# Patient Record
Sex: MALE | Race: WHITE | NOT HISPANIC OR LATINO | Employment: OTHER | URBAN - NONMETROPOLITAN AREA
[De-identification: names, ages, dates, MRNs, and addresses within clinical notes are randomized per-mention and may not be internally consistent; named-entity substitution may affect disease eponyms.]

---

## 2018-05-10 PROCEDURE — 96365 THER/PROPH/DIAG IV INF INIT: CPT

## 2018-05-10 PROCEDURE — 99283 EMERGENCY DEPT VISIT LOW MDM: CPT

## 2018-05-10 PROCEDURE — 96368 THER/DIAG CONCURRENT INF: CPT

## 2018-05-11 ENCOUNTER — HOSPITAL ENCOUNTER (EMERGENCY)
Facility: HOSPITAL | Age: 58
Discharge: ANOTHER HOSPITAL | End: 2018-05-11
Attending: EMERGENCY MEDICINE | Admitting: EMERGENCY MEDICINE

## 2018-05-11 ENCOUNTER — APPOINTMENT (OUTPATIENT)
Dept: ULTRASOUND IMAGING | Facility: HOSPITAL | Age: 58
End: 2018-05-11

## 2018-05-11 ENCOUNTER — HOSPITAL ENCOUNTER (INPATIENT)
Facility: HOSPITAL | Age: 58
LOS: 7 days | Discharge: NURSING, REHAB OR LONG-TERM CARE | End: 2018-05-18
Attending: INTERNAL MEDICINE | Admitting: THORACIC SURGERY (CARDIOTHORACIC VASCULAR SURGERY)

## 2018-05-11 ENCOUNTER — APPOINTMENT (OUTPATIENT)
Dept: GENERAL RADIOLOGY | Facility: HOSPITAL | Age: 58
End: 2018-05-11

## 2018-05-11 VITALS
RESPIRATION RATE: 18 BRPM | HEART RATE: 82 BPM | OXYGEN SATURATION: 99 % | BODY MASS INDEX: 31.41 KG/M2 | HEIGHT: 64 IN | WEIGHT: 184 LBS | DIASTOLIC BLOOD PRESSURE: 106 MMHG | SYSTOLIC BLOOD PRESSURE: 163 MMHG | TEMPERATURE: 97.8 F

## 2018-05-11 DIAGNOSIS — Z74.09 IMPAIRED FUNCTIONAL MOBILITY, BALANCE, GAIT, AND ENDURANCE: ICD-10-CM

## 2018-05-11 DIAGNOSIS — E08.621 DIABETIC ULCER OF TOE OF LEFT FOOT ASSOCIATED WITH DIABETES MELLITUS DUE TO UNDERLYING CONDITION, WITH BONE INVOLVEMENT WITHOUT EVIDENCE OF NECROSIS (HCC): ICD-10-CM

## 2018-05-11 DIAGNOSIS — L97.526 DIABETIC ULCER OF TOE OF LEFT FOOT ASSOCIATED WITH DIABETES MELLITUS DUE TO UNDERLYING CONDITION, WITH BONE INVOLVEMENT WITHOUT EVIDENCE OF NECROSIS (HCC): ICD-10-CM

## 2018-05-11 DIAGNOSIS — L02.612 CELLULITIS AND ABSCESS OF TOE OF LEFT FOOT: Primary | ICD-10-CM

## 2018-05-11 DIAGNOSIS — I73.9 PAD (PERIPHERAL ARTERY DISEASE) (HCC): ICD-10-CM

## 2018-05-11 DIAGNOSIS — L03.032 CELLULITIS AND ABSCESS OF TOE OF LEFT FOOT: Primary | ICD-10-CM

## 2018-05-11 DIAGNOSIS — I96 GANGRENE OF LEFT FOOT (HCC): Primary | ICD-10-CM

## 2018-05-11 PROBLEM — E11.9 DIABETES MELLITUS, TYPE 2 (HCC): Status: ACTIVE | Noted: 2018-05-11

## 2018-05-11 PROBLEM — N28.9 RENAL INSUFFICIENCY: Status: ACTIVE | Noted: 2018-05-11

## 2018-05-11 PROBLEM — L03.116 CELLULITIS OF LEFT LOWER EXTREMITY: Status: ACTIVE | Noted: 2018-05-11

## 2018-05-11 PROBLEM — L03.90 CELLULITIS: Status: ACTIVE | Noted: 2018-05-11

## 2018-05-11 PROBLEM — Z72.0 TOBACCO ABUSE: Status: ACTIVE | Noted: 2018-05-11

## 2018-05-11 LAB
ALBUMIN SERPL-MCNC: 4.6 G/DL (ref 3.5–5)
ALBUMIN/GLOB SERPL: 1.3 G/DL (ref 1.5–2.5)
ALP SERPL-CCNC: 144 U/L (ref 40–129)
ALT SERPL W P-5'-P-CCNC: 83 U/L (ref 10–44)
ANION GAP SERPL CALCULATED.3IONS-SCNC: 9.7 MMOL/L (ref 3.6–11.2)
AST SERPL-CCNC: 68 U/L (ref 10–34)
BASOPHILS # BLD AUTO: 0.03 10*3/MM3 (ref 0–0.3)
BASOPHILS NFR BLD AUTO: 0.3 % (ref 0–2)
BILIRUB SERPL-MCNC: 0.3 MG/DL (ref 0.2–1.8)
BNP SERPL-MCNC: 5 PG/ML (ref 0–100)
BUN BLD-MCNC: 34 MG/DL (ref 7–21)
BUN/CREAT SERPL: 27.6 (ref 7–25)
CALCIUM SPEC-SCNC: 10.8 MG/DL (ref 7.7–10)
CHLORIDE SERPL-SCNC: 108 MMOL/L (ref 99–112)
CO2 SERPL-SCNC: 20.3 MMOL/L (ref 24.3–31.9)
CREAT BLD-MCNC: 1.23 MG/DL (ref 0.43–1.29)
CRP SERPL-MCNC: 17.26 MG/DL (ref 0–0.99)
D-LACTATE SERPL-SCNC: 1.1 MMOL/L (ref 0.5–2)
D-LACTATE SERPL-SCNC: 2.1 MMOL/L (ref 0.5–2)
DEPRECATED RDW RBC AUTO: 52.5 FL (ref 37–54)
EOSINOPHIL # BLD AUTO: 0.32 10*3/MM3 (ref 0–0.7)
EOSINOPHIL NFR BLD AUTO: 3.2 % (ref 0–5)
ERYTHROCYTE [DISTWIDTH] IN BLOOD BY AUTOMATED COUNT: 16.3 % (ref 11.5–14.5)
ERYTHROCYTE [SEDIMENTATION RATE] IN BLOOD: 36 MM/HR (ref 0–20)
GFR SERPL CREATININE-BSD FRML MDRD: 60 ML/MIN/1.73
GLOBULIN UR ELPH-MCNC: 3.6 GM/DL
GLUCOSE BLD-MCNC: 199 MG/DL (ref 70–110)
GLUCOSE BLDC GLUCOMTR-MCNC: 112 MG/DL (ref 70–130)
GLUCOSE BLDC GLUCOMTR-MCNC: 135 MG/DL (ref 70–130)
GLUCOSE BLDC GLUCOMTR-MCNC: 137 MG/DL (ref 70–130)
HCT VFR BLD AUTO: 34.6 % (ref 42–52)
HGB BLD-MCNC: 11.1 G/DL (ref 14–18)
HOLD SPECIMEN: NORMAL
IMM GRANULOCYTES # BLD: 0.08 10*3/MM3 (ref 0–0.03)
IMM GRANULOCYTES NFR BLD: 0.8 % (ref 0–0.5)
LYMPHOCYTES # BLD AUTO: 1.92 10*3/MM3 (ref 1–3)
LYMPHOCYTES NFR BLD AUTO: 19.5 % (ref 21–51)
MCH RBC QN AUTO: 28.6 PG (ref 27–33)
MCHC RBC AUTO-ENTMCNC: 32.1 G/DL (ref 33–37)
MCV RBC AUTO: 89.2 FL (ref 80–94)
MONOCYTES # BLD AUTO: 0.59 10*3/MM3 (ref 0.1–0.9)
MONOCYTES NFR BLD AUTO: 6 % (ref 0–10)
NEUTROPHILS # BLD AUTO: 6.91 10*3/MM3 (ref 1.4–6.5)
NEUTROPHILS NFR BLD AUTO: 70.2 % (ref 30–70)
OSMOLALITY SERPL CALC.SUM OF ELEC: 288.9 MOSM/KG (ref 273–305)
PLATELET # BLD AUTO: 275 10*3/MM3 (ref 130–400)
PMV BLD AUTO: 11.6 FL (ref 6–10)
POTASSIUM BLD-SCNC: 3.6 MMOL/L (ref 3.5–5.3)
PROT SERPL-MCNC: 8.2 G/DL (ref 6–8)
RBC # BLD AUTO: 3.88 10*6/MM3 (ref 4.7–6.1)
SODIUM BLD-SCNC: 138 MMOL/L (ref 135–153)
WBC NRBC COR # BLD: 9.85 10*3/MM3 (ref 4.5–12.5)
WHOLE BLOOD HOLD SPECIMEN: NORMAL
WHOLE BLOOD HOLD SPECIMEN: NORMAL

## 2018-05-11 PROCEDURE — 87205 SMEAR GRAM STAIN: CPT | Performed by: INTERNAL MEDICINE

## 2018-05-11 PROCEDURE — 93926 LOWER EXTREMITY STUDY: CPT | Performed by: RADIOLOGY

## 2018-05-11 PROCEDURE — 93926 LOWER EXTREMITY STUDY: CPT

## 2018-05-11 PROCEDURE — 96368 THER/DIAG CONCURRENT INF: CPT

## 2018-05-11 PROCEDURE — 73660 X-RAY EXAM OF TOE(S): CPT

## 2018-05-11 PROCEDURE — 99222 1ST HOSP IP/OBS MODERATE 55: CPT | Performed by: THORACIC SURGERY (CARDIOTHORACIC VASCULAR SURGERY)

## 2018-05-11 PROCEDURE — 25010000002 PIPERACILLIN SOD-TAZOBACTAM PER 1 G: Performed by: INTERNAL MEDICINE

## 2018-05-11 PROCEDURE — 96365 THER/PROPH/DIAG IV INF INIT: CPT

## 2018-05-11 PROCEDURE — 25010000002 HEPARIN (PORCINE) PER 1000 UNITS: Performed by: INTERNAL MEDICINE

## 2018-05-11 PROCEDURE — 82962 GLUCOSE BLOOD TEST: CPT

## 2018-05-11 PROCEDURE — 80053 COMPREHEN METABOLIC PANEL: CPT | Performed by: PHYSICIAN ASSISTANT

## 2018-05-11 PROCEDURE — 83605 ASSAY OF LACTIC ACID: CPT | Performed by: EMERGENCY MEDICINE

## 2018-05-11 PROCEDURE — 25010000002 VANCOMYCIN PER 500 MG: Performed by: PHYSICIAN ASSISTANT

## 2018-05-11 PROCEDURE — 99223 1ST HOSP IP/OBS HIGH 75: CPT | Performed by: INTERNAL MEDICINE

## 2018-05-11 PROCEDURE — 93971 EXTREMITY STUDY: CPT

## 2018-05-11 PROCEDURE — 85652 RBC SED RATE AUTOMATED: CPT | Performed by: EMERGENCY MEDICINE

## 2018-05-11 PROCEDURE — 87040 BLOOD CULTURE FOR BACTERIA: CPT | Performed by: EMERGENCY MEDICINE

## 2018-05-11 PROCEDURE — 63710000001 INSULIN LISPRO (HUMAN) PER 5 UNITS: Performed by: INTERNAL MEDICINE

## 2018-05-11 PROCEDURE — 93971 EXTREMITY STUDY: CPT | Performed by: RADIOLOGY

## 2018-05-11 PROCEDURE — 86140 C-REACTIVE PROTEIN: CPT | Performed by: EMERGENCY MEDICINE

## 2018-05-11 PROCEDURE — 25010000002 PIPERACILLIN-TAZOBACTAM: Performed by: PHYSICIAN ASSISTANT

## 2018-05-11 PROCEDURE — 83880 ASSAY OF NATRIURETIC PEPTIDE: CPT | Performed by: EMERGENCY MEDICINE

## 2018-05-11 PROCEDURE — 85025 COMPLETE CBC W/AUTO DIFF WBC: CPT | Performed by: PHYSICIAN ASSISTANT

## 2018-05-11 PROCEDURE — 87070 CULTURE OTHR SPECIMN AEROBIC: CPT | Performed by: INTERNAL MEDICINE

## 2018-05-11 RX ORDER — LISINOPRIL 40 MG/1
40 TABLET ORAL DAILY
COMMUNITY

## 2018-05-11 RX ORDER — AMLODIPINE BESYLATE 10 MG/1
10 TABLET ORAL DAILY
COMMUNITY

## 2018-05-11 RX ORDER — CLONIDINE HYDROCHLORIDE 0.1 MG/1
0.1 TABLET ORAL 2 TIMES DAILY
COMMUNITY

## 2018-05-11 RX ORDER — LISINOPRIL 20 MG/1
40 TABLET ORAL DAILY
Status: DISCONTINUED | OUTPATIENT
Start: 2018-05-11 | End: 2018-05-18 | Stop reason: HOSPADM

## 2018-05-11 RX ORDER — OXYCODONE HYDROCHLORIDE AND ACETAMINOPHEN 5; 325 MG/1; MG/1
1 TABLET ORAL EVERY 4 HOURS PRN
Status: DISCONTINUED | OUTPATIENT
Start: 2018-05-11 | End: 2018-05-18 | Stop reason: HOSPADM

## 2018-05-11 RX ORDER — FENOFIBRATE 145 MG/1
145 TABLET, COATED ORAL DAILY
COMMUNITY

## 2018-05-11 RX ORDER — AMLODIPINE BESYLATE 10 MG/1
10 TABLET ORAL DAILY
Status: DISCONTINUED | OUTPATIENT
Start: 2018-05-11 | End: 2018-05-18 | Stop reason: HOSPADM

## 2018-05-11 RX ORDER — SODIUM CHLORIDE 0.9 % (FLUSH) 0.9 %
10 SYRINGE (ML) INJECTION AS NEEDED
Status: DISCONTINUED | OUTPATIENT
Start: 2018-05-11 | End: 2018-05-11 | Stop reason: HOSPADM

## 2018-05-11 RX ORDER — ATORVASTATIN CALCIUM 40 MG/1
80 TABLET, FILM COATED ORAL NIGHTLY
Status: DISCONTINUED | OUTPATIENT
Start: 2018-05-11 | End: 2018-05-18 | Stop reason: HOSPADM

## 2018-05-11 RX ORDER — ROPINIROLE 0.5 MG/1
1 TABLET, FILM COATED ORAL NIGHTLY
Status: DISCONTINUED | OUTPATIENT
Start: 2018-05-11 | End: 2018-05-18 | Stop reason: HOSPADM

## 2018-05-11 RX ORDER — DOXYCYCLINE 100 MG/1
100 CAPSULE ORAL EVERY 12 HOURS SCHEDULED
Status: COMPLETED | OUTPATIENT
Start: 2018-05-11 | End: 2018-05-18

## 2018-05-11 RX ORDER — CARVEDILOL 12.5 MG/1
25 TABLET ORAL 2 TIMES DAILY WITH MEALS
Status: DISCONTINUED | OUTPATIENT
Start: 2018-05-11 | End: 2018-05-18 | Stop reason: HOSPADM

## 2018-05-11 RX ORDER — DOXYCYCLINE HYCLATE 100 MG
100 TABLET ORAL EVERY 12 HOURS SCHEDULED
Status: DISCONTINUED | OUTPATIENT
Start: 2018-05-11 | End: 2018-05-11 | Stop reason: ALTCHOICE

## 2018-05-11 RX ORDER — ASPIRIN 81 MG/1
324 TABLET, CHEWABLE ORAL DAILY
Status: DISCONTINUED | OUTPATIENT
Start: 2018-05-11 | End: 2018-05-18 | Stop reason: HOSPADM

## 2018-05-11 RX ORDER — CLONIDINE HYDROCHLORIDE 0.1 MG/1
0.1 TABLET ORAL EVERY 12 HOURS SCHEDULED
Status: DISCONTINUED | OUTPATIENT
Start: 2018-05-11 | End: 2018-05-18 | Stop reason: HOSPADM

## 2018-05-11 RX ORDER — IBUPROFEN 800 MG/1
800 TABLET ORAL EVERY 6 HOURS PRN
COMMUNITY
End: 2018-05-18 | Stop reason: HOSPADM

## 2018-05-11 RX ORDER — NICOTINE 21 MG/24HR
1 PATCH, TRANSDERMAL 24 HOURS TRANSDERMAL DAILY
Status: DISCONTINUED | OUTPATIENT
Start: 2018-05-11 | End: 2018-05-18 | Stop reason: HOSPADM

## 2018-05-11 RX ORDER — ATORVASTATIN CALCIUM 20 MG/1
20 TABLET, FILM COATED ORAL DAILY
COMMUNITY
End: 2018-05-18 | Stop reason: HOSPADM

## 2018-05-11 RX ORDER — DEXTROSE MONOHYDRATE 25 G/50ML
25 INJECTION, SOLUTION INTRAVENOUS
Status: DISCONTINUED | OUTPATIENT
Start: 2018-05-11 | End: 2018-05-18 | Stop reason: HOSPADM

## 2018-05-11 RX ORDER — NICOTINE POLACRILEX 4 MG
15 LOZENGE BUCCAL
Status: DISCONTINUED | OUTPATIENT
Start: 2018-05-11 | End: 2018-05-18 | Stop reason: HOSPADM

## 2018-05-11 RX ORDER — HEPARIN SODIUM 5000 [USP'U]/ML
5000 INJECTION, SOLUTION INTRAVENOUS; SUBCUTANEOUS EVERY 8 HOURS SCHEDULED
Status: DISCONTINUED | OUTPATIENT
Start: 2018-05-11 | End: 2018-05-14

## 2018-05-11 RX ORDER — NITROGLYCERIN 0.4 MG/1
0.4 TABLET SUBLINGUAL
COMMUNITY

## 2018-05-11 RX ORDER — ALLOPURINOL 300 MG/1
300 TABLET ORAL DAILY
Status: DISCONTINUED | OUTPATIENT
Start: 2018-05-11 | End: 2018-05-18 | Stop reason: HOSPADM

## 2018-05-11 RX ORDER — SODIUM CHLORIDE 0.9 % (FLUSH) 0.9 %
1-10 SYRINGE (ML) INJECTION AS NEEDED
Status: DISCONTINUED | OUTPATIENT
Start: 2018-05-11 | End: 2018-05-18 | Stop reason: HOSPADM

## 2018-05-11 RX ORDER — ROPINIROLE 1 MG/1
1 TABLET, FILM COATED ORAL NIGHTLY
COMMUNITY

## 2018-05-11 RX ORDER — CARVEDILOL 25 MG/1
25 TABLET ORAL 2 TIMES DAILY WITH MEALS
COMMUNITY

## 2018-05-11 RX ORDER — ALLOPURINOL 300 MG/1
300 TABLET ORAL DAILY
COMMUNITY

## 2018-05-11 RX ADMIN — HEPARIN SODIUM 5000 UNITS: 5000 INJECTION, SOLUTION INTRAVENOUS; SUBCUTANEOUS at 14:33

## 2018-05-11 RX ADMIN — LISINOPRIL 40 MG: 20 TABLET ORAL at 11:11

## 2018-05-11 RX ADMIN — ATORVASTATIN CALCIUM 80 MG: 40 TABLET, FILM COATED ORAL at 21:41

## 2018-05-11 RX ADMIN — OXYCODONE HYDROCHLORIDE AND ACETAMINOPHEN 1 TABLET: 5; 325 TABLET ORAL at 14:12

## 2018-05-11 RX ADMIN — TAZOBACTAM SODIUM AND PIPERACILLIN SODIUM 4.5 G: .5; 4 INJECTION, POWDER, LYOPHILIZED, FOR SOLUTION INTRAVENOUS at 02:47

## 2018-05-11 RX ADMIN — TAZOBACTAM SODIUM AND PIPERACILLIN SODIUM 4.5 G: 500; 4 INJECTION, SOLUTION INTRAVENOUS at 17:49

## 2018-05-11 RX ADMIN — DOXYCYCLINE 100 MG: 100 CAPSULE ORAL at 17:49

## 2018-05-11 RX ADMIN — HEPARIN SODIUM 5000 UNITS: 5000 INJECTION, SOLUTION INTRAVENOUS; SUBCUTANEOUS at 21:41

## 2018-05-11 RX ADMIN — OXYCODONE HYDROCHLORIDE AND ACETAMINOPHEN 1 TABLET: 5; 325 TABLET ORAL at 21:45

## 2018-05-11 RX ADMIN — CARVEDILOL 25 MG: 12.5 TABLET, FILM COATED ORAL at 11:12

## 2018-05-11 RX ADMIN — ROPINIROLE 1 MG: 0.5 TABLET, FILM COATED ORAL at 21:41

## 2018-05-11 RX ADMIN — SODIUM CHLORIDE 1000 ML: 9 INJECTION, SOLUTION INTRAVENOUS at 02:47

## 2018-05-11 RX ADMIN — AMLODIPINE BESYLATE 10 MG: 10 TABLET ORAL at 11:12

## 2018-05-11 RX ADMIN — TAZOBACTAM SODIUM AND PIPERACILLIN SODIUM 4.5 G: 500; 4 INJECTION, SOLUTION INTRAVENOUS at 12:24

## 2018-05-11 RX ADMIN — CLONIDINE HYDROCHLORIDE 0.1 MG: 0.1 TABLET ORAL at 21:41

## 2018-05-11 RX ADMIN — ALLOPURINOL 300 MG: 300 TABLET ORAL at 11:12

## 2018-05-11 RX ADMIN — VANCOMYCIN HYDROCHLORIDE 1750 MG: 5 INJECTION, POWDER, LYOPHILIZED, FOR SOLUTION INTRAVENOUS at 03:31

## 2018-05-11 RX ADMIN — CLONIDINE HYDROCHLORIDE 0.1 MG: 0.1 TABLET ORAL at 11:11

## 2018-05-11 RX ADMIN — ASPIRIN 81 MG 324 MG: 81 TABLET ORAL at 11:11

## 2018-05-11 RX ADMIN — CARVEDILOL 25 MG: 12.5 TABLET, FILM COATED ORAL at 17:49

## 2018-05-11 NOTE — CONSULTS
Sandoval Hernandez  1960  [id number]  5/11/2018      Referring Provider: Marcel Fagan M.D. [facility].  Reason for Consultation: Evolving wet gangrene of left foot.      Subjective   History of present illness:  This is an unfortunate 58-year-old gentleman from [facility].  He is a poor historian but believes that he was diagnosed with diabetes mellitus more than a decade ago.  He has been on metformin over the last number of years without any supplemental insulin.  He is known to have peripheral arterial disease with previous amputations of the left hallux and fourth digit.  2 weeks ago, he developed rest pain and apparently underwent angiography and stent deployment to the left lower extremity at [facility].  This was followed by discoloration of the second, third, and fifth digits of the left foot with a large necrotic eschar extending over the fifth ray, proximally and laterally.  The patient has experienced subjective fever and chilling.  He has had bulla formation of the dorsal foot which has spontaneously ruptured with release of foul-smelling serous material.  He was subsequently transferred to [place] for a higher level of care.  Vancomycin and piperacillin tazobactam have been initiated.  A C-reactive protein is markedly elevated at 17.3.  Serum lactate level is 1.1.  Peripheral leukocyte count is 9800.  Of note, arterial duplex studies of the left lower extremity reveal largely patent vessels.  A duplex venogram is negative for deep vein thrombosis.  Blood cultures ×2 are negative at less than 24 hours.  The patient has no recollection of his last tetanus immunization.  Asked to assist with antimicrobial therapy and diagnostic evaluation in this context.    Past Medical History:   Diagnosis Date   • Diabetes mellitus    • Gout    • Hypertension        Past Surgical History:   Procedure Laterality Date   • AMPUTATION FOOT / TOE      amputation of toes to left foot        Pediatric History   Patient Guardian Status   • Not on file.     Other Topics Concern   • Not on file     Social History Narrative   • No narrative on file       family history is not on file.    No Known Allergies    Medication:MAR reviewed.  Antibiotics: See below.  Anti-Infectives     Ordered     Dose/Rate Route Frequency Start Stop    05/11/18 1048  vancomycin 1250 mg/250 mL 0.9% NS IVPB ([facility])     Ordering Provider:  Arlene Laboy RPH    1,250 mg  over 1.25 Hours Intravenous Every 24 Hours 05/11/18 2300 05/18/18 2259    05/11/18 1026  piperacillin-tazobactam (ZOSYN) 4.5 g in iso-osmotic dextrose 100 mL IVPB (premix)     Ordering Provider:  Marcel Fagan MD    4.5 g  over 4 Hours Intravenous Every 8 Hours 05/11/18 1700 05/18/18 1659    05/11/18 1026  piperacillin-tazobactam (ZOSYN) 4.5 g in iso-osmotic dextrose 100 mL IVPB (premix)     Ordering Provider:  Marcel Fagan MD    4.5 g  over 30 Minutes Intravenous Once 05/11/18 1100 05/11/18 1254    05/11/18 1026  Pharmacy to dose vancomycin     Ordering Provider:  Marcel Fagan MD     Does not apply Continuous PRN 05/11/18 1025 05/18/18 1024          Please refer to the medical record for a full medication list    Review of Systems  Pertinent items are noted in HPI, all other systems reviewed and negative    Objective     Physical Exam: See below.  Vital Signs   Temp:  [97.8 °F (36.6 °C)-98.9 °F (37.2 °C)] 98.4 °F (36.9 °C)  Heart Rate:  [82-92] 82  Resp:  [18] 18  BP: (130-164)/() 164/83    Blood pressure 164/83, pulse 82, temperature 98.4 °F (36.9 °C), temperature source Oral, resp. rate 18, SpO2 98 %.  GENERAL: Awake and alert, in no acute distress.  Disheveled gentleman.  No acute distress.  Poor historian.  HEENT: Oropharynx without thrush. Sinuses nontender.  Severe periodontal disease with 6 remaining carious teeth.. No cervical adenopathy. No carotid bruits/ jugular venous distention.   EYES: PERRL. No conjunctival injection. No icterus.  EOMI.  LYMPHATICS: No lymphadenopathy of the neck or axillary or inguinal regions.   HEART: No murmur, gallop, or pericardial friction rub.   LUNGS: Clear to auscultation anteriorly. No percussion dullness.   ABDOMEN: Soft, nontender, nondistended. No appreciable HSM.    SKIN: Warm and dry without cutaneous eruptions. No embolic stigmata.  Wet gangrene in evolution noted on the left foot.  Violaceous changes with bulla formation overlying the second third and fifth digits.  There is a large necrotic eschar over the dorsal/lateral aspect of the left foot.  I am unable to palpate crepitant gas in the soft tissues.  The dorsalis pedis and posterior tibialis pulses are nonpalpable.  PSYCHIATRIC: Mental status lucid. Cranial nerve function intact.       Results Review:   I reviewed the patient's new clinical results.  I reviewed the patient's new imaging results and agree with the interpretation.    Lab Results   Component Value Date    WBC 9.85 05/11/2018    HGB 11.1 (L) 05/11/2018    HCT 34.6 (L) 05/11/2018    MCV 89.2 05/11/2018     05/11/2018       Lab Results   Component Value Date    GLUCOSE 199 (H) 05/11/2018    BUN 34 (H) 05/11/2018    CREATININE 1.23 05/11/2018    EGFRIFNONA 60 (L) 05/11/2018    BCR 27.6 (H) 05/11/2018    CO2 20.3 (L) 05/11/2018    CALCIUM 10.8 (H) 05/11/2018    ALBUMIN 4.60 05/11/2018    LABIL2 1.3 (L) 05/11/2018    AST 68 (H) 05/11/2018    ALT 83 (H) 05/11/2018       Estimated Creatinine Clearance: 63.8 mL/min (by C-G formula based on SCr of 1.23 mg/dL).      Microbiology:Blood cultures ×2 incubating.  Wound culture pending.      Radiology:  Imaging Results (last 72 hours)     ** No results found for the last 72 hours. **          ASSESSMENT AND PLAN: Evolving wet gangrene of the left lower extremity.  Type 2 diabetes mellitus.  Severe peripheral arterial disease.  Recent stent deployment in left lower extremity for rest pain at [facility]/presumed atheroembolic event.   Markedly elevated C-reactive protein/probable underlying osteomyelitis.  Chronic kidney disease stage II.  Hypercalcemia/serum calcium 10.8/albumin 4.6.  Elevated liver function studies/ likely related to sepsis or statin therapy.  Ongoing tobacco abuse.  Inadequate tetanus immunization.  Maximum temperature over 24 hours 98.6°.  Currently 98.4.  Hemodynamically stable.  Arterial duplex and venous studies reviewed.  No plain films to look for crepitant gas in the soft tissues or gross digital osteolytic changes.  Agree with piperacillin-tazobactam for broad staph aureus, enteric gram-negative rods including Pseudomonas, and anaerobic activity.  We'll discontinue vancomycin given diabetes mellitus and renal dysfunction.  Add oral doxycycline for empiric MRSA coverage.  Examined at the bedside with Dr. Carreno.  Anticipate below-knee amputation on Monday unless pushed by hemodynamic instability/systemic sepsis.       I discussed the patients findings and my recommendations with patient and consulting provider    Thank you for this consult.  Our group would be pleased to follow this patient over the course of their hospitalization and assist with outpatient antimicrobial therapy, as indicated.     Dc Coates MD  5/11/2018

## 2018-05-11 NOTE — PLAN OF CARE
Problem: Fall Risk (Adult)  Goal: Identify Related Risk Factors and Signs and Symptoms  Outcome: Ongoing (interventions implemented as appropriate)    Goal: Absence of Fall  Outcome: Ongoing (interventions implemented as appropriate)      Problem: Patient Care Overview  Goal: Plan of Care Review  Outcome: Ongoing (interventions implemented as appropriate)      Problem: Infection, Risk/Actual (Adult)  Goal: Identify Related Risk Factors and Signs and Symptoms  Outcome: Ongoing (interventions implemented as appropriate)    Goal: Infection Prevention/Resolution  Outcome: Ongoing (interventions implemented as appropriate)

## 2018-05-11 NOTE — NURSING NOTE
ACC REVIEW REPORT: [facility]        PATIENT NAME: Sandoval Hernandez    PATIENT ID: [id number]    BED: S337    BED TYPE: TELEMETRY    BED GIVEN TO:BHARGAVI ROWAN RN    TIME BED GIVEN: 0623    YOB: 1960    AGE: 58    GENDER: MALE    PREVIOUS ADMIT TO [facility]: NO    PREVIOUS ADMISSION DATE: N/A    PATIENT CLASS: INPATIENT    TODAY'S DATE: 5/11/2018    TRANSFER DATE: 5/11/18    ETA: 0800    TRANSFERRING FACILITY: [facility]    TRANSFERRING FACILITY PHONE # : [phone]    TRANSFERRING MD: PEYTON    DATE/TIME REQUEST RECEIVED: 5/11/18 0515    [facility] RN: JACKIE VUONG RN    REPORT FROM: BHARGAVI ROWAN RN    TIME REPORT TAKEN: 0615    DIAGNOSIS: LEFT FOOT CELLULITIS    REASON FOR TRANSFER TO [facility]: HIGHER LEVEL OF CARE    TRANSPORTATION: AMBULANCE    CLINICAL REASON FOR TRANSFER TO [facility]: PT CAME TO ER WITH C/O LEFT FOOT SWELLING PAIN AND FEVER.  REMAINING TOES ARE NECROTIC      CLINICAL INFORMATION    HEIGHT: 64 INCHES    WEIGHT: 184 LBS    ALLERGIES: NKDA    ELIAS: NO    INFECTIOUS DISEASE: NO    ISOLATION: NO    LAST VITAL SIGNS:  TIME:0620  TEMP: AFEB  PULSE: 92  B/P: 130/64  RESP: 20    LAB INFORMATION:  WBC 4.50 - 12.50 10*3/mm3 9.85    RBC 4.70 - 6.10 10*6/mm3 3.88     Hemoglobin 14.0 - 18.0 g/dL 11.1     Hematocrit 42.0 - 52.0 % 34.6     MCV 80.0 - 94.0 fL 89.2    MCH 27.0 - 33.0 pg 28.6    MCHC 33.0 - 37.0 g/dL 32.1     RDW 11.5 - 14.5 % 16.3     RDW-SD 37.0 - 54.0 fl 52.5    MPV 6.0 - 10.0 fL 11.6     Platelets 130 - 400 10*3/mm3 275    Neutrophil % 30.0 - 70.0 % 70.2     Lymphocyte % 21.0 - 51.0 % 19.5     Monocyte % 0.0 - 10.0 % 6.0    Eosinophil % 0.0 - 5.0 % 3.2    Basophil % 0.0 - 2.0 % 0.3    Immature Grans % 0.0 - 0.5 % 0.8     Neutrophils, Absolute 1.40 - 6.50 10*3/mm3 6.91     Lymphocytes, Absolute 1.00 - 3.00 10*3/mm3 1.92    Monocytes, Absolute 0.10 - 0.90 10*3/mm3 0.59    Eosinophils, Absolute 0.00 - 0.70 10*3/mm3 0.32    Basophils, Absolute 0.00 - 0.30 10*3/mm3 0.03    Immature  Grans, Absolute 0.00 - 0.03 10*3/mm3 0.08       C-Reactive Protein 0.00 - 0.99 mg/dL 17.26       Sed Rate 0 - 20 mm/hr 36       Glucose 70 - 110 mg/dL 199     BUN 7 - 21 mg/dL 34     Creatinine 0.43 - 1.29 mg/dL 1.23    Sodium 135 - 153 mmol/L 138    Potassium 3.5 - 5.3 mmol/L 3.6    Chloride 99 - 112 mmol/L 108    CO2 24.3 - 31.9 mmol/L 20.3     Calcium 7.7 - 10.0 mg/dL 10.8     Total Protein 6.0 - 8.0 g/dL 8.2     Albumin 3.50 - 5.00 g/dL 4.60    ALT (SGPT) 10 - 44 U/L 83     AST (SGOT) 10 - 34 U/L 68     Alkaline Phosphatase 40 - 129 U/L 144     Comments: Note New Reference Ranges   Total Bilirubin 0.2 - 1.8 mg/dL 0.3    eGFR Non African Amer >60 mL/min/1.73 60     Globulin gm/dL 3.6    A/G Ratio 1.5 - 2.5 g/dL 1.3     BUN/Creatinine Ratio 7.0 - 25.0 27.6     Anion Gap 3.6 - 11.2 mmol/L 9.7        CULTURE INFORMATION: PENDING    MEDS/IV FLUIDS: 20 LAC 4.5 GM ZOSYN, 1000 ML NS BOLUS, 1750 MG VANCOMYCIN      CARDIAC SYSTEM:    CHEST PAIN: NO    RATE: 0    SCALE: 1/10    RHYTHM: NSR    Is patient taking or has patient been given any drugs that could increase bleeding? NO  (Plavix, Brilinta, Effient, Eliquis, Xarelto, Warfarin, Integrilin, Angiomax)    DRUG: N/A     DOSE/FREQUENCY: N/A    RESPIRATORY SYSTEM:    LUNG SOUNDS:    CLEAR: Y  CRACKLES: N  WHEEZES: N  RHONCHI: N  DIMINISHED: N    OXYGEN: NO    O2 SAT: 97     ADMINISTRATION ROUTE: R/A    CNS/MUSCULOSKELETAL    ALERT AND ORIENTED:    PERSON: Y  PLACE: Y  TIME: Y    INJURY:  WHERE: Left foot edema and erythema worsened at 1st toe stub as it has been amputated; 4th toe also amputated; 2nd, 3rd and 5th toes discolored.      DAVID COMA SCALE:    E:4  M: 6  V: 5    GI//GY      ABDOMINAL PAIN: N    VOMITING: N    DIARRHEA: N    NAUSEA: N    BOWEL SOUNDS: ACTIVE    OCCULT STOOL: NO    VAGINAL BLEEDING: NO    TESTICULAR PAIN: N/A    HEMATURIA: NO    PAST MEDICAL HISTORY:    Diabetes mellitus     • Gout     • Hypertension       AMPUTATION FOOT / TOE          amputation of toes to left foot     Alondra Anna RN  5/11/2018  6:05 AM

## 2018-05-11 NOTE — H&P
"    [facility] Services  HISTORY AND PHYSICAL    Patient Name: Sandoval Hernandez  : 1960  MRN: [id number]  Primary Care Physician: ALETA Plummer    Subjective   Subjective     Chief Complaint:  Foot pain    HPI:  Sandoval Hernandez is a 58 y.o. male with a history of coronary artery disease, peripheral arterial disease, type 2 diabetes on oral hypoglycemics, and ongoing tobacco abuse who presented to [facility] with complaints of left foot pain.  He has had increasing pain and duskiness to his left midfoot into his toes.  Previous first and fourth toe amputations.  He has had some serous drainage but no purulence.  Associated with redness and warmth.  Apparently 2 weeks ago at [facility] he had stenting done in his left leg but cannot give me full details.  He said the first stent \"collapsed\" and then a second stent was placed.  After that he said he had 6 stents placed across vessels in his abdomen, it is unclear what he could be referring to.  It is also unclear if his taking any antiplatelets.  In the emergency room he was found to have evidence of necrotic toes and areas on his foot.  He has transferred to [facility] for more definitive management.  The patient is understanding he is likely to need amputation and is willing to proceed if needed.    Review of Systems   Constitutional: Negative for fever.   Respiratory: Positive for shortness of breath.    Cardiovascular: Negative for chest pain.   Gastrointestinal: Negative for abdominal pain.   Musculoskeletal: Positive for arthralgias.   All other systems reviewed and are negative.         Otherwise 10-system ROS reviewed and is negative except as mentioned in the HPI.    Personal History     Past Medical History:   Diagnosis Date   • Diabetes mellitus    • Gout    • Hypertension        Past Surgical History:   Procedure Laterality Date   • AMPUTATION FOOT / TOE      amputation of toes to left foot "       Family History: Positive for diabetes.    Social History:  reports that he has been smoking.  He does not have any smokeless tobacco history on file.  Social History     Social History Narrative   • No narrative on file   Smokes half pack to a pack a day.  Denies any alcohol use.  Currently lives at home.    Medications:  Prescriptions Prior to Admission   Medication Sig Dispense Refill Last Dose   • allopurinol (ZYLOPRIM) 300 MG tablet Take 300 mg by mouth Daily.      • amLODIPine (NORVASC) 10 MG tablet Take 10 mg by mouth Daily.      • atorvastatin (LIPITOR) 20 MG tablet Take 20 mg by mouth Daily.      • carvedilol (COREG) 25 MG tablet Take 25 mg by mouth 2 (Two) Times a Day With Meals.      • CloNIDine (CATAPRES) 0.1 MG tablet Take 0.1 mg by mouth 2 (Two) Times a Day.      • fenofibrate (TRICOR) 145 MG tablet Take 145 mg by mouth Daily.      • ibuprofen (ADVIL,MOTRIN) 800 MG tablet Take 800 mg by mouth Every 6 (Six) Hours As Needed for Mild Pain .      • lisinopril (PRINIVIL,ZESTRIL) 40 MG tablet Take 40 mg by mouth Daily.      • metFORMIN (GLUCOPHAGE) 1000 MG tablet Take 1,000 mg by mouth 2 (Two) Times a Day With Meals.      • nitroglycerin (NITROSTAT) 0.4 MG SL tablet Place 0.4 mg under the tongue Every 5 (Five) Minutes As Needed for Chest Pain. Take no more than 3 doses in 15 minutes.      • rOPINIRole (REQUIP) 1 MG tablet Take 1 mg by mouth Every Night. Take 1 hour before bedtime.          No Known Allergies    Objective   Objective     Vital Signs:   Temp:  [97.8 °F (36.6 °C)-98.9 °F (37.2 °C)] 98.6 °F (37 °C)  Heart Rate:  [82-92] 91  Resp:  [18] 18  BP: (130-163)/() 143/76        Physical Exam   Constitutional: No acute distress, awake, alert, appears older than age  HENT: NCAT, mucous membranes moist, poor dentition  Respiratory: diminished but clear, respiratory effort normal   Cardiovascular: RRR, no murmurs, rubs, or gallops  Gastrointestinal: Positive bowel sounds, soft, nontender,  nondistended  Musculoskeletal: left leg with previous 1st, 4th amputations.  2/3rd toes with purple discoloration.  Redness to mid-foot.  Also area or necrosis to lateral side  Psychiatric: Appropriate affect, cooperative  Neurologic: Oriented x 3, strength symmetric in all extremities, Cranial Nerves grossly intact to confrontation, speech clear  Skin: No rashes    Results Reviewed:  I have personally reviewed current lab, radiology, and data and agree.      Results from last 7 days  Lab Units 05/11/18  0116   WBC 10*3/mm3 9.85   HEMOGLOBIN g/dL 11.1*   HEMATOCRIT % 34.6*   PLATELETS 10*3/mm3 275       Results from last 7 days  Lab Units 05/11/18  0116   SODIUM mmol/L 138   POTASSIUM mmol/L 3.6   CHLORIDE mmol/L 108   CO2 mmol/L 20.3*   BUN mg/dL 34*   CREATININE mg/dL 1.23   GLUCOSE mg/dL 199*   CALCIUM mg/dL 10.8*   ALT (SGPT) U/L 83*   AST (SGOT) U/L 68*     Estimated Creatinine Clearance: 63.8 mL/min (by C-G formula based on SCr of 1.23 mg/dL).  Brief Urine Lab Results     None        BNP   Date Value Ref Range Status   05/11/2018 5.0 0.0 - 100.0 pg/mL Final     No results found for: PHART  Imaging Results (last 24 hours)     ** No results found for the last 24 hours. **             Assessment/Plan   Assessment / Plan     Hospital Problem List     * (Principal)Gangrene of left foot    Cellulitis of left lower extremity    PAD (peripheral artery disease)    Diabetes mellitus, type 2    Tobacco abuse    Renal insufficiency            Assessment & Plan:  58-year-old male with history of peripheral arterial disease with previous left toe amputations presents with increasing pain and evidence of gangrene of his left foot and toes.  Apparently recently had some stenting done to the left lower extremity at [facility], we will try to obtain those records to determine degree of disease.  We'll start empiric antibiotics with vancomycin and Zosyn.  We'll have infectious disease help guide antibiotic therapy.   We'll consult Dr. Carreno to evaluate for possible amputation.  The patient understands and is agreeable to proceed if needed.  Place him on aspirin, increase his statin to 80 mg of Lipitor.  Replacement signed scale insulin and checking a when A1c.  We'll need good glucose control for wound healing.  Counseled on tobacco abuse (nicotine patch.  Creatinine is elevated to 1.2 with a GFR of 60 suspect this is chronic but will recheck in the morning.    DVT prophylaxis: heparin    CODE STATUS:  Full Code    Admission Status:  I believe this patient meets INPATIENT status due to the need for care which can only be reasonably provided in an hospital setting such as aggressive/expedited ancillary services and/or consultation services, the necessity for IV medications, close physician monitoring and/or the possible need for procedures.  In such, I feel patient’s risk for adverse outcomes and need for care warrant INPATIENT evaluation and predict the patient’s care encounter to likely last beyond 2 midnights.      Electronically signed by Marcel Fagan MD, 05/11/18, 10:28 AM.

## 2018-05-11 NOTE — ED NOTES
Called [facility] and spoke to Renu to donny hospitalist at [facility] for Stella.     Debi Genao  05/11/18 0422

## 2018-05-11 NOTE — PROGRESS NOTES
Pharmacy Consult-Vancomycin Dosing  Sandoval Hernandez is a  58 y.o. male receiving vancomycin therapy.     Indication: SSTI  Consulting Provider: Dr. Fagan  ID Consult: Yes    Goal Trough: 10-20mg/L    Current Antimicrobial Therapy  Anti-Infectives     Ordered     Dose/Rate Route Frequency Start Stop    05/11/18 1048  vancomycin 1250 mg/250 mL 0.9% NS IVPB ([facility])     Ordering Provider:  Arlene Laboy RPH    1,250 mg  over 1.25 Hours Intravenous Every 24 Hours 05/11/18 2300 05/18/18 2259    05/11/18 1026  piperacillin-tazobactam (ZOSYN) 4.5 g in iso-osmotic dextrose 100 mL IVPB (premix)     Ordering Provider:  Marcel Fagan MD    4.5 g  over 4 Hours Intravenous Every 8 Hours 05/11/18 1700 05/18/18 1659    05/11/18 1026  piperacillin-tazobactam (ZOSYN) 4.5 g in iso-osmotic dextrose 100 mL IVPB (premix)     Ordering Provider:  Marcel Fagan MD    4.5 g  over 30 Minutes Intravenous Once 05/11/18 1100      05/11/18 1026  Pharmacy to dose vancomycin     Ordering Provider:  Marcel Fagan MD     Does not apply Continuous PRN 05/11/18 1025 05/18/18 1024          Allergies  Allergies as of 05/11/2018   • (No Known Allergies)       Labs      Results from last 7 days  Lab Units 05/11/18  0116   BUN mg/dL 34*   CREATININE mg/dL 1.23         Results from last 7 days  Lab Units 05/11/18  0116   WBC 10*3/mm3 9.85       Evaluation of Dosing     Last Dose Received in the ED/Outside Facility: vancomycin 1750mg IV 5/11 @ 0331    Ht -  162.6 cm  Wt -  83.5 kg    Estimated Creatinine Clearance: 63.8 mL/min (by C-G formula based on SCr of 1.23 mg/dL).    Intake & Output (last 3 days)       05/08 0701 - 05/09 0700 05/09 0701 - 05/10 0700 05/10 0701 - 05/11 0700 05/11 0701 - 05/12 0700    IV Piggyback    100    Total Intake       100    Net       +100                  Microbiology and Radiology  Microbiology Results (last 10 days)     ** No results found for the last 240 hours. **        Wound and blood cultures pending    Evaluation of  Level  Trough 5/12 @ 2230   Prior to third dose    Assessment/Plan:  1. Pharmacy consulted to dose vancomycin for SSTI.  Patient already received vancomycin 1750mg IV x 1 at 0330 today.  Will initiate vancomycin 1250mg IV q24h and plan on checking a trough 5/12 prior to 3rd dose.  2. Continue to check Scr/BUN/UOP while on vancomycin to monitor renal function.  3. Pharmacy will continue to follow.      Arlene Laboy, PharmD  5/11/2018  12:48 PM

## 2018-05-11 NOTE — ED NOTES
Called [facility] dispatch for BLS transport to [facility]. He said he will have [facility] to call me.     Debi Genao  05/11/18 0638

## 2018-05-11 NOTE — ED PROVIDER NOTES
Subjective     Lower Extremity Issue   Location:  Foot  Time since incident:  2 months (Worsening over the past 4 days)  Injury: no    Foot location:  L foot  Pain details:     Quality:  Aching, pressure and sharp    Radiates to:  Does not radiate    Severity:  Moderate    Onset quality:  Gradual    Duration:  2 months    Timing:  Constant    Progression:  Worsening  Chronicity:  Chronic  Dislocation: no    Foreign body present:  No foreign bodies  Tetanus status:  Up to date  Prior injury to area:  No  Relieved by:  Nothing  Worsened by:  Bearing weight and activity  Ineffective treatments: Stents in bilateral legs 10 days ago; 1st toe amputation 4 years ago; 4th toe amputation 1 year ago.  Associated symptoms: decreased ROM, fever and swelling    Associated symptoms: no back pain, no fatigue, no itching, no muscle weakness, no neck pain, no numbness, no stiffness and no tingling    Risk factors: no concern for non-accidental trauma, no frequent fractures, no known bone disorder, no obesity and no recent illness    Risk factors comment:  Type 2 DM (no insulin); smokes 1 ppd       Review of Systems   Constitutional: Positive for fever. Negative for activity change, appetite change, chills, diaphoresis, fatigue and unexpected weight change.   HENT: Negative.    Respiratory: Negative.    Cardiovascular: Negative.  Negative for chest pain.   Gastrointestinal: Negative.  Negative for abdominal pain.   Endocrine: Negative.    Genitourinary: Negative.  Negative for dysuria.   Musculoskeletal: Negative for arthralgias, back pain, gait problem, joint swelling, myalgias, neck pain, neck stiffness and stiffness.        Left foot pain and swelling   Skin: Negative.  Negative for itching.   Neurological: Negative.    Psychiatric/Behavioral: Negative.    All other systems reviewed and are negative.      Past Medical History:   Diagnosis Date   • Diabetes mellitus    • Gout    • Hypertension        No Known Allergies    Past  Surgical History:   Procedure Laterality Date   • AMPUTATION FOOT / TOE      amputation of toes to left foot       History reviewed. No pertinent family history.    Social History     Social History   • Marital status:      Social History Main Topics   • Smoking status: Current Every Day Smoker   • Drug use: Unknown     Other Topics Concern   • Not on file           Objective   Physical Exam   Constitutional: He is oriented to person, place, and time. He appears well-developed and well-nourished. No distress.   HENT:   Head: Normocephalic and atraumatic.   Right Ear: External ear normal.   Left Ear: External ear normal.   Nose: Nose normal.   Eyes: Conjunctivae and EOM are normal. Pupils are equal, round, and reactive to light.   Neck: Normal range of motion. Neck supple. No JVD present. No tracheal deviation present.   Cardiovascular: Normal rate, regular rhythm and normal heart sounds.  Exam reveals no gallop and no friction rub.    No murmur heard.  Pulmonary/Chest: Effort normal and breath sounds normal. No respiratory distress. He has no wheezes. He has no rales. He exhibits no tenderness.   Abdominal: Soft. Bowel sounds are normal. He exhibits no distension and no mass. There is no tenderness. There is no rebound and no guarding. No hernia.   Musculoskeletal: He exhibits edema and tenderness. He exhibits no deformity.   Left foot edema and erythema worsened at 1st toe stub as it has been amputated; 4th toe also amputated; 2nd, 3rd and 5th toes discolored.    Neurological: He is alert and oriented to person, place, and time. No cranial nerve deficit.   Skin: Skin is warm and dry. No rash noted. He is not diaphoretic. No erythema. No pallor.   Psychiatric: He has a normal mood and affect. His behavior is normal. Thought content normal.   Nursing note and vitals reviewed.      Procedures           ED Course  ED Course   Value Comment By Time   US Venous Doppler Lower Extremity Left (duplex) No DVT in LLE  per [facility] report.  SHAMIR Carroll 05/11 0359   US Arterial Doppler Lower Extremity Left Vascular disease but no occlusion of arteries per [facility] report.  SHAMIR Carroll 05/11 0400   US Arterial Doppler Lower Extremity Left Addendum: probable plaque within PFA and proximal SFA.  SHAMRI Carroll 05/11 0407    Spoke with Dr. Landin at [facility] and she is not willing to accept the patient in transfer because she believes he can be discharged home on oral antibiotics.  SHAMIR Carroll 05/11 0447    [facility] is being contacted at this time.  SHAMIR Carroll 05/11 0447    Patient has been accepted for transfer to [facility] under the care of Dr. Damon.  SHAMIR Carroll 05/11 2014                  MDM  Number of Diagnoses or Management Options  Cellulitis and abscess of toe of left foot:   Diabetic ulcer of toe of left foot associated with diabetes mellitus due to underlying condition, with bone involvement without evidence of necrosis:   PAD (peripheral artery disease):      Amount and/or Complexity of Data Reviewed  Clinical lab tests: ordered and reviewed  Tests in the radiology section of CPT®: ordered and reviewed  Discuss the patient with other providers: yes          Final diagnoses:   Cellulitis and abscess of toe of left foot   Diabetic ulcer of toe of left foot associated with diabetes mellitus due to underlying condition, with bone involvement without evidence of necrosis   PAD (peripheral artery disease)            SHAMIR Carroll  05/11/18 0594

## 2018-05-11 NOTE — ED NOTES
[facility]     Aleksandar Luis, HANNAH  05/11/18 0749

## 2018-05-11 NOTE — CONSULTS
CTS Consult    Patient Name:  Sandoval Hernandez and [id number]    Patient Care Team:  ALETA Plummer as PCP - General (Family Medicine)      Reason for Consult:  Gangrene of the left foot    HPI:  Patient is a 58 y.o. male who was admitted to [facility] earlier this morning with gangrene of his left foot which has been evolving over the past 2 weeks.  The patient has been a diabetic for many years and known peripheral vascular disease with recent endovascular procedures performed at [facility].  At the moment we do not have details of those endoscopic procedures.  Patient has known peripheral vascular disease with prior great toe and fourth toe amputations of this left foot.  Patient has been experiencing recent fever and chills.  On admission to the hospital today he had an elevated C-reactive protein but normal white blood count and normal serum lactic acid level.  We have been asked to see the patient in regards to possible need for amputation of the left lower extremity.    History:  Past Medical History:   Diagnosis Date   • Diabetes mellitus    • Gout    • Hypertension      Past Surgical History:   Procedure Laterality Date   • AMPUTATION FOOT / TOE      amputation of toes to left foot     No family history on file.  Social History   Substance Use Topics   • Smoking status: Current Every Day Smoker   • Smokeless tobacco: Not on file   • Alcohol use Not on file     Prescriptions Prior to Admission   Medication Sig Dispense Refill Last Dose   • allopurinol (ZYLOPRIM) 300 MG tablet Take 300 mg by mouth Daily.      • amLODIPine (NORVASC) 10 MG tablet Take 10 mg by mouth Daily.      • atorvastatin (LIPITOR) 20 MG tablet Take 20 mg by mouth Daily.      • carvedilol (COREG) 25 MG tablet Take 25 mg by mouth 2 (Two) Times a Day With Meals.      • CloNIDine (CATAPRES) 0.1 MG tablet Take 0.1 mg by mouth 2 (Two) Times a Day.      • fenofibrate (TRICOR) 145 MG tablet Take 145  mg by mouth Daily.      • ibuprofen (ADVIL,MOTRIN) 800 MG tablet Take 800 mg by mouth Every 6 (Six) Hours As Needed for Mild Pain .      • lisinopril (PRINIVIL,ZESTRIL) 40 MG tablet Take 40 mg by mouth Daily.      • metFORMIN (GLUCOPHAGE) 1000 MG tablet Take 1,000 mg by mouth 2 (Two) Times a Day With Meals.      • nitroglycerin (NITROSTAT) 0.4 MG SL tablet Place 0.4 mg under the tongue Every 5 (Five) Minutes As Needed for Chest Pain. Take no more than 3 doses in 15 minutes.      • rOPINIRole (REQUIP) 1 MG tablet Take 1 mg by mouth Every Night. Take 1 hour before bedtime.        Allergies:  Review of patient's allergies indicates no known allergies.      Review of Systems:       Constitutional:Recent fever and chills.  Left foot with ulcerations developing over the past 2 weeks        Respiratory: No history of hemoptysis chronic cough or sputum production.  No history of shortness of breath        Cardiovascular: No history of prior myocardial infarctions, angina, or heart failure        Gastrointestinal: No history of melena or hematochezia, constipation        Hematologic/lymphatic: No history of easy bruisability or bleeding       Musculoskeletal: No history of arthritis       Neurological: No history of stroke or seizures       Vascular: Prior vascular interventions endoscopically at [facility].  Prior left great toe and left fourth toe amputations for gangrene   Endocrine: History of diabetes for at least 10 years  Vital Signs  Temp:  [97.8 °F (36.6 °C)-98.9 °F (37.2 °C)] 98.6 °F (37 °C)  Heart Rate:  [74-92] 74  Resp:  [18] 18  BP: (111-164)/() 111/50    Physical Exam:       General Appearance: Patient's awake and alert appears somewhat disheveled       Head: Normocephalic, sclera clear       Neck: No history of bruits or palpable masses       Lungs: Clear bilaterally       Heart: Regular rhythm no murmurs heard       Abdomen: Soft good bowel sounds no masses no tenderness       Extremities: Left  lower extremity has absent left great toe and left fourth toe the second third and fifth toes are cyanotic and there is a large eschar ulceration over the lateral dorsal surface of his left foot.  Erythema is also present over the dorsum of the left foot       Pulses: I could not palpate any pedal pulses in the left foot or the right foot and could not palpate pulses in the popliteal of either leg       Skin: Cyanotic appearing especially in the left foot area       Neurologic: Cranial nerves II through XII grossly intact      Data Review:    Results from last 7 days  Lab Units 05/11/18  0116   WBC 10*3/mm3 9.85   HEMOGLOBIN g/dL 11.1*   HEMATOCRIT % 34.6*   PLATELETS 10*3/mm3 275       Results from last 7 days  Lab Units 05/11/18  0116   SODIUM mmol/L 138   POTASSIUM mmol/L 3.6   CHLORIDE mmol/L 108   CO2 mmol/L 20.3*   BUN mg/dL 34*   CREATININE mg/dL 1.23   GLUCOSE mg/dL 199*   CALCIUM mg/dL 10.8*     Coagulation: No results found for: INR, APTT    Imaging Review: 2 views of the left foot reviewed.  No gas seen in the soft tissues.  No obvious osseous changes to suggest overt osteomyelitis of the toes of the left foot      Assessment: #1.  Gangrene of the left foot involving the second third and fifth toes along with soft tissue involvement of the dorsum of the left foot along the lateral surface.  This gangrenous process has apparently occurred over the past 2 weeks  #2.  Diabetes mellitus  #3.  Severe peripheral vascular disease with recent endovascular procedures on the left leg for severe rest pain.    Plan: I will plan to proceed ahead with a left below-knee amputation.  Clinically it appears to me that this left foot is severely ischemic with the above noted gangrenous process and I do not think any further vascular procedures would rectify his ongoing gangrenous process.  The patient understands this and is agreeable for me to proceed ahead.  I will plan to do this procedure on 05/14/2018      Danie BILLY  MD Wai - 05/11/18, 4:12 PM

## 2018-05-11 NOTE — PROGRESS NOTES
Discharge Planning Assessment  [facility]     Patient Name: Sandoval Hernandez  MRN: [id number]  Today's Date: 5/11/2018    Admit Date: 5/11/2018          Discharge Needs Assessment     Row Name 05/11/18 1542       Living Environment    Lives With spouse    Current Living Arrangements home/apartment/condo   3 steps into home    Primary Care Provided by self    Family Caregiver if Needed spouse;parent(s);sibling(s)    Quality of Family Relationships helpful;involved;supportive    Able to Return to Prior Arrangements yes       Resource/Environmental Concerns    Resource/Environmental Concerns none    Transportation Concerns car, none       Transition Planning    Patient/Family Anticipates Transition to home with family;home with help/services;inpatient rehabilitation facility    Patient/Family Anticipated Services at Transition home health care;rehabilitation services    Transportation Anticipated family or friend will provide   May need help with transportation. Patient is checking with family members. He does not have a car.       Discharge Needs Assessment    Readmission Within the Last 30 Days no previous admission in last 30 days    Concerns to be Addressed discharge planning    Equipment Currently Used at Home crutches;cane, straight    Equipment Needed After Discharge --   TBD    Current Discharge Risk chronically ill;physical impairment            Discharge Plan     Row Name 05/11/18 2514       Plan    Plan TBD    Patient/Family in Agreement with Plan yes    Plan Comments Met with patient in the room to initiate discharge planning. Patient lives with his wife in a home in [facility] [place]. He is independent with ADLs and uses crutches or a self-made cane with mobility. He may need assistance with transportation if a family member cannot help him with a ride. Patient does not own a car. Patient is scheduled for surgery on Monday, 5/14/18. CM will f/u with patient after surgery to address discharge needs. He is amenable  to home health or skilled rehab if either one is recommended. CM will continue to follow.         Destination     No service coordination in this encounter.      Durable Medical Equipment     No service coordination in this encounter.      Dialysis/Infusion     No service coordination in this encounter.      Home Medical Care     No service coordination in this encounter.      Social Care     No service coordination in this encounter.                Demographic Summary     Row Name 05/11/18 1541       General Information    Admission Type inpatient    Referral Source admission list    Reason for Consult discharge planning    General Information Comments PCP is Radha Tan       Contact Information    Permission Granted to Share Info With ;family/designee   wife, Deirdre Hernandez    Contact Information Comments Deirdre Hernandez, wife, [phone]            Functional Status     Row Name 05/11/18 1542       Functional Status    Usual Activity Tolerance good    Current Activity Tolerance good       Functional Status, IADL    Medications independent    Meal Preparation independent    Housekeeping independent    Laundry independent    Shopping independent       Employment/    Employment/ Comments Confirmed patient has medical and rx coverage through [employer]; states he also has [place] [employer]; denies issues affording meds            Psychosocial    No documentation.           Abuse/Neglect    No documentation.           Legal    No documentation.           Substance Abuse    No documentation.           Patient Forms    No documentation.         Izabel Abraham

## 2018-05-12 PROBLEM — I10 HYPERTENSION: Status: ACTIVE | Noted: 2018-05-12

## 2018-05-12 LAB
ANION GAP SERPL CALCULATED.3IONS-SCNC: 9 MMOL/L (ref 3–11)
BUN BLD-MCNC: 18 MG/DL (ref 9–23)
BUN/CREAT SERPL: 22.5 (ref 7–25)
CALCIUM SPEC-SCNC: 9.3 MG/DL (ref 8.7–10.4)
CHLORIDE SERPL-SCNC: 107 MMOL/L (ref 99–109)
CO2 SERPL-SCNC: 25 MMOL/L (ref 20–31)
CREAT BLD-MCNC: 0.8 MG/DL (ref 0.6–1.3)
GFR SERPL CREATININE-BSD FRML MDRD: 99 ML/MIN/1.73
GLUCOSE BLD-MCNC: 118 MG/DL (ref 70–100)
GLUCOSE BLDC GLUCOMTR-MCNC: 111 MG/DL (ref 70–130)
GLUCOSE BLDC GLUCOMTR-MCNC: 112 MG/DL (ref 70–130)
GLUCOSE BLDC GLUCOMTR-MCNC: 129 MG/DL (ref 70–130)
GLUCOSE BLDC GLUCOMTR-MCNC: 174 MG/DL (ref 70–130)
HBA1C MFR BLD: 7.3 % (ref 4.8–5.6)
POTASSIUM BLD-SCNC: 4.3 MMOL/L (ref 3.5–5.5)
SODIUM BLD-SCNC: 141 MMOL/L (ref 132–146)

## 2018-05-12 PROCEDURE — 25010000002 PIPERACILLIN SOD-TAZOBACTAM PER 1 G: Performed by: INTERNAL MEDICINE

## 2018-05-12 PROCEDURE — 99231 SBSQ HOSP IP/OBS SF/LOW 25: CPT | Performed by: THORACIC SURGERY (CARDIOTHORACIC VASCULAR SURGERY)

## 2018-05-12 PROCEDURE — 99233 SBSQ HOSP IP/OBS HIGH 50: CPT | Performed by: INTERNAL MEDICINE

## 2018-05-12 PROCEDURE — 86901 BLOOD TYPING SEROLOGIC RH(D): CPT

## 2018-05-12 PROCEDURE — 83036 HEMOGLOBIN GLYCOSYLATED A1C: CPT | Performed by: INTERNAL MEDICINE

## 2018-05-12 PROCEDURE — 80048 BASIC METABOLIC PNL TOTAL CA: CPT | Performed by: INTERNAL MEDICINE

## 2018-05-12 PROCEDURE — 86900 BLOOD TYPING SEROLOGIC ABO: CPT

## 2018-05-12 PROCEDURE — 82962 GLUCOSE BLOOD TEST: CPT

## 2018-05-12 PROCEDURE — 25010000002 HEPARIN (PORCINE) PER 1000 UNITS: Performed by: INTERNAL MEDICINE

## 2018-05-12 RX ADMIN — ROPINIROLE 1 MG: 0.5 TABLET, FILM COATED ORAL at 21:19

## 2018-05-12 RX ADMIN — HEPARIN SODIUM 5000 UNITS: 5000 INJECTION, SOLUTION INTRAVENOUS; SUBCUTANEOUS at 15:29

## 2018-05-12 RX ADMIN — AMLODIPINE BESYLATE 10 MG: 10 TABLET ORAL at 08:30

## 2018-05-12 RX ADMIN — NICOTINE 1 PATCH: 21 PATCH, EXTENDED RELEASE TRANSDERMAL at 08:30

## 2018-05-12 RX ADMIN — CARVEDILOL 25 MG: 12.5 TABLET, FILM COATED ORAL at 17:25

## 2018-05-12 RX ADMIN — ASPIRIN 81 MG 324 MG: 81 TABLET ORAL at 08:30

## 2018-05-12 RX ADMIN — CLONIDINE HYDROCHLORIDE 0.1 MG: 0.1 TABLET ORAL at 08:30

## 2018-05-12 RX ADMIN — HEPARIN SODIUM 5000 UNITS: 5000 INJECTION, SOLUTION INTRAVENOUS; SUBCUTANEOUS at 05:55

## 2018-05-12 RX ADMIN — DOXYCYCLINE 100 MG: 100 CAPSULE ORAL at 21:19

## 2018-05-12 RX ADMIN — CLONIDINE HYDROCHLORIDE 0.1 MG: 0.1 TABLET ORAL at 21:19

## 2018-05-12 RX ADMIN — TAZOBACTAM SODIUM AND PIPERACILLIN SODIUM 4.5 G: 500; 4 INJECTION, SOLUTION INTRAVENOUS at 08:33

## 2018-05-12 RX ADMIN — DOXYCYCLINE 100 MG: 100 CAPSULE ORAL at 08:30

## 2018-05-12 RX ADMIN — TAZOBACTAM SODIUM AND PIPERACILLIN SODIUM 4.5 G: 500; 4 INJECTION, SOLUTION INTRAVENOUS at 17:25

## 2018-05-12 RX ADMIN — LISINOPRIL 40 MG: 20 TABLET ORAL at 08:30

## 2018-05-12 RX ADMIN — CARVEDILOL 25 MG: 12.5 TABLET, FILM COATED ORAL at 08:30

## 2018-05-12 RX ADMIN — OXYCODONE HYDROCHLORIDE AND ACETAMINOPHEN 1 TABLET: 5; 325 TABLET ORAL at 15:29

## 2018-05-12 RX ADMIN — ATORVASTATIN CALCIUM 80 MG: 40 TABLET, FILM COATED ORAL at 21:19

## 2018-05-12 RX ADMIN — INSULIN LISPRO 2 UNITS: 100 INJECTION, SOLUTION INTRAVENOUS; SUBCUTANEOUS at 11:46

## 2018-05-12 RX ADMIN — OXYCODONE HYDROCHLORIDE AND ACETAMINOPHEN 1 TABLET: 5; 325 TABLET ORAL at 21:20

## 2018-05-12 RX ADMIN — OXYCODONE HYDROCHLORIDE AND ACETAMINOPHEN 1 TABLET: 5; 325 TABLET ORAL at 05:55

## 2018-05-12 RX ADMIN — TAZOBACTAM SODIUM AND PIPERACILLIN SODIUM 4.5 G: 500; 4 INJECTION, SOLUTION INTRAVENOUS at 00:16

## 2018-05-12 RX ADMIN — ALLOPURINOL 300 MG: 300 TABLET ORAL at 08:30

## 2018-05-12 RX ADMIN — HEPARIN SODIUM 5000 UNITS: 5000 INJECTION, SOLUTION INTRAVENOUS; SUBCUTANEOUS at 21:19

## 2018-05-12 NOTE — PROGRESS NOTES
[facility] Medicine Services  PROGRESS NOTE    Patient Name: Sandoval Hernandez  : 1960  MRN: [id number]    Date of Admission: 2018  Length of Stay: 1  Primary Care Physician: ALETA Plummer    Subjective   Subjective     CC:  Foot gangrene    HPI:  No problems overnight.  Stable foot pain.  Ready for BKA on Monday.    Review of Systems   Constitutional: Negative for fever.   Respiratory: Negative for shortness of breath.    Cardiovascular: Negative for chest pain.   Gastrointestinal: Negative for abdominal pain.   Musculoskeletal:        + foot pain   All other systems reviewed and are negative.        Otherwise ROS is negative except as mentioned in the HPI.    Objective   Objective     Vital Signs:   Temp:  [98 °F (36.7 °C)-98.6 °F (37 °C)] 98.6 °F (37 °C)  Heart Rate:  [69-91] 78  Resp:  [16-18] 18  BP: (111-164)/(50-83) 149/82        Physical Exam:  Constitutional: No acute distress, awake, alert  HENT: NCAT, mucous membranes moist, poor dentition  Respiratory: Clear to auscultation bilaterally, respiratory effort normal   Cardiovascular: RRR, no murmurs, rubs, or gallops  Gastrointestinal: Positive bowel sounds, soft, nontender, nondistended  Musculoskeletal: left foot heavily wrapped, did not examine  Psychiatric: Appropriate affect, cooperative  Neurologic: Oriented x 3, strength symmetric in all extremities, Cranial Nerves grossly intact to confrontation, speech clear  Skin: No rashes      Results Reviewed:  I have personally reviewed current lab, radiology, and data and agree.      Results from last 7 days  Lab Units 18  0116   WBC 10*3/mm3 9.85   HEMOGLOBIN g/dL 11.1*   HEMATOCRIT % 34.6*   PLATELETS 10*3/mm3 275       Results from last 7 days  Lab Units 18  0536 18  0116   SODIUM mmol/L 141 138   POTASSIUM mmol/L 4.3 3.6   CHLORIDE mmol/L 107 108   CO2 mmol/L 25.0 20.3*   BUN mg/dL 18 34*   CREATININE mg/dL 0.80 1.23   GLUCOSE mg/dL 118* 199*    CALCIUM mg/dL 9.3 10.8*   ALT (SGPT) U/L  --  83*   AST (SGOT) U/L  --  68*     Estimated Creatinine Clearance: 98.7 mL/min (by C-G formula based on SCr of 0.8 mg/dL).  BNP   Date Value Ref Range Status   05/11/2018 5.0 0.0 - 100.0 pg/mL Final     No results found for: PHART    Microbiology Results Abnormal     None          Imaging Results (last 24 hours)     Procedure Component Value Units Date/Time    XR Toe 2+ View Left [[id number]] Collected:  05/11/18 2033     Updated:  05/11/18 2033    Narrative:          EXAMINATION: XR TOE 2+ VW LEFT - 05/11/2018     INDICATION:  [id number], not elsewhere classified.     COMPARISON: None.     FINDINGS: Status post amputations at the level of the base of the  proximal phalanx first and fourth digits with cortical margins fairly  well-preserved. No evidence for definitive osseous erosion or soft  tissue gas. Remaining non amputated digits grossly unremarkable with  preserved joint spaces. No acute fracture.           Impression:       Post amputation changes first and fourth digits without  evidence for osseous erosion or lucency to suggest osteolytic change. No  soft tissue emphysema. Further imaging may be useful if symptoms persist  and/or progress to fully evaluate for underlying osteomyelitis.     DICTATED:     05/11/2018  EDITED/ls :     05/11/2018                 I have reviewed the medications.    Assessment/Plan   Assessment / Plan     Hospital Problem List     * (Principal)Gangrene of left foot    Cellulitis of left lower extremity    PAD (peripheral artery disease)    Diabetes mellitus, type 2    Tobacco abuse    Renal insufficiency    Hypertension             Brief Hospital Course to date:  Sandoval Hernandez is a 58 y.o. male with a history of type 2 diabetes, ongoing tobacco abuse, renal specialist and see, peripheral arterial disease who presented to [facility] with a gangrenous left foot.  Records from [facility] indicate he was just discharged later on  May 8 and during that stay had a stenting of his left common iliac artery and left superior femoral artery.  He also required repeat intervention to the left superficial femoral artery stent due to fracture.  After discharge with continued pain and gangrene and is transferred to [place] for further evaluation.    Assessment & Plan:  - outside records reviewed with results as above  - appreciate Dr. Jaxon adan, continue zosyn and doxy  - d/w Dr. Carreno, anticipate left BKA on Monday.  Patient is agreeable and wishing to proceed.  - continue ASA and will resume plavix post-op  - continue increased statin dose.  - a1c pending, currently acceptable glc on current insulin.  - continue nicotine patch, counseled regarding need for cessation  - Cr down to 0.8 from 1.2 in [place]  - will likely need rehab after his BKA, will d/w CM on Monday    DVT Prophylaxis:  heparin    CODE STATUS: Full Code    Disposition: I expect the patient to be discharged TBD    Electronically signed by Marcel Fagan MD, 05/12/18, 8:20 AM.

## 2018-05-12 NOTE — PLAN OF CARE
Problem: Fall Risk (Adult)  Goal: Identify Related Risk Factors and Signs and Symptoms  Outcome: Outcome(s) achieved Date Met: 05/12/18    Goal: Absence of Fall  Outcome: Ongoing (interventions implemented as appropriate)      Problem: Patient Care Overview  Goal: Plan of Care Review  Outcome: Ongoing (interventions implemented as appropriate)   05/12/18 0444   Coping/Psychosocial   Plan of Care Reviewed With patient   Plan of Care Review   Progress no change   OTHER   Outcome Summary VSS, NSR on monitor, on room air. Has recieved PRN meds for pain overnight. Plan to have Left BKA on monday.        Problem: Infection, Risk/Actual (Adult)  Goal: Identify Related Risk Factors and Signs and Symptoms  Outcome: Outcome(s) achieved Date Met: 05/12/18    Goal: Infection Prevention/Resolution  Outcome: Ongoing (interventions implemented as appropriate)

## 2018-05-12 NOTE — PROGRESS NOTES
Cardiothoracic Surgery Progress Note      POD #:     LOS: 1 day      Subjective: Patient's awake alert    Chief Complaint: Major complaint is left foot ischemic pain    Objective:  Vital Signs vital signs below are noted  Temp:  [98 °F (36.7 °C)-98.6 °F (37 °C)] 98.6 °F (37 °C)  Heart Rate:  [69-91] 78  Resp:  [16-18] 18  BP: (111-164)/(50-83) 149/82    Physical Exam:   General Appearance: Alert and oriented ×3   Lungs:   Heart:   Skin:   Incision:   There appears to be more skin bolus changes over the left foot in the area of the ischemia/ eschar formation and gangrene.  Erythema is on the dorsum of the foot  Results: Laboratory results below are noted    Results from last 7 days  Lab Units 05/11/18  0116   WBC 10*3/mm3 9.85   HEMOGLOBIN g/dL 11.1*   HEMATOCRIT % 34.6*   PLATELETS 10*3/mm3 275       Results from last 7 days  Lab Units 05/12/18  0536   SODIUM mmol/L 141   POTASSIUM mmol/L 4.3   CHLORIDE mmol/L 107   CO2 mmol/L 25.0   BUN mg/dL 18   CREATININE mg/dL 0.80   GLUCOSE mg/dL 118*   CALCIUM mg/dL 9.3         Assessment:#1.  Ischemic gangrene of left foot with dry gangrene as well as wet gangrene of the remainder toes of the left foot and on the dorsum of the foot  #2.  Severe peripheral vascular disease both lower extremities  #3.  Diabetes mellitus  Plan: We will plan to do a left below-knee amputation on Monday, 05/14/2018.  IV antibiotics per infectious disease and overall medical management per hospitalist      Danie Carreno MD - 05/12/18 - 7:37 AM

## 2018-05-12 NOTE — PROGRESS NOTES
Sandoval Hernandez  1960  [id number]  5/12/2018    CC: Left lower extremity ischemic pain.    Sandoval Hernandez is a 58 y.o. male here for evolving left lower extremity wet gangrene.      Past medical history:  Past Medical History:   Diagnosis Date   • Diabetes mellitus    • Gout    • Hypertension        Medications:   Current Facility-Administered Medications:   •  allopurinol (ZYLOPRIM) tablet 300 mg, 300 mg, Oral, Daily, Marcel Fagan MD, 300 mg at 05/12/18 0830  •  amLODIPine (NORVASC) tablet 10 mg, 10 mg, Oral, Daily, Marcel Fagan MD, 10 mg at 05/12/18 0830  •  aspirin chewable tablet 324 mg, 324 mg, Oral, Daily, Marcel Fagan MD, 324 mg at 05/12/18 0830  •  atorvastatin (LIPITOR) tablet 80 mg, 80 mg, Oral, Nightly, Marcel Fagan MD, 80 mg at 05/11/18 2141  •  carvedilol (COREG) tablet 25 mg, 25 mg, Oral, BID With Meals, Marcel Fagan MD, 25 mg at 05/12/18 0830  •  CloNIDine (CATAPRES) tablet 0.1 mg, 0.1 mg, Oral, Q12H, Marcel Fagan MD, 0.1 mg at 05/12/18 0830  •  dextrose (D50W) solution 25 g, 25 g, Intravenous, Q15 Min PRN, Marcel Fagan MD  •  dextrose (GLUTOSE) oral gel 15 g, 15 g, Oral, Q15 Min PRN, Marcel Fagan MD  •  doxycycline (MONODOX) capsule 100 mg, 100 mg, Oral, Q12H, Dc Coates MD, 100 mg at 05/12/18 0830  •  glucagon (human recombinant) (GLUCAGEN DIAGNOSTIC) injection 1 mg, 1 mg, Subcutaneous, PRN, Marcel Fagan MD  •  heparin (porcine) 5000 UNIT/ML injection 5,000 Units, 5,000 Units, Subcutaneous, Q8H, Marcel Fagan MD, 5,000 Units at 05/12/18 0555  •  insulin lispro (humaLOG) injection 0-9 Units, 0-9 Units, Subcutaneous, 4x Daily With Meals & Nightly, Marcel Fagan MD  •  lisinopril (PRINIVIL,ZESTRIL) tablet 40 mg, 40 mg, Oral, Daily, Marcel Fagan MD, 40 mg at 05/12/18 0830  •  nicotine (NICODERM CQ) 21 MG/24HR patch 1 patch, 1 patch, Transdermal, Daily, Marcel Fagan MD, 1 patch at 05/12/18 0830  •  oxyCODONE-acetaminophen (PERCOCET) 5-325 MG per tablet 1 tablet, 1  "tablet, Oral, Q4H PRN, Marcel Fagan MD, 1 tablet at 05/12/18 0555  •  piperacillin-tazobactam (ZOSYN) 4.5 g in iso-osmotic dextrose 100 mL IVPB (premix), 4.5 g, Intravenous, Q8H, Marcel Fagan MD, 4.5 g at 05/12/18 0833  •  rOPINIRole (REQUIP) tablet 1 mg, 1 mg, Oral, Nightly, Marcel Fagan MD, 1 mg at 05/11/18 2141  •  sodium chloride 0.9 % flush 1-10 mL, 1-10 mL, Intravenous, PRN, Marcel Fagan MD  •  Tdap (BOOSTRIX) injection 0.5 mL, 0.5 mL, Intramuscular, During Hospitalization, Dc Coates MD  Antibiotics:  Anti-Infectives     Ordered     Dose/Rate Route Frequency Start Stop    05/11/18 1341  doxycycline (MONODOX) capsule 100 mg     Ordering Provider:  Dc Coates MD    100 mg Oral Every 12 Hours Scheduled 05/11/18 1800 05/18/18 2059 05/11/18 1026  piperacillin-tazobactam (ZOSYN) 4.5 g in iso-osmotic dextrose 100 mL IVPB (premix)     Ordering Provider:  Marcel Fagan MD    4.5 g  over 4 Hours Intravenous Every 8 Hours 05/11/18 1700 05/18/18 1659    05/11/18 1026  piperacillin-tazobactam (ZOSYN) 4.5 g in iso-osmotic dextrose 100 mL IVPB (premix)     Ordering Provider:  Marcel Fagan MD    4.5 g  over 30 Minutes Intravenous Once 05/11/18 1100 05/11/18 1254          Allergies:  has No Known Allergies.    Review of Systems: All other reviewed and negative except as per HPI    Blood pressure 133/70, pulse 80, temperature 98.6 °F (37 °C), resp. rate 18, height 162.6 cm (64.02\"), weight 84.4 kg (186 lb), SpO2 97 %.  GENERAL: Awake and alert, in no acute distress. Sitting on the side of the bed.  Denies fever chills or night sweats.  Minimal discomfort in left lower extremity but profoundly neuropathic.  HEENT: Oropharynx without thrush. Sinuses nontender. Dentition in poor repair with a few remaining carious teeth. No cervical adenopathy. No carotid bruits/ jugular venous distention.   EYES: PERRL. No conjunctival injection. No icterus. EOMI.  LYMPHATICS: No lymphadenopathy of the neck or " axillary or inguinal regions.   HEART: No murmur, gallop, or pericardial friction rub.   LUNGS: Clear to auscultation anteriorly. No percussion dullness.   ABDOMEN: Soft, nontender, nondistended. No appreciable HSM.    SKIN: Warm and dry without cutaneous eruptions. No embolic stigmata.  Hemorrhagic bullae over the dorsal lateral left foot.  Palpable dorsalis pedis and posterior tibialis pulses.  New lesions since yesterday's exam.  Cool to touch.  PSYCHIATRIC: Mental status lucid. Cranial nerve function intact.       DIAGNOSTICS:  Lab Results   Component Value Date    WBC 9.85 05/11/2018    HGB 11.1 (L) 05/11/2018    HCT 34.6 (L) 05/11/2018     05/11/2018     Lab Results   Component Value Date    CRP 17.26 (H) 05/11/2018     Lab Results   Component Value Date    SEDRATE 36 (H) 05/11/2018     Lab Results   Component Value Date    GLUCOSE 118 (H) 05/12/2018    BUN 18 05/12/2018    CREATININE 0.80 05/12/2018    EGFRIFNONA 99 05/12/2018    BCR 22.5 05/12/2018    CO2 25.0 05/12/2018    CALCIUM 9.3 05/12/2018    ALBUMIN 4.60 05/11/2018    LABIL2 1.3 (L) 05/11/2018    AST 68 (H) 05/11/2018    ALT 83 (H) 05/11/2018       Microbiology:Blood cultures ×2 negative at less than 24 hours.  Wound culture reintubated.    RADIOLOGY:  Imaging Results (last 72 hours)     Procedure Component Value Units Date/Time    XR Toe 2+ View Left [[id number]] Collected:  05/11/18 2033     Updated:  05/11/18 2033    Narrative:          EXAMINATION: XR TOE 2+ VW LEFT - 05/11/2018     INDICATION:  [id number], not elsewhere classified.     COMPARISON: None.     FINDINGS: Status post amputations at the level of the base of the  proximal phalanx first and fourth digits with cortical margins fairly  well-preserved. No evidence for definitive osseous erosion or soft  tissue gas. Remaining non amputated digits grossly unremarkable with  preserved joint spaces. No acute fracture.           Impression:       Post amputation changes first and fourth  digits without  evidence for osseous erosion or lucency to suggest osteolytic change. No  soft tissue emphysema. Further imaging may be useful if symptoms persist  and/or progress to fully evaluate for underlying osteomyelitis.     DICTATED:     05/11/2018  EDITED/ls :     05/11/2018              Assessment and Plan: Long-standing diabetes mellitus.  Peripheral arterial disease.  Peripheral neuropathy.  Recent left femoral stent.  Probable atheroembolic event.  Digital gangrene.  Elevated inflammatory markers.  Bullous cellulitis.  Ongoing tobacco abuse.  Severe periodontal disease.  Liver function abnormalities.  Maximum temperature over 24 hours 98.6°.  Hemodynamically stable.  Blood cultures ×2 negative at 24 hours.  Wound culture reintubated.  Continues on oral doxycycline and piperacillin tazobactam.  Anticipated left BKA on Monday.  Discussed with Dr. Carreno at length.      Dc Coates MD  5/12/2018

## 2018-05-13 LAB
ABO GROUP BLD: NORMAL
ABO GROUP BLD: NORMAL
BACTERIA SPEC AEROBE CULT: NORMAL
BLD GP AB SCN SERPL QL: NEGATIVE
GLUCOSE BLDC GLUCOMTR-MCNC: 102 MG/DL (ref 70–130)
GLUCOSE BLDC GLUCOMTR-MCNC: 113 MG/DL (ref 70–130)
GLUCOSE BLDC GLUCOMTR-MCNC: 118 MG/DL (ref 70–130)
GLUCOSE BLDC GLUCOMTR-MCNC: 135 MG/DL (ref 70–130)
GRAM STN SPEC: NORMAL
RH BLD: POSITIVE
RH BLD: POSITIVE
T&S EXPIRATION DATE: NORMAL

## 2018-05-13 PROCEDURE — 25010000002 HEPARIN (PORCINE) PER 1000 UNITS: Performed by: INTERNAL MEDICINE

## 2018-05-13 PROCEDURE — 25010000002 PIPERACILLIN SOD-TAZOBACTAM PER 1 G: Performed by: INTERNAL MEDICINE

## 2018-05-13 PROCEDURE — 63710000001 INSULIN LISPRO (HUMAN) PER 5 UNITS: Performed by: INTERNAL MEDICINE

## 2018-05-13 PROCEDURE — 86901 BLOOD TYPING SEROLOGIC RH(D): CPT | Performed by: THORACIC SURGERY (CARDIOTHORACIC VASCULAR SURGERY)

## 2018-05-13 PROCEDURE — 86850 RBC ANTIBODY SCREEN: CPT | Performed by: THORACIC SURGERY (CARDIOTHORACIC VASCULAR SURGERY)

## 2018-05-13 PROCEDURE — 82962 GLUCOSE BLOOD TEST: CPT

## 2018-05-13 PROCEDURE — 99232 SBSQ HOSP IP/OBS MODERATE 35: CPT | Performed by: NURSE PRACTITIONER

## 2018-05-13 PROCEDURE — 99024 POSTOP FOLLOW-UP VISIT: CPT | Performed by: THORACIC SURGERY (CARDIOTHORACIC VASCULAR SURGERY)

## 2018-05-13 PROCEDURE — 86900 BLOOD TYPING SEROLOGIC ABO: CPT | Performed by: THORACIC SURGERY (CARDIOTHORACIC VASCULAR SURGERY)

## 2018-05-13 PROCEDURE — 86920 COMPATIBILITY TEST SPIN: CPT

## 2018-05-13 RX ADMIN — LISINOPRIL 40 MG: 20 TABLET ORAL at 09:38

## 2018-05-13 RX ADMIN — TAZOBACTAM SODIUM AND PIPERACILLIN SODIUM 4.5 G: 500; 4 INJECTION, SOLUTION INTRAVENOUS at 09:37

## 2018-05-13 RX ADMIN — CARVEDILOL 25 MG: 12.5 TABLET, FILM COATED ORAL at 09:38

## 2018-05-13 RX ADMIN — AMLODIPINE BESYLATE 10 MG: 10 TABLET ORAL at 09:38

## 2018-05-13 RX ADMIN — TAZOBACTAM SODIUM AND PIPERACILLIN SODIUM 4.5 G: 500; 4 INJECTION, SOLUTION INTRAVENOUS at 02:28

## 2018-05-13 RX ADMIN — DOXYCYCLINE 100 MG: 100 CAPSULE ORAL at 21:22

## 2018-05-13 RX ADMIN — OXYCODONE HYDROCHLORIDE AND ACETAMINOPHEN 1 TABLET: 5; 325 TABLET ORAL at 12:13

## 2018-05-13 RX ADMIN — CLONIDINE HYDROCHLORIDE 0.1 MG: 0.1 TABLET ORAL at 09:38

## 2018-05-13 RX ADMIN — ALLOPURINOL 300 MG: 300 TABLET ORAL at 09:38

## 2018-05-13 RX ADMIN — DOXYCYCLINE 100 MG: 100 CAPSULE ORAL at 09:37

## 2018-05-13 RX ADMIN — HEPARIN SODIUM 5000 UNITS: 5000 INJECTION, SOLUTION INTRAVENOUS; SUBCUTANEOUS at 21:30

## 2018-05-13 RX ADMIN — ATORVASTATIN CALCIUM 80 MG: 40 TABLET, FILM COATED ORAL at 21:22

## 2018-05-13 RX ADMIN — HEPARIN SODIUM 5000 UNITS: 5000 INJECTION, SOLUTION INTRAVENOUS; SUBCUTANEOUS at 14:54

## 2018-05-13 RX ADMIN — TAZOBACTAM SODIUM AND PIPERACILLIN SODIUM 4.5 G: 500; 4 INJECTION, SOLUTION INTRAVENOUS at 17:07

## 2018-05-13 RX ADMIN — OXYCODONE HYDROCHLORIDE AND ACETAMINOPHEN 1 TABLET: 5; 325 TABLET ORAL at 02:35

## 2018-05-13 RX ADMIN — ROPINIROLE 1 MG: 0.5 TABLET, FILM COATED ORAL at 21:22

## 2018-05-13 RX ADMIN — HEPARIN SODIUM 5000 UNITS: 5000 INJECTION, SOLUTION INTRAVENOUS; SUBCUTANEOUS at 05:54

## 2018-05-13 RX ADMIN — OXYCODONE HYDROCHLORIDE AND ACETAMINOPHEN 1 TABLET: 5; 325 TABLET ORAL at 06:25

## 2018-05-13 RX ADMIN — CARVEDILOL 25 MG: 12.5 TABLET, FILM COATED ORAL at 17:07

## 2018-05-13 RX ADMIN — ASPIRIN 81 MG 324 MG: 81 TABLET ORAL at 09:38

## 2018-05-13 RX ADMIN — CLONIDINE HYDROCHLORIDE 0.1 MG: 0.1 TABLET ORAL at 21:22

## 2018-05-13 RX ADMIN — OXYCODONE HYDROCHLORIDE AND ACETAMINOPHEN 1 TABLET: 5; 325 TABLET ORAL at 21:22

## 2018-05-13 NOTE — PROGRESS NOTES
Cardiothoracic Surgery Progress Note      POD #:     LOS: 2 days      Subjective: Awake alert     Chief Complaint: Still complains a left ischemic foot    Objective:  Vital Signs are signs below are noted  Temp:  [98.3 °F (36.8 °C)-98.6 °F (37 °C)] 98.3 °F (36.8 °C)  Heart Rate:  [72-80] 72  Resp:  [16-18] 16  BP: ()/(63-70) 137/68    Physical Exam:   General Appearance: Oriented ×3   Lungs:   Heart:   Skin:   Incision:   Left foot ulceration inspected new dressing applied with Xeroform and Kerlix wrap  Results: Laboratory results below are noted    Results from last 7 days  Lab Units 05/11/18  0116   WBC 10*3/mm3 9.85   HEMOGLOBIN g/dL 11.1*   HEMATOCRIT % 34.6*   PLATELETS 10*3/mm3 275       Results from last 7 days  Lab Units 05/12/18  0536   SODIUM mmol/L 141   POTASSIUM mmol/L 4.3   CHLORIDE mmol/L 107   CO2 mmol/L 25.0   BUN mg/dL 18   CREATININE mg/dL 0.80   GLUCOSE mg/dL 118*   CALCIUM mg/dL 9.3         Assessment:#1.  Ischemic gangrene left foot with cellulitis secondary to severe peripheral vascular disease  #2.  Severe peripheral vas disease of both lower extremities-failed endovascular procedure of left lower leg  #3.  Diabetes mellitus with diabetic neuropathy  Plan: We will plan left below-knee amputation on Monday, 05/14/2018.  I discussed the risk and benefits of the surgery to include bleeding,  infection, stroke, heart attack, death, and possible postop phantom pain.  Patient agrees to proceed ahead.  Patient is aware that we may have to proceed to a amputation at higher level in the future if this amputation stump does not heal      Danie Carreno MD - 05/13/18 - 7:54 AM

## 2018-05-13 NOTE — PLAN OF CARE
Problem: Infection, Risk/Actual (Adult)  Goal: Infection Prevention/Resolution  Outcome: Ongoing (interventions implemented as appropriate)

## 2018-05-13 NOTE — PROGRESS NOTES
[facility] Medicine Services  PROGRESS NOTE    Patient Name: Sandoval Hernandez  : 1960  MRN: [id number]    Date of Admission: 2018  Length of Stay: 2  Primary Care Physician: ALETA Plummer    Subjective   Subjective     CC:  Foot gangrene    HPI:  Patient resting in bed in NAD. He feels good. Pain minimal. Appetite good. Had BM. No acute events overnight per nursing. .    Review of Systems   Constitutional: Negative for appetite change and fever.   Respiratory: Negative for shortness of breath.    Cardiovascular: Negative for chest pain.   Gastrointestinal: Negative for abdominal pain.   Genitourinary: Negative for dysuria.   Musculoskeletal:        + foot pain   All other systems reviewed and are negative.        Otherwise ROS is negative except as mentioned in the HPI.    Objective   Objective     Vital Signs:   Temp:  [98.3 °F (36.8 °C)-98.6 °F (37 °C)] 98.6 °F (37 °C)  Heart Rate:  [62-77] 62  Resp:  [16-18] 17  BP: (115-149)/(61-76) 131/62        Physical Exam:  Constitutional: No acute distress, awake, alert  HENT: NCAT, mucous membranes moist, poor dentition  Respiratory: Clear to auscultation bilaterally, respiratory effort normal   Cardiovascular: RRR, no murmurs, rubs, or gallops  Gastrointestinal: Positive bowel sounds, soft, nontender, nondistended  Musculoskeletal: left foot heavily wrapped, did not examine  Psychiatric: Appropriate affect, cooperative  Neurologic: Oriented x 3, strength symmetric in all extremities, Cranial Nerves grossly intact to confrontation, speech clear  Skin: No rashes      Results Reviewed:  I have personally reviewed current lab, radiology, and data and agree.      Results from last 7 days  Lab Units 18  0116   WBC 10*3/mm3 9.85   HEMOGLOBIN g/dL 11.1*   HEMATOCRIT % 34.6*   PLATELETS 10*3/mm3 275       Results from last 7 days  Lab Units 18  0536 18  0116   SODIUM mmol/L 141 138   POTASSIUM mmol/L 4.3 3.6   CHLORIDE  mmol/L 107 108   CO2 mmol/L 25.0 20.3*   BUN mg/dL 18 34*   CREATININE mg/dL 0.80 1.23   GLUCOSE mg/dL 118* 199*   CALCIUM mg/dL 9.3 10.8*   ALT (SGPT) U/L  --  83*   AST (SGOT) U/L  --  68*     Estimated Creatinine Clearance: 98.7 mL/min (by C-G formula based on SCr of 0.8 mg/dL).  BNP   Date Value Ref Range Status   05/11/2018 5.0 0.0 - 100.0 pg/mL Final     No results found for: PHART    Microbiology Results Abnormal     Procedure Component Value - Date/Time    Wound Culture - Wound, Foot [[id number]]  (Normal) Collected:  05/11/18 1111    Lab Status:  Final result Specimen:  Wound from Foot Updated:  05/13/18 1055     Wound Culture No growth at 2 days     Gram Stain Result No WBCs or organisms seen          Imaging Results (last 24 hours)     ** No results found for the last 24 hours. **             I have reviewed the medications.    Assessment/Plan   Assessment / Plan     Hospital Problem List     * (Principal)Gangrene of left foot    Cellulitis of left lower extremity    PAD (peripheral artery disease)    Diabetes mellitus, type 2    Tobacco abuse    Renal insufficiency    Hypertension             Brief Hospital Course to date:  Sandoval Hernandez is a 58 y.o. male with a history of type 2 diabetes, ongoing tobacco abuse, renal specialist and see, peripheral arterial disease who presented to [facility] with a gangrenous left foot.  Records from [facility] indicate he was just discharged later on May 8 and during that stay had a stenting of his left common iliac artery and left superior femoral artery.  He also required repeat intervention to the left superficial femoral artery stent due to fracture.  After discharge with continued pain and gangrene and is transferred to [place] for further evaluation.    Assessment & Plan:  - outside records reviewed with results as above  - appreciate Dr. Jaxon adan, continue zosyn and doxy  - d/w Dr. Carreno, anticipate left BKA on Monday.  Patient is agreeable and  wishing to proceed.  - continue ASA and will resume plavix post-op  - continue increased statin dose.  - a1c 7.3% , currently acceptable glc on current insulin.  - continue nicotine patch, counseled regarding need for cessation  - Cr down to 0.8 from 1.2 in [place]  - will likely need rehab after his BKA, will d/w CM on Monday    DVT Prophylaxis:  heparin    CODE STATUS: Full Code    Disposition: I expect the patient to be discharged TBD    Electronically signed by ALETA Devries, 05/13/18, 12:24 PM.

## 2018-05-13 NOTE — PLAN OF CARE
Problem: Fall Risk (Adult)  Goal: Absence of Fall  Outcome: Ongoing (interventions implemented as appropriate)   05/13/18 1811   Fall Risk (Adult)   Absence of Fall making progress toward outcome       Problem: Patient Care Overview  Goal: Plan of Care Review  Outcome: Ongoing (interventions implemented as appropriate)   05/13/18 1811   Coping/Psychosocial   Plan of Care Reviewed With patient   Plan of Care Review   Progress no change   OTHER   Outcome Summary VSS, SR on monitor, RA, PRN pain medication for c/o pain. Left BKA scheduled tomorrow. Consents signed.        Problem: Infection, Risk/Actual (Adult)  Goal: Infection Prevention/Resolution  Outcome: Ongoing (interventions implemented as appropriate)   05/13/18 1811   Infection, Risk/Actual (Adult)   Infection Prevention/Resolution making progress toward outcome       Problem: Pain, Acute (Adult)  Goal: Identify Related Risk Factors and Signs and Symptoms  Outcome: Ongoing (interventions implemented as appropriate)   05/13/18 1811   Pain, Acute (Adult)   Related Risk Factors (Acute Pain) persistent pain;procedure/treatment;positioning   Signs and Symptoms (Acute Pain) guarding/abnormal posturing/positioning;verbalization of pain descriptors;BADLs/IADLs reluctance/inability to perform     Goal: Acceptable Pain Control/Comfort Level  Outcome: Ongoing (interventions implemented as appropriate)   05/13/18 1811   Pain, Acute (Adult)   Acceptable Pain Control/Comfort Level making progress toward outcome

## 2018-05-13 NOTE — PROGRESS NOTES
Sandoval Hernandez  1960  [id number]  5/13/2018    CC: Ischemic gangrene of left lower extremity    Sandoval Hernandez is a 58 y.o. male here for type 2 diabetes mellitus, severe peripheral arterial disease, recent angiography with stent deployment to left femoral artery/probable atheroembolic event.      Past medical history:  Past Medical History:   Diagnosis Date   • Diabetes mellitus    • Gout    • Hypertension        Medications:   Current Facility-Administered Medications:   •  allopurinol (ZYLOPRIM) tablet 300 mg, 300 mg, Oral, Daily, Marcel Fagan MD, 300 mg at 05/13/18 0938  •  amLODIPine (NORVASC) tablet 10 mg, 10 mg, Oral, Daily, Marcel Fagan MD, 10 mg at 05/13/18 0938  •  aspirin chewable tablet 324 mg, 324 mg, Oral, Daily, Marcel Fagan MD, 324 mg at 05/13/18 0938  •  atorvastatin (LIPITOR) tablet 80 mg, 80 mg, Oral, Nightly, Marcel Fagan MD, 80 mg at 05/12/18 2119  •  carvedilol (COREG) tablet 25 mg, 25 mg, Oral, BID With Meals, Marcel Fagan MD, 25 mg at 05/13/18 0938  •  CloNIDine (CATAPRES) tablet 0.1 mg, 0.1 mg, Oral, Q12H, Marcel Fagan MD, 0.1 mg at 05/13/18 0938  •  dextrose (D50W) solution 25 g, 25 g, Intravenous, Q15 Min PRN, Marcel Fagan MD  •  dextrose (GLUTOSE) oral gel 15 g, 15 g, Oral, Q15 Min PRN, Marcel Fagan MD  •  doxycycline (MONODOX) capsule 100 mg, 100 mg, Oral, Q12H, Dc Coates MD, 100 mg at 05/13/18 0937  •  glucagon (human recombinant) (GLUCAGEN DIAGNOSTIC) injection 1 mg, 1 mg, Subcutaneous, PRN, Marcel Fagan MD  •  heparin (porcine) 5000 UNIT/ML injection 5,000 Units, 5,000 Units, Subcutaneous, Q8H, Marcel Fagan MD, 5,000 Units at 05/13/18 0554  •  insulin lispro (humaLOG) injection 0-9 Units, 0-9 Units, Subcutaneous, 4x Daily With Meals & Nightly, Marcel Fagan MD, 2 Units at 05/12/18 1146  •  lisinopril (PRINIVIL,ZESTRIL) tablet 40 mg, 40 mg, Oral, Daily, Marcel Fagan MD, 40 mg at 05/13/18 0962  •  nicotine (NICODERM CQ) 21 MG/24HR patch 1 patch, 1  "patch, Transdermal, Daily, Marcel Fagan MD, 1 patch at 05/12/18 0830  •  oxyCODONE-acetaminophen (PERCOCET) 5-325 MG per tablet 1 tablet, 1 tablet, Oral, Q4H PRN, Marcel Fagan MD, 1 tablet at 05/13/18 0625  •  piperacillin-tazobactam (ZOSYN) 4.5 g in iso-osmotic dextrose 100 mL IVPB (premix), 4.5 g, Intravenous, Q8H, Marcel Fagan MD, 4.5 g at 05/13/18 0937  •  rOPINIRole (REQUIP) tablet 1 mg, 1 mg, Oral, Nightly, Marcel Fagan MD, 1 mg at 05/12/18 2119  •  sodium chloride 0.9 % flush 1-10 mL, 1-10 mL, Intravenous, PRN, Marcel Fagan MD  •  Tdap (BOOSTRIX) injection 0.5 mL, 0.5 mL, Intramuscular, During Hospitalization, Dc Coates MD  Antibiotics:  Anti-Infectives     Ordered     Dose/Rate Route Frequency Start Stop    05/11/18 1341  doxycycline (MONODOX) capsule 100 mg     Ordering Provider:  Dc Coates MD    100 mg Oral Every 12 Hours Scheduled 05/11/18 1800 05/18/18 2059    05/11/18 1026  piperacillin-tazobactam (ZOSYN) 4.5 g in iso-osmotic dextrose 100 mL IVPB (premix)     Ordering Provider:  Marcel Fagan MD    4.5 g  over 4 Hours Intravenous Every 8 Hours 05/11/18 1700 05/18/18 1659    05/11/18 1026  piperacillin-tazobactam (ZOSYN) 4.5 g in iso-osmotic dextrose 100 mL IVPB (premix)     Ordering Provider:  Marcel Fagan MD    4.5 g  over 30 Minutes Intravenous Once 05/11/18 1100 05/11/18 1254          Allergies:  has No Known Allergies.    Review of Systems: All other reviewed and negative except as per HPI    Blood pressure 132/61, pulse 77, temperature 98.5 °F (36.9 °C), temperature source Oral, resp. rate 16, height 162.6 cm (64.02\"), weight 84.4 kg (186 lb), SpO2 96 %.  GENERAL: Awake and alert, in no acute distress. Disheveled.  Severe periodontal disease.  Multiple missing and carious teeth.  HEENT: Oropharynx without thrush. Sinuses nontender. No cervical adenopathy. No carotid bruits/ jugular venous distention.   EYES: PERRL. No conjunctival injection. No icterus. " EOMI.  LYMPHATICS: No lymphadenopathy of the neck or axillary or inguinal regions.   HEART: No murmur, gallop, or pericardial friction rub.   LUNGS: Clear to auscultation anteriorly. No percussion dullness.   ABDOMEN: Soft, nontender, nondistended. No appreciable HSM.    SKIN: Warm and dry without cutaneous eruptions. No embolic stigmata.  Bullous cellulitis over the left dorsal foot, extending laterally.  Ischemic gangrene of multiple digits.  Blanching erythema and palpable warmth.  No malodorous changes.  No crepitant gas palpable in the soft tissues.  PSYCHIATRIC: Mental status lucid. Cranial nerve function intact.       DIAGNOSTICS:  Lab Results   Component Value Date    WBC 9.85 05/11/2018    HGB 11.1 (L) 05/11/2018    HCT 34.6 (L) 05/11/2018     05/11/2018     Lab Results   Component Value Date    CRP 17.26 (H) 05/11/2018     Lab Results   Component Value Date    SEDRATE 36 (H) 05/11/2018     Lab Results   Component Value Date    GLUCOSE 118 (H) 05/12/2018    BUN 18 05/12/2018    CREATININE 0.80 05/12/2018    EGFRIFNONA 99 05/12/2018    BCR 22.5 05/12/2018    CO2 25.0 05/12/2018    CALCIUM 9.3 05/12/2018    ALBUMIN 4.60 05/11/2018    LABIL2 1.3 (L) 05/11/2018    AST 68 (H) 05/11/2018    ALT 83 (H) 05/11/2018       Microbiology:Wound cultures negative.  Admission blood cultures ×2 negative.    RADIOLOGY:  Imaging Results (last 72 hours)     Procedure Component Value Units Date/Time    XR Toe 2+ View Left [[id number]] Collected:  05/11/18 2033     Updated:  05/11/18 2033    Narrative:          EXAMINATION: XR TOE 2+ VW LEFT - 05/11/2018     INDICATION:  [id number], not elsewhere classified.     COMPARISON: None.     FINDINGS: Status post amputations at the level of the base of the  proximal phalanx first and fourth digits with cortical margins fairly  well-preserved. No evidence for definitive osseous erosion or soft  tissue gas. Remaining non amputated digits grossly unremarkable with  preserved joint  "spaces. No acute fracture.           Impression:       Post amputation changes first and fourth digits without  evidence for osseous erosion or lucency to suggest osteolytic change. No  soft tissue emphysema. Further imaging may be useful if symptoms persist  and/or progress to fully evaluate for underlying osteomyelitis.     DICTATED:     05/11/2018  EDITED/ls :     05/11/2018              Assessment and Plan: Type 2 diabetes mellitus with poor glycemic control.  Severe peripheral arterial disease.  Previous digital amputations ×2 on left foot.  Recent angiography with femoral artery stent deployment.  Probable atheroembolic event.  Bullous cellulitis of the dorsal left foot.  Increased inflammatory markers.  Maximum temperature over 24 hours 98.6°.  Currently 98.3.  Plasma creatinine 0.8.  Hemoglobin A1c 7.3 g.  Serum lactate level 2.1.  C-reactive protein 17.3.  Hemodynamically stable.  Plans for left BKA tomorrow by Dr. Danie Carreno.  No changes in antimicrobial therapy.  Continue doxycycline and piperacillin tazobactam.  Utilization management: Essentially a \"removable focus of infection\".  Anticipate 48-72 hours of postoperative IV antimicrobials prior to transfer to [facility].      Dc Coates MD  5/13/2018    "

## 2018-05-14 ENCOUNTER — ANESTHESIA (OUTPATIENT)
Dept: PERIOP | Facility: HOSPITAL | Age: 58
End: 2018-05-14

## 2018-05-14 ENCOUNTER — ANESTHESIA EVENT (OUTPATIENT)
Dept: PERIOP | Facility: HOSPITAL | Age: 58
End: 2018-05-14

## 2018-05-14 LAB
GLUCOSE BLDC GLUCOMTR-MCNC: 115 MG/DL (ref 70–130)
GLUCOSE BLDC GLUCOMTR-MCNC: 116 MG/DL (ref 70–130)
GLUCOSE BLDC GLUCOMTR-MCNC: 117 MG/DL (ref 70–130)
GLUCOSE BLDC GLUCOMTR-MCNC: 132 MG/DL (ref 70–130)

## 2018-05-14 PROCEDURE — 99024 POSTOP FOLLOW-UP VISIT: CPT | Performed by: PHYSICIAN ASSISTANT

## 2018-05-14 PROCEDURE — 25010000002 FENTANYL CITRATE (PF) 100 MCG/2ML SOLUTION: Performed by: NURSE ANESTHETIST, CERTIFIED REGISTERED

## 2018-05-14 PROCEDURE — 25010000002 HYDROMORPHONE PER 4 MG: Performed by: ANESTHESIOLOGY

## 2018-05-14 PROCEDURE — 25010000002 BUPRENORPHINE PER 0.1 MG: Performed by: NURSE ANESTHETIST, CERTIFIED REGISTERED

## 2018-05-14 PROCEDURE — 0Y6J0Z3 DETACHMENT AT LEFT LOWER LEG, LOW, OPEN APPROACH: ICD-10-PCS | Performed by: THORACIC SURGERY (CARDIOTHORACIC VASCULAR SURGERY)

## 2018-05-14 PROCEDURE — 25010000002 PIPERACILLIN SOD-TAZOBACTAM PER 1 G: Performed by: INTERNAL MEDICINE

## 2018-05-14 PROCEDURE — 27880 AMPUTATION OF LOWER LEG: CPT | Performed by: THORACIC SURGERY (CARDIOTHORACIC VASCULAR SURGERY)

## 2018-05-14 PROCEDURE — 25010000002 MIDAZOLAM PER 1 MG: Performed by: NURSE ANESTHETIST, CERTIFIED REGISTERED

## 2018-05-14 PROCEDURE — 93010 ELECTROCARDIOGRAM REPORT: CPT | Performed by: INTERNAL MEDICINE

## 2018-05-14 PROCEDURE — 25010000002 CEFUROXIME: Performed by: PHYSICIAN ASSISTANT

## 2018-05-14 PROCEDURE — 25010000002 PROPOFOL 10 MG/ML EMULSION: Performed by: ANESTHESIOLOGY

## 2018-05-14 PROCEDURE — 25010000002 ROPIVACAINE PER 1 MG: Performed by: ANESTHESIOLOGY

## 2018-05-14 PROCEDURE — 88307 TISSUE EXAM BY PATHOLOGIST: CPT | Performed by: THORACIC SURGERY (CARDIOTHORACIC VASCULAR SURGERY)

## 2018-05-14 PROCEDURE — P9041 ALBUMIN (HUMAN),5%, 50ML: HCPCS | Performed by: ANESTHESIOLOGY

## 2018-05-14 PROCEDURE — 25010000002 PHENYLEPHRINE PER 1 ML: Performed by: ANESTHESIOLOGY

## 2018-05-14 PROCEDURE — 82962 GLUCOSE BLOOD TEST: CPT

## 2018-05-14 PROCEDURE — 25010000002 DEXAMETHASONE SODIUM PHOSPHATE 10 MG/ML SOLUTION 1 ML VIAL: Performed by: NURSE ANESTHETIST, CERTIFIED REGISTERED

## 2018-05-14 PROCEDURE — 25010000002 HEPARIN (PORCINE) PER 1000 UNITS: Performed by: INTERNAL MEDICINE

## 2018-05-14 PROCEDURE — 99232 SBSQ HOSP IP/OBS MODERATE 35: CPT | Performed by: NURSE PRACTITIONER

## 2018-05-14 PROCEDURE — 27880 AMPUTATION OF LOWER LEG: CPT | Performed by: PHYSICIAN ASSISTANT

## 2018-05-14 PROCEDURE — 25010000002 ALBUMIN HUMAN 5% PER 50 ML: Performed by: ANESTHESIOLOGY

## 2018-05-14 PROCEDURE — 93005 ELECTROCARDIOGRAM TRACING: CPT | Performed by: THORACIC SURGERY (CARDIOTHORACIC VASCULAR SURGERY)

## 2018-05-14 RX ORDER — SODIUM CHLORIDE 0.9 % (FLUSH) 0.9 %
1-10 SYRINGE (ML) INJECTION AS NEEDED
Status: DISCONTINUED | OUTPATIENT
Start: 2018-05-14 | End: 2018-05-14 | Stop reason: HOSPADM

## 2018-05-14 RX ORDER — SODIUM CHLORIDE, SODIUM LACTATE, POTASSIUM CHLORIDE, CALCIUM CHLORIDE 600; 310; 30; 20 MG/100ML; MG/100ML; MG/100ML; MG/100ML
9 INJECTION, SOLUTION INTRAVENOUS CONTINUOUS
Status: DISCONTINUED | OUTPATIENT
Start: 2018-05-14 | End: 2018-05-15

## 2018-05-14 RX ORDER — FAMOTIDINE 20 MG/1
20 TABLET, FILM COATED ORAL ONCE
Status: COMPLETED | OUTPATIENT
Start: 2018-05-14 | End: 2018-05-14

## 2018-05-14 RX ORDER — ONDANSETRON 4 MG/1
4 TABLET, FILM COATED ORAL EVERY 6 HOURS PRN
Status: DISCONTINUED | OUTPATIENT
Start: 2018-05-14 | End: 2018-05-18 | Stop reason: HOSPADM

## 2018-05-14 RX ORDER — ONDANSETRON 2 MG/ML
4 INJECTION INTRAMUSCULAR; INTRAVENOUS EVERY 6 HOURS PRN
Status: DISCONTINUED | OUTPATIENT
Start: 2018-05-14 | End: 2018-05-18 | Stop reason: HOSPADM

## 2018-05-14 RX ORDER — MIDAZOLAM HYDROCHLORIDE 1 MG/ML
INJECTION INTRAMUSCULAR; INTRAVENOUS AS NEEDED
Status: DISCONTINUED | OUTPATIENT
Start: 2018-05-14 | End: 2018-05-14 | Stop reason: SURG

## 2018-05-14 RX ORDER — MORPHINE SULFATE 4 MG/ML
2 INJECTION, SOLUTION INTRAMUSCULAR; INTRAVENOUS EVERY 4 HOURS PRN
Status: DISCONTINUED | OUTPATIENT
Start: 2018-05-14 | End: 2018-05-18 | Stop reason: HOSPADM

## 2018-05-14 RX ORDER — ROPIVACAINE HYDROCHLORIDE 2 MG/ML
12 INJECTION, SOLUTION EPIDURAL; INFILTRATION CONTINUOUS
Status: DISCONTINUED | OUTPATIENT
Start: 2018-05-14 | End: 2018-05-16

## 2018-05-14 RX ORDER — PROPOFOL 10 MG/ML
VIAL (ML) INTRAVENOUS AS NEEDED
Status: DISCONTINUED | OUTPATIENT
Start: 2018-05-14 | End: 2018-05-14 | Stop reason: SURG

## 2018-05-14 RX ORDER — FENTANYL CITRATE 50 UG/ML
50 INJECTION, SOLUTION INTRAMUSCULAR; INTRAVENOUS
Status: DISCONTINUED | OUTPATIENT
Start: 2018-05-14 | End: 2018-05-14 | Stop reason: HOSPADM

## 2018-05-14 RX ORDER — BISACODYL 10 MG
10 SUPPOSITORY, RECTAL RECTAL DAILY PRN
Status: DISCONTINUED | OUTPATIENT
Start: 2018-05-14 | End: 2018-05-18 | Stop reason: HOSPADM

## 2018-05-14 RX ORDER — SODIUM CHLORIDE 450 MG/100ML
35 INJECTION, SOLUTION INTRAVENOUS CONTINUOUS
Status: DISCONTINUED | OUTPATIENT
Start: 2018-05-14 | End: 2018-05-18 | Stop reason: HOSPADM

## 2018-05-14 RX ORDER — HYDROMORPHONE HCL 110MG/55ML
PATIENT CONTROLLED ANALGESIA SYRINGE INTRAVENOUS AS NEEDED
Status: DISCONTINUED | OUTPATIENT
Start: 2018-05-14 | End: 2018-05-14 | Stop reason: SURG

## 2018-05-14 RX ORDER — FENTANYL CITRATE 50 UG/ML
INJECTION, SOLUTION INTRAMUSCULAR; INTRAVENOUS AS NEEDED
Status: DISCONTINUED | OUTPATIENT
Start: 2018-05-14 | End: 2018-05-14 | Stop reason: SURG

## 2018-05-14 RX ORDER — HYDROMORPHONE HYDROCHLORIDE 1 MG/ML
0.5 INJECTION, SOLUTION INTRAMUSCULAR; INTRAVENOUS; SUBCUTANEOUS
Status: DISCONTINUED | OUTPATIENT
Start: 2018-05-14 | End: 2018-05-14 | Stop reason: HOSPADM

## 2018-05-14 RX ORDER — LIDOCAINE HYDROCHLORIDE 10 MG/ML
0.5 INJECTION, SOLUTION EPIDURAL; INFILTRATION; INTRACAUDAL; PERINEURAL ONCE AS NEEDED
Status: CANCELLED | OUTPATIENT
Start: 2018-05-14

## 2018-05-14 RX ORDER — ALBUMIN, HUMAN INJ 5% 5 %
SOLUTION INTRAVENOUS CONTINUOUS PRN
Status: DISCONTINUED | OUTPATIENT
Start: 2018-05-14 | End: 2018-05-14 | Stop reason: SURG

## 2018-05-14 RX ORDER — SENNA AND DOCUSATE SODIUM 50; 8.6 MG/1; MG/1
2 TABLET, FILM COATED ORAL 2 TIMES DAILY PRN
Status: DISCONTINUED | OUTPATIENT
Start: 2018-05-14 | End: 2018-05-18 | Stop reason: HOSPADM

## 2018-05-14 RX ORDER — HEPARIN SODIUM 5000 [USP'U]/ML
5000 INJECTION, SOLUTION INTRAVENOUS; SUBCUTANEOUS EVERY 12 HOURS SCHEDULED
Status: DISCONTINUED | OUTPATIENT
Start: 2018-05-15 | End: 2018-05-18 | Stop reason: HOSPADM

## 2018-05-14 RX ORDER — BUPIVACAINE HYDROCHLORIDE 2.5 MG/ML
INJECTION, SOLUTION EPIDURAL; INFILTRATION; INTRACAUDAL AS NEEDED
Status: DISCONTINUED | OUTPATIENT
Start: 2018-05-14 | End: 2018-05-14 | Stop reason: SURG

## 2018-05-14 RX ADMIN — TAZOBACTAM SODIUM AND PIPERACILLIN SODIUM 4.5 G: 500; 4 INJECTION, SOLUTION INTRAVENOUS at 01:55

## 2018-05-14 RX ADMIN — DEXAMETHASONE SODIUM PHOSPHATE 21.2 ML: 10 INJECTION, SOLUTION INTRAMUSCULAR; INTRAVENOUS at 14:20

## 2018-05-14 RX ADMIN — HEPARIN SODIUM 5000 UNITS: 5000 INJECTION, SOLUTION INTRAVENOUS; SUBCUTANEOUS at 06:40

## 2018-05-14 RX ADMIN — PROPOFOL 200 MG: 10 INJECTION, EMULSION INTRAVENOUS at 15:36

## 2018-05-14 RX ADMIN — ATORVASTATIN CALCIUM 80 MG: 40 TABLET, FILM COATED ORAL at 20:53

## 2018-05-14 RX ADMIN — LISINOPRIL 40 MG: 20 TABLET ORAL at 08:41

## 2018-05-14 RX ADMIN — FENTANYL CITRATE 100 MCG: 50 INJECTION, SOLUTION INTRAMUSCULAR; INTRAVENOUS at 14:15

## 2018-05-14 RX ADMIN — HYDROMORPHONE HYDROCHLORIDE 1 MG: 2 INJECTION, SOLUTION INTRAMUSCULAR; INTRAVENOUS; SUBCUTANEOUS at 16:15

## 2018-05-14 RX ADMIN — SODIUM CHLORIDE 35 ML/HR: 4.5 INJECTION, SOLUTION INTRAVENOUS at 18:55

## 2018-05-14 RX ADMIN — PHENYLEPHRINE HYDROCHLORIDE 100 MCG: 10 INJECTION INTRAVENOUS at 15:54

## 2018-05-14 RX ADMIN — CEFUROXIME 1.5 G: 1.5 INJECTION, POWDER, FOR SOLUTION INTRAVENOUS at 15:32

## 2018-05-14 RX ADMIN — CARVEDILOL 25 MG: 12.5 TABLET, FILM COATED ORAL at 08:41

## 2018-05-14 RX ADMIN — CLONIDINE HYDROCHLORIDE 0.1 MG: 0.1 TABLET ORAL at 20:53

## 2018-05-14 RX ADMIN — AMLODIPINE BESYLATE 10 MG: 10 TABLET ORAL at 08:41

## 2018-05-14 RX ADMIN — PHENYLEPHRINE HYDROCHLORIDE 100 MCG: 10 INJECTION INTRAVENOUS at 16:48

## 2018-05-14 RX ADMIN — ALBUMIN HUMAN: 0.05 INJECTION, SOLUTION INTRAVENOUS at 16:47

## 2018-05-14 RX ADMIN — ASPIRIN 81 MG 324 MG: 81 TABLET ORAL at 08:41

## 2018-05-14 RX ADMIN — BUPIVACAINE HYDROCHLORIDE 30 ML: 2.5 INJECTION, SOLUTION EPIDURAL; INFILTRATION; INTRACAUDAL; PERINEURAL at 14:15

## 2018-05-14 RX ADMIN — FAMOTIDINE 20 MG: 20 TABLET ORAL at 14:17

## 2018-05-14 RX ADMIN — ROPINIROLE 1 MG: 0.5 TABLET, FILM COATED ORAL at 20:53

## 2018-05-14 RX ADMIN — CLONIDINE HYDROCHLORIDE 0.1 MG: 0.1 TABLET ORAL at 08:41

## 2018-05-14 RX ADMIN — ROPIVACAINE HYDROCHLORIDE 6 ML/HR: 2 INJECTION, SOLUTION EPIDURAL; INFILTRATION at 17:50

## 2018-05-14 RX ADMIN — OXYCODONE HYDROCHLORIDE AND ACETAMINOPHEN 1 TABLET: 5; 325 TABLET ORAL at 10:57

## 2018-05-14 RX ADMIN — SODIUM CHLORIDE, POTASSIUM CHLORIDE, SODIUM LACTATE AND CALCIUM CHLORIDE 9 ML/HR: 600; 310; 30; 20 INJECTION, SOLUTION INTRAVENOUS at 14:17

## 2018-05-14 RX ADMIN — PHENYLEPHRINE HYDROCHLORIDE 100 MCG: 10 INJECTION INTRAVENOUS at 16:31

## 2018-05-14 RX ADMIN — DOXYCYCLINE 100 MG: 100 CAPSULE ORAL at 20:53

## 2018-05-14 RX ADMIN — DOXYCYCLINE 100 MG: 100 CAPSULE ORAL at 08:44

## 2018-05-14 RX ADMIN — ALLOPURINOL 300 MG: 300 TABLET ORAL at 08:44

## 2018-05-14 RX ADMIN — TAZOBACTAM SODIUM AND PIPERACILLIN SODIUM 4.5 G: 500; 4 INJECTION, SOLUTION INTRAVENOUS at 08:40

## 2018-05-14 RX ADMIN — FENTANYL CITRATE 50 MCG: 50 INJECTION, SOLUTION INTRAMUSCULAR; INTRAVENOUS at 16:00

## 2018-05-14 RX ADMIN — MIDAZOLAM HYDROCHLORIDE 2 MG: 1 INJECTION, SOLUTION INTRAMUSCULAR; INTRAVENOUS at 14:15

## 2018-05-14 RX ADMIN — FENTANYL CITRATE 50 MCG: 50 INJECTION, SOLUTION INTRAMUSCULAR; INTRAVENOUS at 16:04

## 2018-05-14 NOTE — ANESTHESIA PREPROCEDURE EVALUATION
Anesthesia Evaluation     Patient summary reviewed and Nursing notes reviewed   NPO Solid Status: > 8 hours  NPO Liquid Status: > 8 hours           Airway   Dental      Pulmonary    Cardiovascular         Neuro/Psych  GI/Hepatic/Renal/Endo      Musculoskeletal     Abdominal    Substance History      OB/GYN          Other                        Anesthesia Plan

## 2018-05-14 NOTE — PROGRESS NOTES
Continued Stay Note  [facility]     Patient Name: Sandoval Hernandez  MRN: [id number]  Today's Date: 5/14/2018    Admit Date: 5/11/2018          Discharge Plan     Row Name 05/14/18 1223       Plan    Plan TBD    Plan Comments Patient scheduled for BKA today at 1400. CM anticipates discharge to inpatient rehab, either skilled or acute depending on what his insurance will approve. He will need PT and OT evals after surgery in order to make referrals. CM will f/u with patient after surgery to get the names of facilities he would consider for rehab. CM anticipates he will need ambulance transportation as well. CM will continue to follow.               Discharge Codes    No documentation.       Expected Discharge Date and Time     Expected Discharge Date Expected Discharge Time    May 17, 2018             Izabel Abraham

## 2018-05-14 NOTE — PLAN OF CARE
Problem: Fall Risk (Adult)  Goal: Absence of Fall  Outcome: Ongoing (interventions implemented as appropriate)      Problem: Patient Care Overview  Goal: Plan of Care Review  Outcome: Ongoing (interventions implemented as appropriate)    Goal: Individualization and Mutuality  Outcome: Ongoing (interventions implemented as appropriate)    Goal: Discharge Needs Assessment  Outcome: Ongoing (interventions implemented as appropriate)    Goal: Interprofessional Rounds/Family Conf  Outcome: Ongoing (interventions implemented as appropriate)      Problem: Infection, Risk/Actual (Adult)  Goal: Infection Prevention/Resolution  Outcome: Ongoing (interventions implemented as appropriate)      Problem: Pain, Acute (Adult)  Goal: Identify Related Risk Factors and Signs and Symptoms  Outcome: Ongoing (interventions implemented as appropriate)    Goal: Acceptable Pain Control/Comfort Level  Outcome: Ongoing (interventions implemented as appropriate)

## 2018-05-14 NOTE — PROGRESS NOTES
[facility] Medicine Services  PROGRESS NOTE    Patient Name: Sandoval Hernandez  : 1960  MRN: [id number]    Date of Admission: 2018  Length of Stay: 3  Primary Care Physician: ALETA Plummer    Subjective   Subjective     CC:  Foot gangrene    HPI:  Patient resting in bed in NAD. Pt is agreeable to surgery today. Pain controlled. Denies any soa or cp. No acute events overnight per nursing.     Review of Systems   Constitutional: Negative for appetite change and fever.   Respiratory: Negative for shortness of breath.    Cardiovascular: Negative for chest pain.   Gastrointestinal: Negative for abdominal pain.   Genitourinary: Negative for dysuria.   Musculoskeletal:        + foot pain   All other systems reviewed and are negative.        Otherwise ROS is negative except as mentioned in the HPI.    Objective   Objective     Vital Signs:   Temp:  [98.1 °F (36.7 °C)-98.7 °F (37.1 °C)] 98.5 °F (36.9 °C)  Heart Rate:  [62-80] 67  Resp:  [17-20] 20  BP: (110-160)/(59-79) 129/63        Physical Exam:  Constitutional: No acute distress, awake, alert  HENT: NCAT, mucous membranes moist, poor dentition  Respiratory: Clear to auscultation bilaterally, respiratory effort normal   Cardiovascular: RRR, no murmurs, rubs, or gallops  Gastrointestinal: Positive bowel sounds, soft, nontender, nondistended  Musculoskeletal: left foot heavily wrapped, did not examine  Psychiatric: Appropriate affect, cooperative  Neurologic: Oriented x 3, strength symmetric in all extremities, Cranial Nerves grossly intact to confrontation, speech clear  Skin: No rashes      Results Reviewed:  I have personally reviewed current lab, radiology, and data and agree.      Results from last 7 days  Lab Units 18  0116   WBC 10*3/mm3 9.85   HEMOGLOBIN g/dL 11.1*   HEMATOCRIT % 34.6*   PLATELETS 10*3/mm3 275       Results from last 7 days  Lab Units 18  0536 18  0116   SODIUM mmol/L 141 138   POTASSIUM  mmol/L 4.3 3.6   CHLORIDE mmol/L 107 108   CO2 mmol/L 25.0 20.3*   BUN mg/dL 18 34*   CREATININE mg/dL 0.80 1.23   GLUCOSE mg/dL 118* 199*   CALCIUM mg/dL 9.3 10.8*   ALT (SGPT) U/L  --  83*   AST (SGOT) U/L  --  68*     Estimated Creatinine Clearance: 98.7 mL/min (by C-G formula based on SCr of 0.8 mg/dL).  No results found for: BNP  No results found for: PHART    Microbiology Results Abnormal     Procedure Component Value - Date/Time    Wound Culture - Wound, Foot [[id number]]  (Normal) Collected:  05/11/18 1111    Lab Status:  Final result Specimen:  Wound from Foot Updated:  05/13/18 1055     Wound Culture No growth at 2 days     Gram Stain Result No WBCs or organisms seen          Imaging Results (last 24 hours)     Procedure Component Value Units Date/Time    XR Toe 2+ View Left [[id number]] Collected:  05/11/18 2033     Updated:  05/13/18 1432    Narrative:          EXAMINATION: XR TOE 2+ VW LEFT - 05/11/2018     INDICATION:  [id number], not elsewhere classified.     COMPARISON: None.     FINDINGS: Status post amputations at the level of the base of the  proximal phalanx first and fourth digits with cortical margins fairly  well-preserved. No evidence for definitive osseous erosion or soft  tissue gas. Remaining non amputated digits grossly unremarkable with  preserved joint spaces. No acute fracture.           Impression:       Post amputation changes first and fourth digits without  evidence for osseous erosion or lucency to suggest osteolytic change. No  soft tissue emphysema. Further imaging may be useful if symptoms persist  and/or progress to fully evaluate for underlying osteomyelitis.     DICTATED:     05/11/2018  EDITED/ls :     05/11/2018      This report was finalized on 5/13/2018 2:30 PM by Dr. Italo Philip.                I have reviewed the medications.    Assessment/Plan   Assessment / Plan     Hospital Problem List     * (Principal)Gangrene of left foot    Cellulitis of left lower extremity     PAD (peripheral artery disease)    Diabetes mellitus, type 2    Tobacco abuse    Renal insufficiency    Hypertension             Brief Hospital Course to date:  Sandoval Hernandez is a 58 y.o. male with a history of type 2 diabetes, ongoing tobacco abuse, renal specialist and see, peripheral arterial disease who presented to [facility] with a gangrenous left foot.  Records from [facility] indicate he was just discharged later on May 8 and during that stay had a stenting of his left common iliac artery and left superior femoral artery.  He also required repeat intervention to the left superficial femoral artery stent due to fracture.  After discharge with continued pain and gangrene and is transferred to [place] for further evaluation.    Assessment & Plan:  - outside records reviewed with results as above  - appreciate Dr. Jaxon adan, continue zosyn and doxy  - d/w Dr. Carreno, anticipate left BKA today at 2 pm.  Patient is agreeable and wishing to proceed.  - continue ASA and will resume plavix post-op  - continue increased statin dose.  - a1c 7.3% , currently acceptable glc on current insulin.  - continue nicotine patch, counseled regarding need for cessation  - Cr down to 0.8 from 1.2 in [place]  - will likely need rehab after his BKA, will d/w CM    DVT Prophylaxis:  heparin    CODE STATUS: Full Code    Disposition: I expect the patient to be discharged TBD    Electronically signed by ALETA Devries, 05/14/18, 9:55 AM.

## 2018-05-14 NOTE — OP NOTE
Procedure Note    Patient Name:  Sandoval Hernandez    Date of Surgery: 05/14/2018      Pre-op Diagnosis: Ischemic gangrene left secondary to severe peripheral vascular disease    Post-op Diagnosis:   Same    Surgeon(s):  Danie Carreno MD    Assistant(s) Yuniel Christina PA-C:    Anesthesia: Regional popliteal nerve block with intravenous induction of general LMA anesthesia    [facility]: [facility] of the left lower extremity involving the left foot.  Recently the patient had endovascular intervention of the left leg to include left iliac stenting and apparently thinning of the  superficial femoral artery.  Following his procedures he has developed ischemic gangrene of the left lower extremity and on transfer to our facility it was a nonsalvageable situation of the left foot.  We discussed the need for left below knee amputation with the patient: the risk of bleeding and infection, stroke, heart attack and death as well as possible postop phantom pain were all discussed with the patient and he understood and agreed to proceed ahead with left below-knee amputation.    Procedure(s): Left below-knee amputation    The patient had placement of a popliteal nerve catheter in the left lower extremity per Dr. Brooks of anesthesia and then was taken the room and underwent intravenous induction of general anesthesia with LMA.  Timeout was called to verify the procedure to be performed, that being a left below-knee amputation.  After verification the patient was prepped from the toes to the upper thigh of the left leg with Hibiclens solution and was sterilely draped.  A anterior skin flap was made in the left lower extremity approximately 4 fingerbreadths below the tibial tuberosity extended medially and laterally in to the calf to the midline.  The incisions were then directed inferiorly on both the medial and lateral aspects of the calf in the midline down to the ankle where  posteriorly these 2 incisions were met.  The anterior  flap was developed by dividing the anterior compartment muscles down to the anterior tibial vessels which were then ligated with 2-0 silk suture proximal and distally.  A transfixion stitch of 2.0 silk was also used to further secure this hemostasis.  Laterally in the calf the the fibula was identified and the periosteum was elevated off the fibula approximately 2 cm above the anterior skin flap incision and was divided with the double-action bone cutter.  The tibia was then divided with the bone power saw at the level of the skin incision of the anterior flap.  The posterior flap was completed by using amputation knife under the fibula and the tibia and dividing the musculature of the posterior flap down to the ankle level where the skin incisions were then met posteriorly with amputation knife and the specimen was removed from the operative field.  The posterior tibial vessels and the peroneal tibial vessels were identified and were ligated with 2-0 silk suture and a transfixion stitch of 2-0 silk.  The soleus was then dissected free from the gastrocnemius fascia with blunt and sharp dissection and the soleus was then divided at the anterior skin flap level with the Bovie cautery and removed from the operative field.  After hemostasis was controlled with Bovie cautery the entire open stump was then irrigated with 3 L of orthopedic antibiotic solution with a Pulsavac fashion.  The posterior skin flap was then tailored to the proper length along with the gastrocnemius fascia and the gastrocnemius fascia was then sutured to the periosteum of the tibia after the tibia had been beveled on its anterior portion with the bone power saw and rasp to smooth the edges.  The gastrocnemius fascia was sutured to the  anterior tibial periosteum with interrupted 2-0 Vicryl sutures.  Laterally and medially the gastrocnemius fascia was sutured to the muscle fascia of the  anterior compartment with interrupted 2-0 Vicryl sutures.  A 15 Gabonese round Santi-Freitas drain was placed under the gastrocnemius fascia prior to approximating the posterior flap to the anterior flap.  At the exit at the skin laterally the drain was anchored with a 2-0 silk and was attached to a bulb vacuum reservoir..  The subcutaneous layer of the posterior flap was sutured to the subcutaneous tissue of anterior flap with interrupted 3-0 Vicryl sutures and the skin was reapproximated with interrupted 4-0 nylon sutures.  The incision was covered with Adaptic gauze followed by 4 x 4 fluffs and Kerlix wraps and #8 Spandage tube net dressing to hold the dressing in place.  The bulb vacuum reservoir was placed in a small pouch and attached to the Spandage dressing with the Velcro attachment.  The patient was awakened and the LMA was removed and the patient was taken to the recovery room with stable vital signs.  Blood loss was approximately 200 mL's, no complications occurred, and sponge and needle count were correct ×2.    Estimated Blood Loss 200 mL's    Findings: The musculature of the posterior and anterior flap had good bleeding and there was no evidence of any subcutaneous infection noted or edema within the musculature.  The skin edges of the incision are viable    Complications: No immediate complications occurred      Danie Carreno MD     Date: 5/14/2018 Time: 5:51 PM

## 2018-05-14 NOTE — ANESTHESIA PROCEDURE NOTES
Adductor canal block    Patient location during procedure: pre-op  Reason for block: at surgeon's request and post-op pain management  Performed by  Anesthesiologist: SELAM JOHNSON  Preanesthetic Checklist  Completed: patient identified, site marked, surgical consent, pre-op evaluation, timeout performed, IV checked, risks and benefits discussed and monitors and equipment checked  Prep:  Pt Position: supine  Sterile barriers:cap, gloves, mask and sterile barriers  Prep: ChloraPrep  Patient monitoring: blood pressure monitoring, continuous pulse oximetry and EKG  Procedure  Sedation:yes  Performed under: local infiltration  Guidance:ultrasound guided  ULTRASOUND INTERPRETATION. Using ultrasound guidance a gauge needle was placed in close proximity to the femoral nerve, at which point, under ultrasound guidance anesthetic was injected in the area of the nerve and spread of the anesthesia was seen on ultrasound in close proximity thereto.  There were no abnormalities seen on ultrasound; a digital image was taken; and the patient tolerated the procedure with no complications. Images:still images obtained    Laterality:left  Block Type:adductor canal block  Injection Technique:catheter  Needle Type:Tuohy and echogenic  Needle Gauge:18 G    Catheter Size:20 G (20g)  Medications  Comment:0.3 mg Buprenex, 2 mg decadron  Local Injected:bupivacaine 0.25% Local Amount Injected:20 (ml)mL  Post Assessment  Injection Assessment: negative aspiration for heme, incremental injection and no paresthesia on injection  Patient Tolerance:comfortable throughout block  Complications:no  Additional Notes  Procedure:             The pt was placed in the Supine position.  The Insertion site was  prepped and Draped in sterile fashion.  The pt was anesthetized with  IV Sedation( see meds).  Skin and cutaneous tissue was infiltrated and anesthetized with 1% Lidocaine 3 mls via a 25g needle.  A BBraun 4 inch 18g echogenic needle was then   inserted approximately midline, mid-thigh and advanced In-plane with Ultrasound guidance.  Normal Saline PSF was utilized for hydrodissection of tissue.  The Vastus medialis and Sartorius muscle where visualized and the needle tip was placed in the adductor canal,  lateral to the femoral artery.  LA injection spread was visualized, injection was incremental 1-5ml, injection pressure was normal or little, no intraneural injection, no vascular injection.  LA dose was injected thru the needle(see dose above).   Thank you.

## 2018-05-14 NOTE — ANESTHESIA PREPROCEDURE EVALUATION
Anesthesia Evaluation     Patient summary reviewed and Nursing notes reviewed                Airway   Mallampati: I  TM distance: >3 FB  Neck ROM: full  no difficulty expected  Dental - normal exam     Pulmonary - normal exam   (+) a smoker Current,   Cardiovascular - normal exam    (+) hypertension, PVD,       Neuro/Psych- negative ROS  GI/Hepatic/Renal/Endo    (+)   diabetes mellitus,     Musculoskeletal         ROS comment: LEFT FOOT GANGRENE  Abdominal  - normal exam    Bowel sounds: normal.   Substance History - negative use     OB/GYN negative ob/gyn ROS         Other                        Anesthesia Plan    ASA 3     general   (POPLITEAL CATHETER, ADDUCTOR SINGLE SHOT FOR POST OP PAIN)  intravenous induction   Anesthetic plan and risks discussed with patient.    Plan discussed with CRNA.

## 2018-05-14 NOTE — PROGRESS NOTES
Sandoval Hernandez  1960  [id number]  5/14/2018    CC: Wet gangrene of left lower extremity    Sandoval Hernandez is a 58 y.o. male here for type 2 diabetes mellitus, severe peripheral arterial disease, bullous cellulitis, and digital ischemia of left lower extremity..      Past medical history:  Past Medical History:   Diagnosis Date   • Diabetes mellitus    • Gout    • Hypertension        Medications:   Current Facility-Administered Medications:   •  allopurinol (ZYLOPRIM) tablet 300 mg, 300 mg, Oral, Daily, Marcel Fagan MD, 300 mg at 05/14/18 0844  •  amLODIPine (NORVASC) tablet 10 mg, 10 mg, Oral, Daily, Marcel Fagan MD, 10 mg at 05/14/18 0841  •  aspirin chewable tablet 324 mg, 324 mg, Oral, Daily, Marcel Fagan MD, 324 mg at 05/14/18 0841  •  atorvastatin (LIPITOR) tablet 80 mg, 80 mg, Oral, Nightly, Marcel Fagan MD, 80 mg at 05/13/18 2122  •  carvedilol (COREG) tablet 25 mg, 25 mg, Oral, BID With Meals, Marcel Fagan MD, 25 mg at 05/14/18 0841  •  CloNIDine (CATAPRES) tablet 0.1 mg, 0.1 mg, Oral, Q12H, Marcel Fagan MD, 0.1 mg at 05/14/18 0841  •  dextrose (D50W) solution 25 g, 25 g, Intravenous, Q15 Min PRN, Marcel Fagan MD  •  dextrose (GLUTOSE) oral gel 15 g, 15 g, Oral, Q15 Min PRN, Marcel Fagan MD  •  doxycycline (MONODOX) capsule 100 mg, 100 mg, Oral, Q12H, Dc Coates MD, 100 mg at 05/14/18 0844  •  glucagon (human recombinant) (GLUCAGEN DIAGNOSTIC) injection 1 mg, 1 mg, Subcutaneous, PRN, Marcel Fagan MD  •  heparin (porcine) 5000 UNIT/ML injection 5,000 Units, 5,000 Units, Subcutaneous, Q8H, Marcel Fagan MD, 5,000 Units at 05/14/18 0640  •  insulin lispro (humaLOG) injection 0-9 Units, 0-9 Units, Subcutaneous, 4x Daily With Meals & Nightly, Marcel Fagan MD, 2 Units at 05/12/18 1146  •  lisinopril (PRINIVIL,ZESTRIL) tablet 40 mg, 40 mg, Oral, Daily, Marcel Fagan MD, 40 mg at 05/14/18 0841  •  nicotine (NICODERM CQ) 21 MG/24HR patch 1 patch, 1 patch, Transdermal, Daily, Marcel AVILEZ  "MD Rylan, 1 patch at 05/12/18 0830  •  oxyCODONE-acetaminophen (PERCOCET) 5-325 MG per tablet 1 tablet, 1 tablet, Oral, Q4H PRN, Marcel Fagan MD, 1 tablet at 05/13/18 2122  •  piperacillin-tazobactam (ZOSYN) 4.5 g in iso-osmotic dextrose 100 mL IVPB (premix), 4.5 g, Intravenous, Q8H, Marcel Fagan MD, 4.5 g at 05/14/18 0840  •  rOPINIRole (REQUIP) tablet 1 mg, 1 mg, Oral, Nightly, Marcel Fagan MD, 1 mg at 05/13/18 2122  •  sodium chloride 0.9 % flush 1-10 mL, 1-10 mL, Intravenous, PRN, Marcel Fagan MD  •  Tdap (BOOSTRIX) injection 0.5 mL, 0.5 mL, Intramuscular, During Hospitalization, Dc Coates MD  Antibiotics:  Anti-Infectives     Ordered     Dose/Rate Route Frequency Start Stop    05/11/18 1341  doxycycline (MONODOX) capsule 100 mg     Ordering Provider:  Dc Coates MD    100 mg Oral Every 12 Hours Scheduled 05/11/18 1800 05/18/18 2059    05/11/18 1026  piperacillin-tazobactam (ZOSYN) 4.5 g in iso-osmotic dextrose 100 mL IVPB (premix)     Ordering Provider:  Marcel Fagan MD    4.5 g  over 4 Hours Intravenous Every 8 Hours 05/11/18 1700 05/18/18 1659    05/11/18 1026  piperacillin-tazobactam (ZOSYN) 4.5 g in iso-osmotic dextrose 100 mL IVPB (premix)     Ordering Provider:  Marcel Fagan MD    4.5 g  over 30 Minutes Intravenous Once 05/11/18 1100 05/11/18 1254          Allergies:  has No Known Allergies.    Review of Systems: All other reviewed and negative except as per HPI    Blood pressure 129/63, pulse 67, temperature 98.5 °F (36.9 °C), temperature source Oral, resp. rate 20, height 162.6 cm (64.02\"), weight 84.4 kg (186 lb), SpO2 100 %.  GENERAL: Awake and alert, in no acute distress. Sitting on the side of the bed.  Minimal pain.  Denies fever chills or night sweats.  Anticipates left BKA at 2:00 this afternoon.  HEENT: Oropharynx without thrush. Sinuses nontender. Dentition in poor repair. No cervical adenopathy. No carotid bruits/ jugular venous distention.   EYES: PERRL. No " conjunctival injection. No icterus. EOMI.  LYMPHATICS: No lymphadenopathy of the neck or axillary or inguinal regions.   HEART: No murmur, gallop, or pericardial friction rub.   LUNGS: Clear to auscultation anteriorly. No percussion dullness.   ABDOMEN: Soft, nontender, nondistended. No appreciable HSM.    SKIN: Warm and dry without cutaneous eruptions. No embolic stigmata.  Bullous cellulitis overlying left dorsal foot with digital gangrene.  No crepitant gas in the soft tissues.  No malodorous changes.  PSYCHIATRIC: Mental status lucid. Cranial nerve function intact.       DIAGNOSTICS:  Lab Results   Component Value Date    WBC 9.85 05/11/2018    HGB 11.1 (L) 05/11/2018    HCT 34.6 (L) 05/11/2018     05/11/2018     Lab Results   Component Value Date    CRP 17.26 (H) 05/11/2018     Lab Results   Component Value Date    SEDRATE 36 (H) 05/11/2018     Lab Results   Component Value Date    GLUCOSE 118 (H) 05/12/2018    BUN 18 05/12/2018    CREATININE 0.80 05/12/2018    EGFRIFNONA 99 05/12/2018    BCR 22.5 05/12/2018    CO2 25.0 05/12/2018    CALCIUM 9.3 05/12/2018    ALBUMIN 4.60 05/11/2018    LABIL2 1.3 (L) 05/11/2018    AST 68 (H) 05/11/2018    ALT 83 (H) 05/11/2018       Microbiology:Blood cultures ×2 negative.  Wound culture unremarkable.    RADIOLOGY:  Imaging Results (last 72 hours)     Procedure Component Value Units Date/Time    XR Toe 2+ View Left [[id number]] Collected:  05/11/18 2033     Updated:  05/13/18 1432    Narrative:          EXAMINATION: XR TOE 2+ VW LEFT - 05/11/2018     INDICATION:  [id number], not elsewhere classified.     COMPARISON: None.     FINDINGS: Status post amputations at the level of the base of the  proximal phalanx first and fourth digits with cortical margins fairly  well-preserved. No evidence for definitive osseous erosion or soft  tissue gas. Remaining non amputated digits grossly unremarkable with  preserved joint spaces. No acute fracture.           Impression:        Post amputation changes first and fourth digits without  evidence for osseous erosion or lucency to suggest osteolytic change. No  soft tissue emphysema. Further imaging may be useful if symptoms persist  and/or progress to fully evaluate for underlying osteomyelitis.     DICTATED:     05/11/2018  EDITED/ls :     05/11/2018      This report was finalized on 5/13/2018 2:30 PM by Dr. Italo Philip.             Assessment and Plan: Long-standing diabetes mellitus.  Suboptimal glycemic control.  Peripheral arterial disease.  Recent left femoral stent deployment/suspected atheroembolic event.  Wet gangrene of left foot.  BKA amputation planned for today.  Maximum temperature over 24 hours 98.7°.  Currently 98.1°.  Plasma creatinine 0.8.  Lactic acidosis resolved with current level of 1.1.  C-reactive protein is 17.  Blood and wound cultures remain negative.  Pulsation management: Currently on doxycycline and piperacillin tazobactam.  Anticipate 48-72 hours of postoperative antimicrobials followed by transfer to [facility].      Dc Coates MD  5/14/2018

## 2018-05-14 NOTE — ANESTHESIA PROCEDURE NOTES
Pop catheter    Patient location during procedure: pre-op  Start time: 5/14/2018 2:15 PM  Reason for block: at surgeon's request and post-op pain management  Performed by  Anesthesiologist: SELAM JOHNSON  Preanesthetic Checklist  Completed: patient identified, site marked, surgical consent, pre-op evaluation, timeout performed, IV checked, risks and benefits discussed and monitors and equipment checked  Prep:  Pt Position: supine  Sterile barriers:cap, gloves, mask and sterile barriers  Prep: ChloraPrep  Patient monitoring: blood pressure monitoring, continuous pulse oximetry and EKG  Procedure  Sedation:yes  Performed under: local infiltration  Guidance:ultrasound guided  ULTRASOUND INTERPRETATION. Using ultrasound guidance a gauge needle was placed in close proximity to the sciatic nerve, at which point, under ultrasound guidance anesthetic was injected in the area of the nerve and spread of the anesthesia was seen on ultrasound in close proximity thereto.  There were no abnormalities seen on ultrasound; a digital image was taken; and the patient tolerated the procedure with no complications. Images:still images obtained    Laterality:left  Block Type:popliteal  Injection Technique:catheter  Needle Type:echogenic  Needle Gauge:18 G    Catheter Size:20 G  Cath Depth at skin: 7 cm  Medications  Local Injected:bupivacaine 0.25% Local Amount Injected:30 (mls)mL  Post Assessment  Injection Assessment: negative aspiration for heme, no paresthesia on injection and incremental injection  Patient Tolerance:comfortable throughout block  Complications:no  Additional Notes  Procedure:                                                         The pt was placed in  lateral position.  The Insertion site was  prepped and Draped in sterile fashion.  The pt was anesthetized with  IV Sedation( see meds).  Skin and cutaneous tissue was infiltrated and anesthetized with 1% Lidocaine 3 mls via a 25g needle.  A BBraun 4 inch 18g  echogenic needle was then  inserted approximately 3 cm proximal to the popliteal ann marie a at the lateral mid biceps femoris and advanced In-plane with Ultrasound guidance.  Normal Saline PSF was utilized for hydrodissection of tissue.  The popliteal artery was visualized and the common peroneal and tibial bifurcation was located.  LA injection spread was visualized, injection was incremental 1-5ml, injection pressure was normal or little, no intraneural injection, no vascular injection.  .  A BBraun 20g wire stylet catheter was placed via the needle with ultrasound visualization and confirmation with NS fluid bolus. The labeled Catheter was then secured at insertions site with skin afix,  mastisol, steristrips  and a CHG transparent dressing was placed over. Thank you

## 2018-05-14 NOTE — ANESTHESIA POSTPROCEDURE EVALUATION
Patient: Sandoval Hernandez    Procedure Summary     Date:  05/14/18 Room / Location:   [facility] /  [facility]    Anesthesia Start:  1532 Anesthesia Stop:      Procedure:  AMPUTATION BELOW KNEE LEFT (Left Leg Lower) Diagnosis:       Gangrene of left foot      (Gangrene of left foot [I96])    Surgeon:  Danie Carreno MD Provider:  Rafat Brooks MD    Anesthesia Type:  general ASA Status:  3          Anesthesia Type: general  Last vitals  BP   142/71 (05/14/18 1419)   Temp   97.9 °F (36.6 °C) (05/14/18 1419)   Pulse   71 (05/14/18 1419)   Resp   20 (05/14/18 1419)     SpO2   96 % (05/14/18 1419)     Post Anesthesia Care and Evaluation    Patient location during evaluation: PACU  Patient participation: complete - patient participated  Level of consciousness: sleepy but conscious  Pain score: 0  Pain management: adequate  Airway patency: patent  Anesthetic complications: No anesthetic complications  PONV Status: none  Cardiovascular status: hemodynamically stable and acceptable  Respiratory status: nonlabored ventilation, acceptable and nasal cannula  Hydration status: acceptable

## 2018-05-14 NOTE — ANESTHESIA POSTPROCEDURE EVALUATION
Patient: Sandoval Hernandez    Procedure Summary     Date:  05/14/18 Room / Location:   [facility]    Anesthesia Start:  1532 Anesthesia Stop:  1740    Procedure:  AMPUTATION BELOW KNEE LEFT (Left Leg Lower) Diagnosis:       Gangrene of left foot      (Gangrene of left foot [I96])    Surgeon:  Danie Carreno MD Provider:  Rafat Brooks MD    Anesthesia Type:  general ASA Status:  3          Anesthesia Type: general  Last vitals  BP   142/71 (05/14/18 1419)   Temp   97.9 °F (36.6 °C) (05/14/18 1419)   Pulse   71 (05/14/18 1419)   Resp   20 (05/14/18 1419)     SpO2   96 % (05/14/18 1419)     Post Anesthesia Care and Evaluation    Patient location during evaluation: PACU  Patient participation: complete - patient participated  Level of consciousness: sleepy but conscious  Pain score: 0  Pain management: adequate  Airway patency: patent  Anesthetic complications: No anesthetic complications  PONV Status: none  Cardiovascular status: hemodynamically stable and acceptable  Respiratory status: nonlabored ventilation, acceptable and nasal cannula  Hydration status: acceptable

## 2018-05-14 NOTE — PLAN OF CARE
Problem: Fall Risk (Adult)  Goal: Absence of Fall  Outcome: Ongoing (interventions implemented as appropriate)      Problem: Patient Care Overview  Goal: Plan of Care Review  Outcome: Ongoing (interventions implemented as appropriate)      Problem: Infection, Risk/Actual (Adult)  Goal: Infection Prevention/Resolution  Outcome: Ongoing (interventions implemented as appropriate)      Problem: Pain, Acute (Adult)  Goal: Acceptable Pain Control/Comfort Level  Outcome: Ongoing (interventions implemented as appropriate)

## 2018-05-14 NOTE — PROGRESS NOTES
Cardiothoracic Surgery Progress Note           LOS: 3 days      Subjective: Awake alert     Chief Complaint: Still complains a left ischemic foot,sore, tender    Objective:  Vital Signs are signs below are noted  Temp:  [98.1 °F (36.7 °C)-98.7 °F (37.1 °C)] 98.1 °F (36.7 °C)  Heart Rate:  [62-80] 72  Resp:  [17-18] 17  BP: (110-160)/(59-79) 135/59    Physical Exam:   General Appearance: Oriented ×3   Lungs:few rhonchi   Heart:without R/M/G   Skin:warm, dry   Incision:   Left foot ulceration inspected new dressing applied with Xeroform and Kerlix wrap  Results: Laboratory results below are noted    Results from last 7 days  Lab Units 05/11/18  0116   WBC 10*3/mm3 9.85   HEMOGLOBIN g/dL 11.1*   HEMATOCRIT % 34.6*   PLATELETS 10*3/mm3 275       Results from last 7 days  Lab Units 05/12/18  0536   SODIUM mmol/L 141   POTASSIUM mmol/L 4.3   CHLORIDE mmol/L 107   CO2 mmol/L 25.0   BUN mg/dL 18   CREATININE mg/dL 0.80   GLUCOSE mg/dL 118*   CALCIUM mg/dL 9.3         Assessment:#1.  Ischemic gangrene left foot with cellulitis secondary to severe peripheral vascular disease  #2.  Severe peripheral vas disease of both lower extremities-failed endovascular procedure of left lower leg  #3.  Diabetes mellitus with diabetic neuropathy  Plan: We will plan left below-knee amputation on today, 05/14/2018.   Patient agrees to proceed ahead.  Patient is aware that we may have to proceed to a amputation at higher level in the future if this amputation stump does not heal      SHAMIR Krishnan - 05/14/18 - 7:53 AM

## 2018-05-15 LAB
ANION GAP SERPL CALCULATED.3IONS-SCNC: 7 MMOL/L (ref 3–11)
BASOPHILS # BLD AUTO: 0.01 10*3/MM3 (ref 0–0.2)
BASOPHILS NFR BLD AUTO: 0.1 % (ref 0–1)
BUN BLD-MCNC: 16 MG/DL (ref 9–23)
BUN/CREAT SERPL: 20 (ref 7–25)
CALCIUM SPEC-SCNC: 9.5 MG/DL (ref 8.7–10.4)
CHLORIDE SERPL-SCNC: 108 MMOL/L (ref 99–109)
CO2 SERPL-SCNC: 24 MMOL/L (ref 20–31)
CREAT BLD-MCNC: 0.8 MG/DL (ref 0.6–1.3)
DEPRECATED RDW RBC AUTO: 50.2 FL (ref 37–54)
EOSINOPHIL # BLD AUTO: 0.03 10*3/MM3 (ref 0–0.3)
EOSINOPHIL NFR BLD AUTO: 0.4 % (ref 0–3)
ERYTHROCYTE [DISTWIDTH] IN BLOOD BY AUTOMATED COUNT: 15.5 % (ref 11.3–14.5)
GFR SERPL CREATININE-BSD FRML MDRD: 99 ML/MIN/1.73
GLUCOSE BLD-MCNC: 100 MG/DL (ref 70–100)
GLUCOSE BLDC GLUCOMTR-MCNC: 108 MG/DL (ref 70–130)
GLUCOSE BLDC GLUCOMTR-MCNC: 115 MG/DL (ref 70–130)
GLUCOSE BLDC GLUCOMTR-MCNC: 120 MG/DL (ref 70–130)
GLUCOSE BLDC GLUCOMTR-MCNC: 124 MG/DL (ref 70–130)
HCT VFR BLD AUTO: 30 % (ref 38.9–50.9)
HGB BLD-MCNC: 9.5 G/DL (ref 13.1–17.5)
IMM GRANULOCYTES # BLD: 0.03 10*3/MM3 (ref 0–0.03)
IMM GRANULOCYTES NFR BLD: 0.4 % (ref 0–0.6)
LYMPHOCYTES # BLD AUTO: 1.39 10*3/MM3 (ref 0.6–4.8)
LYMPHOCYTES NFR BLD AUTO: 17.6 % (ref 24–44)
MCH RBC QN AUTO: 28.2 PG (ref 27–31)
MCHC RBC AUTO-ENTMCNC: 31.7 G/DL (ref 32–36)
MCV RBC AUTO: 89 FL (ref 80–99)
MONOCYTES # BLD AUTO: 0.4 10*3/MM3 (ref 0–1)
MONOCYTES NFR BLD AUTO: 5.1 % (ref 0–12)
NEUTROPHILS # BLD AUTO: 6.09 10*3/MM3 (ref 1.5–8.3)
NEUTROPHILS NFR BLD AUTO: 76.8 % (ref 41–71)
PLATELET # BLD AUTO: 216 10*3/MM3 (ref 150–450)
PMV BLD AUTO: 11.4 FL (ref 6–12)
POTASSIUM BLD-SCNC: 3.6 MMOL/L (ref 3.5–5.5)
RBC # BLD AUTO: 3.37 10*6/MM3 (ref 4.2–5.76)
SODIUM BLD-SCNC: 139 MMOL/L (ref 132–146)
WBC NRBC COR # BLD: 7.92 10*3/MM3 (ref 3.5–10.8)

## 2018-05-15 PROCEDURE — 97162 PT EVAL MOD COMPLEX 30 MIN: CPT

## 2018-05-15 PROCEDURE — 25010000002 ROPIVACAINE PER 1 MG: Performed by: NURSE ANESTHETIST, CERTIFIED REGISTERED

## 2018-05-15 PROCEDURE — 25010000002 HEPARIN (PORCINE) PER 1000 UNITS: Performed by: PHYSICIAN ASSISTANT

## 2018-05-15 PROCEDURE — 99024 POSTOP FOLLOW-UP VISIT: CPT | Performed by: THORACIC SURGERY (CARDIOTHORACIC VASCULAR SURGERY)

## 2018-05-15 PROCEDURE — 85025 COMPLETE CBC W/AUTO DIFF WBC: CPT | Performed by: PHYSICIAN ASSISTANT

## 2018-05-15 PROCEDURE — 25010000002 PIPERACILLIN SOD-TAZOBACTAM PER 1 G: Performed by: INTERNAL MEDICINE

## 2018-05-15 PROCEDURE — 99233 SBSQ HOSP IP/OBS HIGH 50: CPT | Performed by: INTERNAL MEDICINE

## 2018-05-15 PROCEDURE — 80048 BASIC METABOLIC PNL TOTAL CA: CPT | Performed by: PHYSICIAN ASSISTANT

## 2018-05-15 PROCEDURE — 97116 GAIT TRAINING THERAPY: CPT

## 2018-05-15 PROCEDURE — 82962 GLUCOSE BLOOD TEST: CPT

## 2018-05-15 PROCEDURE — 25010000002 MORPHINE PER 10 MG: Performed by: THORACIC SURGERY (CARDIOTHORACIC VASCULAR SURGERY)

## 2018-05-15 PROCEDURE — 25010000002 CEFUROXIME: Performed by: PHYSICIAN ASSISTANT

## 2018-05-15 RX ORDER — ACETAMINOPHEN 325 MG/1
650 TABLET ORAL EVERY 6 HOURS PRN
Status: DISCONTINUED | OUTPATIENT
Start: 2018-05-15 | End: 2018-05-18 | Stop reason: HOSPADM

## 2018-05-15 RX ADMIN — CEFUROXIME 1.5 G: 1.5 INJECTION, POWDER, FOR SOLUTION INTRAVENOUS at 00:37

## 2018-05-15 RX ADMIN — LISINOPRIL 40 MG: 20 TABLET ORAL at 08:48

## 2018-05-15 RX ADMIN — TAZOBACTAM SODIUM AND PIPERACILLIN SODIUM 4.5 G: 500; 4 INJECTION, SOLUTION INTRAVENOUS at 08:47

## 2018-05-15 RX ADMIN — ACETAMINOPHEN 650 MG: 325 TABLET, FILM COATED ORAL at 21:33

## 2018-05-15 RX ADMIN — HEPARIN SODIUM 5000 UNITS: 5000 INJECTION, SOLUTION INTRAVENOUS; SUBCUTANEOUS at 08:47

## 2018-05-15 RX ADMIN — CARVEDILOL 25 MG: 12.5 TABLET, FILM COATED ORAL at 17:55

## 2018-05-15 RX ADMIN — ROPIVACAINE HYDROCHLORIDE 12 ML/HR: 2 INJECTION, SOLUTION EPIDURAL; INFILTRATION at 19:08

## 2018-05-15 RX ADMIN — CEFUROXIME 1.5 G: 1.5 INJECTION, POWDER, FOR SOLUTION INTRAVENOUS at 05:38

## 2018-05-15 RX ADMIN — AMLODIPINE BESYLATE 10 MG: 10 TABLET ORAL at 08:47

## 2018-05-15 RX ADMIN — OXYCODONE HYDROCHLORIDE AND ACETAMINOPHEN 1 TABLET: 5; 325 TABLET ORAL at 07:43

## 2018-05-15 RX ADMIN — TAZOBACTAM SODIUM AND PIPERACILLIN SODIUM 4.5 G: 500; 4 INJECTION, SOLUTION INTRAVENOUS at 00:37

## 2018-05-15 RX ADMIN — CLONIDINE HYDROCHLORIDE 0.1 MG: 0.1 TABLET ORAL at 21:33

## 2018-05-15 RX ADMIN — ASPIRIN 81 MG 324 MG: 81 TABLET ORAL at 08:48

## 2018-05-15 RX ADMIN — ATORVASTATIN CALCIUM 80 MG: 40 TABLET, FILM COATED ORAL at 21:33

## 2018-05-15 RX ADMIN — OXYCODONE HYDROCHLORIDE AND ACETAMINOPHEN 1 TABLET: 5; 325 TABLET ORAL at 15:57

## 2018-05-15 RX ADMIN — HEPARIN SODIUM 5000 UNITS: 5000 INJECTION, SOLUTION INTRAVENOUS; SUBCUTANEOUS at 21:34

## 2018-05-15 RX ADMIN — TAZOBACTAM SODIUM AND PIPERACILLIN SODIUM 4.5 G: 500; 4 INJECTION, SOLUTION INTRAVENOUS at 17:55

## 2018-05-15 RX ADMIN — CARVEDILOL 25 MG: 12.5 TABLET, FILM COATED ORAL at 08:47

## 2018-05-15 RX ADMIN — MORPHINE SULFATE 2 MG: 4 INJECTION, SOLUTION INTRAMUSCULAR; INTRAVENOUS at 12:02

## 2018-05-15 RX ADMIN — CLONIDINE HYDROCHLORIDE 0.1 MG: 0.1 TABLET ORAL at 08:48

## 2018-05-15 RX ADMIN — ROPINIROLE 1 MG: 0.5 TABLET, FILM COATED ORAL at 21:33

## 2018-05-15 RX ADMIN — ALLOPURINOL 300 MG: 300 TABLET ORAL at 08:48

## 2018-05-15 RX ADMIN — DOXYCYCLINE 100 MG: 100 CAPSULE ORAL at 08:48

## 2018-05-15 RX ADMIN — DOXYCYCLINE 100 MG: 100 CAPSULE ORAL at 21:33

## 2018-05-15 NOTE — THERAPY EVALUATION
Acute Care - Physical Therapy Initial Evaluation  [facility]     Patient Name: Sandoval Hernandez  : 1960  MRN: [id number]  Today's Date: 5/15/2018   Onset of Illness/Injury or Date of Surgery: 18  Date of Referral to PT: 18  Referring Physician: SHAMIR Lott      Admit Date: 2018    Visit Dx:     ICD-10-CM ICD-9-CM   1. Gangrene of left foot I96 785.4   2. Impaired functional mobility, balance, gait, and endurance Z74.09 V49.89     Patient Active Problem List   Diagnosis   • Cellulitis of left lower extremity   • Gangrene of left foot   • PAD (peripheral artery disease)   • Diabetes mellitus, type 2   • Tobacco abuse   • Renal insufficiency   • Hypertension     Past Medical History:   Diagnosis Date   • Diabetes mellitus    • Gout    • Hypertension      Past Surgical History:   Procedure Laterality Date   • AMPUTATION FOOT / TOE      amputation of toes to left foot   • BELOW KNEE AMPUTATION Left 2018    Procedure: AMPUTATION BELOW KNEE LEFT;  Surgeon: Danie Carreno MD;  [facility]  Service: Vascular        PT ASSESSMENT (last 12 hours)      Physical Therapy Evaluation     Row Name 05/15/18 0925          PT Evaluation Time/Intention    Subjective Information no complaints  -KM     Document Type evaluation  -KM     Mode of Treatment physical therapy  -KM     Patient Effort excellent  -KM     Row Name 05/15/18 0925          General Information    Patient Profile Reviewed? yes  -KM     Onset of Illness/Injury or Date of Surgery 18  -KM     Referring Physician SHAMIR Lott  -KM     Patient Observations alert;cooperative;agree to therapy  -KM     Prior Level of Function independent:;gait;transfer;bed mobility;ADL's  -KM     Equipment Currently Used at Home none  -KM     Pertinent History of Current Functional Problem Pt is post op day one L BKA with h/o multiple failed stents and toe amputations,cellulitis and gangrene  -KM     Existing Precautions/Restrictions fall   francoise drain,  nerve catheter  -KM     Risks Reviewed patient:;LOB  -KM     Benefits Reviewed patient:;improve function  -KM     Barriers to Rehab none identified  -KM     Row Name 05/15/18 0925          Relationship/Environment    Primary Source of Support/Comfort spouse  -KM     Lives With spouse  -KM     Row Name 05/15/18 0925          Resource/Environmental Concerns    Current Living Arrangements home/apartment/condo  -KM     Resource/Environmental Concerns none  -KM     Row Name 05/15/18 0925          Home Main Entrance    Number of Stairs, Main Entrance three  -KM     Stair Railings, Main Entrance railing on left side (ascending)  -KM     Row Name 05/15/18 0925          Cognitive Assessment/Intervention- PT/OT    Orientation Status (Cognition) oriented x 4  -KM     Row Name 05/15/18 0925          Bed Mobility Assessment/Treatment    Bed Mobility Assessment/Treatment scooting/bridging;supine-sit  -KM     [place] Level (Bed Mobility) independent  -KM     Row Name 05/15/18 0925          Transfer Assessment/Treatment    Transfer Assessment/Treatment sit-stand transfer;stand-sit transfer  -KM     Sit-Stand [place] (Transfers) contact guard  -KM     Stand-Sit [place] (Transfers) verbal cues;contact guard  -KM     Row Name 05/15/18 0925          Sit-Stand Transfer    Assistive Device (Sit-Stand Transfers) walker, front-wheeled  -KM     Row Name 05/15/18 0925          Stand-Sit Transfer    Assistive Device (Stand-Sit Transfers) walker, front-wheeled  -KM     Row Name 05/15/18 0925          Gait/Stairs Assessment/Training    Gait/Stairs Assessment/Training gait/ambulation independence   cues not to step beyond front of wx  -KM     [place] Level (Gait) contact guard;1 person to manage equipment  -KM     Assistive Device (Gait) walker, front-wheeled  -KM     Distance in Feet (Gait) 75  -KM     Pattern (Gait) step-to  -KM     Row Name 05/15/18 0925          General ROM    GENERAL ROM COMMENTS --   B l/e wfls, 0  degree L knee ext  -[facility] Name 05/15/18 0925          General Assessment (Manual Muscle Testing)    General Manual Muscle Testing (MMT) Assessment no strength deficits identified  -KM     Row Name 05/15/18 0925          Motor Assessment/Intervention    Additional Documentation Balance (Group);Therapeutic Exercise (Group)  -[facility] Name 05/15/18 0925          Therapeutic Exercise    Therapeutic Exercise supine, lower extremities  -     Additional Documentation Therapeutic Exercise ([facility])  -KM     Row Name 05/15/18 0925          Lower Extremity Supine Therapeutic Exercise    Performed, Supine Lower Extremity (Therapeutic Exercise) SLR (straight leg raise)   L l/e QS  -KM     Row Name 05/15/18 0925          Balance    Balance dynamic sitting balance;static standing balance  -KM     Row Name 05/15/18 0925          Dynamic Sitting Balance    Level of [place], Reaches Outside Midline (Sitting, Dynamic Balance) supervision  -KM     Sitting Position, Reaches Outside Midline (Sitting, Dynamic Balance) sitting on edge of bed  -KM     Row Name 05/15/18 0925          Static Standing Balance    Level of [place] (Supported Standing, Static Balance) contact guard assist  -     Time Able to Maintain Position (Supported Standing, Static Balance) 30 to 45 seconds  -     Assistive Device Utilized (Supported Standing, Static Balance) rolling walker  -KM     Row Name 05/15/18 0925          Pain Assessment    Additional Documentation Pain Scale: Numbers Pre/Post-Treatment (Group)  -KM     Row Name 05/15/18 0925          Pain Scale: Numbers Pre/Post-Treatment    Pain Scale: Numbers, Pretreatment 0/10 - no pain  -     Pain Scale: Numbers, Post-Treatment 0/10 - no pain  -[facility] Name             Wound 05/12/18 0800 Right foot other (see comments)    Wound - Properties Group Date first assessed: 05/12/18  -RY Time first assessed: 0800  -RY Present On Admission : yes  -RY Side: Right  -RY Location: foot  -RY Type:  other (see comments)  -RY, gangrene     Row Name             Wound 05/14/18 1720 Left leg incision    Wound - Properties Group Date first assessed: 05/14/18  -BW Time first assessed: 1720  -BW Side: Left  -BW Location: leg  -BW Type: incision  -BW    Row Name 05/15/18 0925          Plan of Care Review    Plan of Care Reviewed With patient  -KM     Row Name 05/15/18 0925          Physical Therapy Clinical Impression    Date of Referral to PT 05/14/18  -KM     PT Diagnosis (PT Clinical Impression) impaired mobility  -KM     Patient/Family Goals Statement (PT Clinical Impression) regain independent function  -KM     Criteria for Skilled Interventions Met (PT Clinical Impression) yes  -KM     Rehab Potential (PT Clinical Summary) good, to achieve stated therapy goals  -KM     Care Plan Review (PT) evaluation/treatment results reviewed;care plan/treatment goals reviewed;risks/benefits reviewed;patient/other agree to care plan  -KM     Row Name 05/15/18 0925          Physical Therapy Goals    Bed Mobility Goal Selection (PT) bed mobility, PT goal 1  -KM     Transfer Goal Selection (PT) transfer, PT goal 1  -KM     Gait Training Goal Selection (PT) gait training, PT goal 1  -KM     Stairs Goal Selection (PT) stairs, PT goal 1  -KM     Additional Documentation Stairs Goal Selection (PT) (Row)  -KM     Row Name 05/15/18 0925          Bed Mobility Goal 1 (PT)    Activity/Assistive Device (Bed Mobility Goal 1, PT) bed mobility activities, all  -KM     [place] Level/Cues Needed (Bed Mobility Goal 1, PT) independent  -KM     Time Frame (Bed Mobility Goal 1, PT) 10 days  -KM     Row Name 05/15/18 0925          Transfer Goal 1 (PT)    Activity/Assistive Device (Transfer Goal 1, PT) sit-to-stand/stand-to-sit;bed-to-chair/chair-to-bed;walker, rolling  -KM     [place] Level/Cues Needed (Transfer Goal 1, PT) independent  -KM     Time Frame (Transfer Goal 1, PT) 10 days  -KM     Row Name 05/15/18 0925          Gait Training  Goal 1 (PT)    Activity/Assistive Device (Gait Training Goal 1, PT) gait (walking locomotion);walker, rolling  -KM     [place] Level (Gait Training Goal 1, PT) independent  -KM     Distance (Gait Goal 1, PT) 100  -KM     Time Frame (Gait Training Goal 1, PT) 10 days  -KM     Row Name 05/15/18 0925          Stairs Goal 1 (PT)    Activity/Assistive Device (Stairs Goal 1, PT) ascending stairs;descending stairs;using handrail;crutches, axillary  -KM     [place] Level/Cues Needed (Stairs Goal 1, PT) independent  -KM     Number of Stairs (Stairs Goal 1, PT) 3  -KM     Time Frame (Stairs Goal 1, PT) 10 days  -KM     Row Name 05/15/18 0925          Patient Education Goal (PT)    Activity (Patient Education Goal, PT) Independent in BKA positioning and HEP  -KM     Time Frame (Patient Education Goal, PT) 10 days  -KM     Row Name 05/15/18 0925          Positioning and Restraints    Pre-Treatment Position in bed  -KM     Post Treatment Position chair  -KM     In Chair reclined;call light within reach;encouraged to call for assist  -KM     Row Name 05/15/18 0925          Living Environment    Home Accessibility stairs to enter home  -KM       User Key  (r) = Recorded By, (t) = Taken By, (c) = Cosigned By    Initials Name Provider Type    SARIAH Torres, PT Physical Therapist    RY Norton, RN Registered Nurse    KARSON Corral, RN Registered Nurse          Physical Therapy Education     Title: PT OT SLP Therapies (Done)     Topic: Physical Therapy (Done)     Point: Mobility training (Done)    Learning Progress Summary     Learner Status Readiness Method Response Comment Documented by    Patient Done Janice MENDOZA discussed plan of care and preprosthetic rehab  05/15/18 1040          Point: Home exercise program (Done)    Learning Progress Summary     Learner Status Readiness Method Response Comment Documented by    Patient Glen MENDOZA discussed plan of care and preprosthetic rehab   05/15/18 1048          Point: Body mechanics (Done)    Learning Progress Summary     Learner Status Readiness Method Response Comment Documented by    Patient Done Janice MENDOZA discussed plan of care and preprosthetic rehab  05/15/18 1048          Point: Precautions (Done)    Learning Progress Summary     Learner Status Readiness Method Response Comment Documented by    Patient Done Janice MENDOZA discussed plan of care and preprosthetic rehab  05/15/18 1048                      User Key     Initials Effective Dates Name Provider Type Discipline     06/19/15 -  Jazmine Torres, PT Physical Therapist PT                PT Recommendation and Plan  Anticipated Discharge Disposition (PT): inpatient rehab facility ([facility] consideration)  Planned Therapy Interventions (PT Eval): balance training, bed mobility training, gait training, transfer training, home exercise program  Therapy Frequency (PT Clinical Impression): daily  Outcome Summary/Treatment Plan (PT)  Anticipated Equipment Needs at Discharge (PT): front wheeled walker  Anticipated Discharge Disposition (PT): inpatient rehab facility ([facility] consideration)  Plan of Care Reviewed With: patient  Outcome Summary: Pt mobilizing well ambul 75 ft with r wx and c.g.assist.Pt motivated to participate in pre prosthetic rehab. Anticipate [facility] vs home with HHPT          Outcome Measures     Row Name 05/15/18 0925             How much help from another person do you currently need...    Turning from your back to your side while in flat bed without using bedrails? 4  -KM      Moving from lying on back to sitting on the side of a flat bed without bedrails? 4  -KM      Moving to and from a bed to a chair (including a wheelchair)? 3  -KM      Standing up from a chair using your arms (e.g., wheelchair, bedside chair)? 3  -KM      Climbing 3-5 steps with a railing? 2  -KM      To walk in hospital room? 3  -KM      AM-PAC 6 Clicks Score 19  -KM         Functional Assessment     Outcome Measure Options AM-PAC 6 Clicks Basic Mobility (PT)  -KM        User Key  (r) = Recorded By, (t) = Taken By, (c) = Cosigned By    Initials Name Provider Type    SARIAH Torres PT Physical Therapist           Time Calculation:         PT Charges     Row Name 05/15/18 1046             Time Calculation    Start Time 0925  -KM      PT Received On 05/15/18  -KM      PT Goal Re-Cert Due Date 05/25/18  -KM         Time Calculation- PT    Total Timed Code Minutes- PT 10 minute(s)  -KM        User Key  (r) = Recorded By, (t) = Taken By, (c) = Cosigned By    Initials Name Provider Type    SARIAH Torres PT Physical Therapist          Therapy Charges for Today     Code Description Service Date Service Provider Modifiers Qty    49366386540 HC PT EVAL MOD COMPLEXITY 4 5/15/2018 Jazmine Torres, PT GP 1    53417926320 HC PT THER SUPP EA 15 MIN 5/15/2018 Jazmine Torres, PT GP 2    90750886869 HC GAIT TRAINING EA 15 MIN 5/15/2018 Jazmine Torres, PT GP 1          PT G-Codes  Outcome Measure Options: AM-PAC 6 Clicks Basic Mobility (PT)      Jazmine Torres, PT  5/15/2018

## 2018-05-15 NOTE — PROGRESS NOTES
Sandoval Hernandez  1960  [id number]  5/15/2018    CC: Redness, pain, and swelling of left lower extremity    Sandoval Hernandez is a 58 y.o. male here for severe peripheral arterial disease, wet gangrene, poorly controlled diabetes mellitus.      Past medical history:  Past Medical History:   Diagnosis Date   • Diabetes mellitus    • Gout    • Hypertension        Medications:   Current Facility-Administered Medications:   •  allopurinol (ZYLOPRIM) tablet 300 mg, 300 mg, Oral, Daily, Marcel Fagan MD, 300 mg at 05/14/18 0844  •  amLODIPine (NORVASC) tablet 10 mg, 10 mg, Oral, Daily, Marcel Fagan MD, 10 mg at 05/14/18 0841  •  aspirin chewable tablet 324 mg, 324 mg, Oral, Daily, Marcel Fagan MD, 324 mg at 05/14/18 0841  •  atorvastatin (LIPITOR) tablet 80 mg, 80 mg, Oral, Nightly, Marcel Fagan MD, 80 mg at 05/14/18 2053  •  bisacodyl (DULCOLAX) suppository 10 mg, 10 mg, Rectal, Daily PRN, SHAMIR Emanuel  •  carvedilol (COREG) tablet 25 mg, 25 mg, Oral, BID With Meals, Marcel Fagan MD, 25 mg at 05/14/18 0841  •  CloNIDine (CATAPRES) tablet 0.1 mg, 0.1 mg, Oral, Q12H, Marcel Fagan MD, 0.1 mg at 05/14/18 2053  •  dextrose (D50W) solution 25 g, 25 g, Intravenous, Q15 Min PRN, Marcel Fagan MD  •  dextrose (GLUTOSE) oral gel 15 g, 15 g, Oral, Q15 Min PRN, Marcel Fagan MD  •  doxycycline (MONODOX) capsule 100 mg, 100 mg, Oral, Q12H, Dc Coates MD, 100 mg at 05/14/18 2053  •  glucagon (human recombinant) (GLUCAGEN DIAGNOSTIC) injection 1 mg, 1 mg, Subcutaneous, PRN, Marcel Fagan MD  •  heparin (porcine) 5000 UNIT/ML injection 5,000 Units, 5,000 Units, Subcutaneous, Q12H, Yuniel C Locklar, PA  •  insulin lispro (humaLOG) injection 0-9 Units, 0-9 Units, Subcutaneous, 4x Daily With Meals & Nightly, Marcel Fagan MD, 2 Units at 05/12/18 1146  •  lisinopril (PRINIVIL,ZESTRIL) tablet 40 mg, 40 mg, Oral, Daily, Marcel Fagan MD, 40 mg at 05/14/18 0841  •  Morphine sulfate (PF) injection 2 mg, 2 mg,  Intravenous, Q4H PRN, Danie Carreno MD  •  nicotine (NICODERM CQ) 21 MG/24HR patch 1 patch, 1 patch, Transdermal, Daily, Marcel Fagan MD, 1 patch at 05/12/18 0830  •  ondansetron (ZOFRAN) tablet 4 mg, 4 mg, Oral, Q6H PRN **OR** ondansetron (ZOFRAN) injection 4 mg, 4 mg, Intravenous, Q6H PRN, SHAMIR Eamnuel  •  oxyCODONE-acetaminophen (PERCOCET) 5-325 MG per tablet 1 tablet, 1 tablet, Oral, Q4H PRN, Marcel Fagan MD, 1 tablet at 05/15/18 0743  •  piperacillin-tazobactam (ZOSYN) 4.5 g in iso-osmotic dextrose 100 mL IVPB (premix), 4.5 g, Intravenous, Q8H, Marcel Fagan MD, 4.5 g at 05/15/18 0037  •  rOPINIRole (REQUIP) tablet 1 mg, 1 mg, Oral, Nightly, Marcel Fagan MD, 1 mg at 05/14/18 2053  •  ropivacaine (NAROPIN) 0.2% peripheral nerve cath (moog), 6 mL/hr, Peripheral Nerve, Continuous, Rafat Brooks MD, Last Rate: 14 mL/hr at 05/15/18 0740, 14 mL/hr at 05/15/18 0740  •  sennosides-docusate sodium (SENOKOT-S) 8.6-50 MG tablet 2 tablet, 2 tablet, Oral, BID PRN, SHAMIR Emanuel  •  sodium chloride 0.45 % infusion, 35 mL/hr, Intravenous, Continuous, SHAMIR Emanuel, Last Rate: 35 mL/hr at 05/14/18 1855, 35 mL/hr at 05/14/18 1855  •  sodium chloride 0.9 % flush 1-10 mL, 1-10 mL, Intravenous, PRN, Marcel Fagan MD  •  Tdap (BOOSTRIX) injection 0.5 mL, 0.5 mL, Intramuscular, During Hospitalization, Dc Coates MD  Antibiotics:  Anti-Infectives     Ordered     Dose/Rate Route Frequency Start Stop    05/14/18 1746  cefuroxime (ZINACEF) 1.5 g/100 mL 0.9% NS IVPB (mbp)     Ordering Provider:  SHAMIR Emanuel    1.5 g  200 mL/hr over 30 Minutes Intravenous Every 8 Hours 05/14/18 2300 05/15/18 0608    05/14/18 1357  cefuroxime (ZINACEF) 1.5 g/100 mL 0.9% NS IVPB (mbp)     Ordering Provider:  SHAMIR Gaston    1.5 g  over 30 Minutes Intravenous 60 Minutes Pre-Op 05/14/18 1357 05/14/18 1532    05/11/18 1341  doxycycline (MONODOX) capsule 100 mg     Ordering Provider:  Dc Coates MD    100  "mg Oral Every 12 Hours Scheduled 05/11/18 1800 05/18/18 2059    05/11/18 1026  piperacillin-tazobactam (ZOSYN) 4.5 g in iso-osmotic dextrose 100 mL IVPB (premix)     Ordering Provider:  Marcel Fagan MD    4.5 g  over 4 Hours Intravenous Every 8 Hours 05/11/18 1700 05/18/18 1659    05/11/18 1026  piperacillin-tazobactam (ZOSYN) 4.5 g in iso-osmotic dextrose 100 mL IVPB (premix)     Ordering Provider:  Marcel Fagan MD    4.5 g  over 30 Minutes Intravenous Once 05/11/18 1100 05/11/18 1254          Allergies:  has No Known Allergies.    Review of Systems: All other reviewed and negative except as per HPI    Blood pressure 160/76, pulse 90, temperature 97.9 °F (36.6 °C), temperature source Oral, resp. rate 18, height 162.6 cm (64.02\"), weight 84.4 kg (186 lb), SpO2 96 %.  GENERAL: Awake and alert, in no acute distress. Denies fever chills or night sweats.  No cough or purulent sputum production.  Some phantom pain overnight but minimal.  HEENT: Oropharynx without thrush. Sinuses nontender. Dentition in poor repair. No cervical adenopathy. No carotid bruits/ jugular venous distention.   EYES: PERRL. No conjunctival injection. No icterus. EOMI.  LYMPHATICS: No lymphadenopathy of the neck or axillary or inguinal regions.   HEART: No murmur, gallop, or pericardial friction rub.   LUNGS: Clear to auscultation anteriorly. No percussion dullness.   ABDOMEN: Soft, nontender, nondistended. No appreciable HSM.    SKIN: Warm and dry without cutaneous eruptions. No embolic stigmata.  Left BKA dressing with scant serosanguineous drainage.  PSYCHIATRIC: Mental status lucid. Cranial nerve function intact.       DIAGNOSTICS:  Lab Results   Component Value Date    WBC 9.85 05/11/2018    HGB 11.1 (L) 05/11/2018    HCT 34.6 (L) 05/11/2018     05/11/2018     Lab Results   Component Value Date    CRP 17.26 (H) 05/11/2018     Lab Results   Component Value Date    SEDRATE 36 (H) 05/11/2018     Lab Results   Component Value Date    " GLUCOSE 118 (H) 05/12/2018    BUN 18 05/12/2018    CREATININE 0.80 05/12/2018    EGFRIFNONA 99 05/12/2018    BCR 22.5 05/12/2018    CO2 25.0 05/12/2018    CALCIUM 9.3 05/12/2018    ALBUMIN 4.60 05/11/2018    LABIL2 1.3 (L) 05/11/2018    AST 68 (H) 05/11/2018    ALT 83 (H) 05/11/2018       Microbiology:Admission blood cultures ×2 negative.  Swab cultures from ruptured bulla on dorsal left foot negative.    RADIOLOGY:  Imaging Results (last 72 hours)     Procedure Component Value Units Date/Time    XR Toe 2+ View Left [[id number]] Collected:  05/11/18 2033     Updated:  05/13/18 1432    Narrative:          EXAMINATION: XR TOE 2+ VW LEFT - 05/11/2018     INDICATION:  [id number], not elsewhere classified.     COMPARISON: None.     FINDINGS: Status post amputations at the level of the base of the  proximal phalanx first and fourth digits with cortical margins fairly  well-preserved. No evidence for definitive osseous erosion or soft  tissue gas. Remaining non amputated digits grossly unremarkable with  preserved joint spaces. No acute fracture.           Impression:       Post amputation changes first and fourth digits without  evidence for osseous erosion or lucency to suggest osteolytic change. No  soft tissue emphysema. Further imaging may be useful if symptoms persist  and/or progress to fully evaluate for underlying osteomyelitis.     DICTATED:     05/11/2018  EDITED/ls :     05/11/2018      This report was finalized on 5/13/2018 2:30 PM by Dr. Italo Philip.             Assessment and Plan: Type 2 diabetes mellitus with insulin requirement.  Severe peripheral arterial disease.  Previous digital amputations on left foot.  Recent stent deployment to left femoral artery/presumed atheroembolic event.  Bullous cellulitis and ischemic gangrene of left foot/status post below-knee amputation.  Maximum temperature over 24 hours 98.9°.  Currently 97.9°.  Plasma creatinine 0.8.  Suggest 48-72 hours of postoperative oral  doxycycline and piperacillin tazobactam.  Anticipate transfer to [facility].  I will be out of town for the next 3 days.  L IDC available if problems.      Dc Coates MD  5/15/2018

## 2018-05-15 NOTE — PROGRESS NOTES
[facility] Medicine Services  PROGRESS NOTE    Patient Name: Sandoval Hernandez  : 1960  MRN: [id number]    Date of Admission: 2018  Length of Stay: 4  Primary Care Physician: ALETA Plummer    Subjective   Subjective     CC: left foor gangrene    HPI:No acute events overnight, patient states that he is doing ok, pain is well controlled, he has no new complaint.    Review of Systems  Gen- No fevers, chills  CV- No chest pain, palpitations  Resp- No cough, dyspnea  GI- No N/V/D, abd pain    Otherwise ROS is negative except as mentioned in the HPI.    Objective   Objective     Vital Signs:   Temp:  [97.3 °F (36.3 °C)-98.9 °F (37.2 °C)] 97.8 °F (36.6 °C)  Heart Rate:  [] 102  Resp:  [18-20] 20  BP: ()/(36-79) 131/74      Physical Exam  Constitutional: No acute distress, awake, alert, comfortable  HENT: NCAT, mucous membranes moist  Respiratory: Clear to auscultation bilaterally, respiratory effort normal   Cardiovascular: RRR, no murmurs, rubs, or gallops, palpable pedal pulses bilaterally  Gastrointestinal: Positive bowel sounds, soft, nontender, nondistended  Musculoskeletal: No bilateral ankle edema, left BKA, stump in dressing, JAREN drain inplace  Psychiatric: Appropriate affect, cooperative  Neurologic: Oriented x 3, strength symmetric in all extremities, Cranial Nerves grossly intact to confrontation, speech clear  Skin: No rashes    Results Reviewed:  I have personally reviewed current lab, radiology, and data and agree.      Results from last 7 days  Lab Units 05/15/18  0658 18  0116   WBC 10*3/mm3 7.92 9.85   HEMOGLOBIN g/dL 9.5* 11.1*   HEMATOCRIT % 30.0* 34.6*   PLATELETS 10*3/mm3 216 275       Results from last 7 days  Lab Units 05/15/18  0658 18  0536 18  0116   SODIUM mmol/L 139 141 138   POTASSIUM mmol/L 3.6 4.3 3.6   CHLORIDE mmol/L 108 107 108   CO2 mmol/L 24.0 25.0 20.3*   BUN mg/dL 16 18 34*   CREATININE mg/dL 0.80 0.80 1.23   GLUCOSE  mg/dL 100 118* 199*   CALCIUM mg/dL 9.5 9.3 10.8*   ALT (SGPT) U/L  --   --  83*   AST (SGOT) U/L  --   --  68*     Estimated Creatinine Clearance: 98.7 mL/min (by C-G formula based on SCr of 0.8 mg/dL).  No results found for: BNP  No results found for: PHART    Microbiology Results Abnormal     Procedure Component Value - Date/Time    Wound Culture - Wound, Foot [[id number]]  (Normal) Collected:  05/11/18 1111    Lab Status:  Final result Specimen:  Wound from Foot Updated:  05/13/18 1055     Wound Culture No growth at 2 days     Gram Stain Result No WBCs or organisms seen        I have reviewed the medications.    Assessment/Plan   Assessment / Plan     Hospital Problem List     * (Principal)Gangrene of left foot    Cellulitis of left lower extremity    PAD (peripheral artery disease)    Diabetes mellitus, type 2    Tobacco abuse    Renal insufficiency    Hypertension         Brief Hospital Course to date:  Sandoval Hernandez is a 58 y.o. male with a history of type 2 diabetes, ongoing tobacco abuse, renal specialist and see, peripheral arterial disease who presented to [facility] with a gangrenous left foot.  Records from [facility] indicate he was just discharged later on May 8 and during that stay had a stenting of his left common iliac artery and left superior femoral artery.  He also required repeat intervention to the left superficial femoral artery stent due to fracture.  After discharge with continued pain and gangrene and is transferred to [place] for further evaluation.     Assessment & Plan:  - s/p left BKA 5/14 with Dr. Carreno, continue zosyn and doxy per ID  - continue ASA and will resume plavix, continue increased statin dose.  - a1c 7.3% , currently acceptable glc on current insulin.  - continue nicotine patch, counseled regarding need for cessation  - Cr down to 0.8 from 1.2 in [place]  - will likely need rehab after his BKA     DVT Prophylaxis: [facility]     CODE STATUS: Full Code     Disposition: I  expect the patient to be discharged TBD    Electronically signed by Kacey Irving MD, 05/15/18, 10:58 AM.

## 2018-05-15 NOTE — PROGRESS NOTES
Cardiothoracic Surgery Progress Note    -POD #: 1-left below-knee amputation     LOS: 4 days      Subjective: Awake and alert    Chief Complaint: Left below  knee amputation stump pain    Objective:  Vital Signs vital signs below noted  Temp:  [97.3 °F (36.3 °C)-98.9 °F (37.2 °C)] 97.8 °F (36.6 °C)  Heart Rate:  [] 102  Resp:  [18-20] 20  BP: ()/(36-79) 131/74    Physical Exam:   General Appearance: Oriented ×3   Lungs:   Heart:   Skin:   Incision:   Stump incision dry and intact.  JAREN drain 85 ML's postop  Results: Laboratory results below are noted    Results from last 7 days  Lab Units 05/15/18  0658   WBC 10*3/mm3 7.92   HEMOGLOBIN g/dL 9.5*   HEMATOCRIT % 30.0*   PLATELETS 10*3/mm3 216       Results from last 7 days  Lab Units 05/15/18  0658   SODIUM mmol/L 139   POTASSIUM mmol/L 3.6   CHLORIDE mmol/L 108   CO2 mmol/L 24.0   BUN mg/dL 16   CREATININE mg/dL 0.80   GLUCOSE mg/dL 100   CALCIUM mg/dL 9.5         Assessment:#1.  Postop day 1 left below-knee amputation    #2 left below-knee amputation secondary to gangrene with diabetes mellitus and severe peripheral vascular disease    Plan: We will change surgical dressing tomorrow.  Intravenous antibiotics per infectious disease.  Overall medical management per hospitalist.  We will ask [facility] orthopedics to see for a below-knee amputation stump protector      Danie Carreno MD - 05/15/18 - 9:19 AM

## 2018-05-15 NOTE — PLAN OF CARE
Problem: Patient Care Overview  Goal: Plan of Care Review  Outcome: Ongoing (interventions implemented as appropriate)   05/15/18 1047   OTHER   Outcome Summary Pt mobilizing well ambul 75 ft with r eric and [email or web address].Pt motivated to participate in pre prosthetic rehab. Anticipate [facility] vs home with HHPT

## 2018-05-15 NOTE — PROGRESS NOTES
[facility]    Acute pain service Inpatient Progress Note    Patient Name: Sandoval Hernandez  :  1960  MRN:  [id number]        Acute Pain  Service Inpatient Progress Note:    Analgesia:Fair  Pain Score:5/10  LOC: alert and awake  Resp Status: room air  Cardiac: VS stable  Side Effects:None  Catheter Site:clean, dressing intact and dry  Cath type: peripheral nerve cath(MOOG pump)  Infusion rate: 12ml/hr  Catheter Plan:Catheter to remain Insitu and Continue catheter infusion rate unchanged  Comments: Generalized Pain Vas 5/10.  Increased pump to 12 ml/hr

## 2018-05-16 LAB
BACTERIA SPEC AEROBE CULT: NORMAL
BACTERIA SPEC AEROBE CULT: NORMAL
CYTO UR: NORMAL
GLUCOSE BLDC GLUCOMTR-MCNC: 114 MG/DL (ref 70–130)
GLUCOSE BLDC GLUCOMTR-MCNC: 121 MG/DL (ref 70–130)
GLUCOSE BLDC GLUCOMTR-MCNC: 129 MG/DL (ref 70–130)
GLUCOSE BLDC GLUCOMTR-MCNC: 137 MG/DL (ref 70–130)
LAB AP CASE REPORT: NORMAL
LAB AP CLINICAL INFORMATION: NORMAL
Lab: NORMAL
PATH REPORT.FINAL DX SPEC: NORMAL
PATH REPORT.GROSS SPEC: NORMAL

## 2018-05-16 PROCEDURE — 99024 POSTOP FOLLOW-UP VISIT: CPT | Performed by: THORACIC SURGERY (CARDIOTHORACIC VASCULAR SURGERY)

## 2018-05-16 PROCEDURE — 82962 GLUCOSE BLOOD TEST: CPT

## 2018-05-16 PROCEDURE — 25010000002 HEPARIN (PORCINE) PER 1000 UNITS: Performed by: PHYSICIAN ASSISTANT

## 2018-05-16 PROCEDURE — 25010000002 MORPHINE PER 10 MG: Performed by: THORACIC SURGERY (CARDIOTHORACIC VASCULAR SURGERY)

## 2018-05-16 PROCEDURE — 25010000002 ROPIVACAINE PER 1 MG: Performed by: NURSE ANESTHETIST, CERTIFIED REGISTERED

## 2018-05-16 PROCEDURE — 25010000002 PIPERACILLIN SOD-TAZOBACTAM PER 1 G: Performed by: INTERNAL MEDICINE

## 2018-05-16 PROCEDURE — 99232 SBSQ HOSP IP/OBS MODERATE 35: CPT | Performed by: INTERNAL MEDICINE

## 2018-05-16 RX ORDER — ROPIVACAINE HYDROCHLORIDE 2 MG/ML
12 INJECTION, SOLUTION EPIDURAL; INFILTRATION CONTINUOUS
Status: DISCONTINUED | OUTPATIENT
Start: 2018-05-17 | End: 2018-05-18 | Stop reason: HOSPADM

## 2018-05-16 RX ADMIN — AMLODIPINE BESYLATE 10 MG: 10 TABLET ORAL at 08:34

## 2018-05-16 RX ADMIN — HEPARIN SODIUM 5000 UNITS: 5000 INJECTION, SOLUTION INTRAVENOUS; SUBCUTANEOUS at 20:50

## 2018-05-16 RX ADMIN — ATORVASTATIN CALCIUM 80 MG: 40 TABLET, FILM COATED ORAL at 20:49

## 2018-05-16 RX ADMIN — TAZOBACTAM SODIUM AND PIPERACILLIN SODIUM 4.5 G: 500; 4 INJECTION, SOLUTION INTRAVENOUS at 17:18

## 2018-05-16 RX ADMIN — DOXYCYCLINE 100 MG: 100 CAPSULE ORAL at 20:50

## 2018-05-16 RX ADMIN — TAZOBACTAM SODIUM AND PIPERACILLIN SODIUM 4.5 G: 500; 4 INJECTION, SOLUTION INTRAVENOUS at 08:33

## 2018-05-16 RX ADMIN — TAZOBACTAM SODIUM AND PIPERACILLIN SODIUM 4.5 G: 500; 4 INJECTION, SOLUTION INTRAVENOUS at 01:09

## 2018-05-16 RX ADMIN — CARVEDILOL 25 MG: 12.5 TABLET, FILM COATED ORAL at 08:33

## 2018-05-16 RX ADMIN — ASPIRIN 81 MG 324 MG: 81 TABLET ORAL at 08:34

## 2018-05-16 RX ADMIN — DOXYCYCLINE 100 MG: 100 CAPSULE ORAL at 08:33

## 2018-05-16 RX ADMIN — OXYCODONE HYDROCHLORIDE AND ACETAMINOPHEN 1 TABLET: 5; 325 TABLET ORAL at 09:54

## 2018-05-16 RX ADMIN — CLONIDINE HYDROCHLORIDE 0.1 MG: 0.1 TABLET ORAL at 08:35

## 2018-05-16 RX ADMIN — ROPIVACAINE HYDROCHLORIDE 12 ML/HR: 2 INJECTION, SOLUTION EPIDURAL; INFILTRATION at 15:06

## 2018-05-16 RX ADMIN — CARVEDILOL 25 MG: 12.5 TABLET, FILM COATED ORAL at 17:18

## 2018-05-16 RX ADMIN — HEPARIN SODIUM 5000 UNITS: 5000 INJECTION, SOLUTION INTRAVENOUS; SUBCUTANEOUS at 08:35

## 2018-05-16 RX ADMIN — CLONIDINE HYDROCHLORIDE 0.1 MG: 0.1 TABLET ORAL at 20:49

## 2018-05-16 RX ADMIN — ALLOPURINOL 300 MG: 300 TABLET ORAL at 08:33

## 2018-05-16 RX ADMIN — LISINOPRIL 40 MG: 20 TABLET ORAL at 08:34

## 2018-05-16 RX ADMIN — SODIUM CHLORIDE 35 ML/HR: 4.5 INJECTION, SOLUTION INTRAVENOUS at 01:09

## 2018-05-16 RX ADMIN — ROPINIROLE 1 MG: 0.5 TABLET, FILM COATED ORAL at 20:49

## 2018-05-16 RX ADMIN — MORPHINE SULFATE 2 MG: 4 INJECTION, SOLUTION INTRAMUSCULAR; INTRAVENOUS at 20:52

## 2018-05-16 RX ADMIN — OXYCODONE HYDROCHLORIDE AND ACETAMINOPHEN 1 TABLET: 5; 325 TABLET ORAL at 19:18

## 2018-05-16 NOTE — DISCHARGE PLACEMENT REQUEST
"Yasmine Lee  (58 y.o. Male)     From [place],   [phone]      Date of Birth Social Security Number Address Home Phone MRN    1960  [address]   [facility] [address] [phone] [id number]    [employer] Mar[employer] Status          Non-Synagogue        Admission Date Admission Type Admitting Provider Attending Provider Department, Room/Bed    18 Emergency Tavares Rodriguez MD West, Christopher R, MD [facility] 3E, S337/1    Discharge Date Discharge Disposition Discharge Destination                       Attending Provider:  Tavares Rodriguez MD    Allergies:  No Known Allergies    Isolation:  None   Infection:  None   Code Status:  FULL    Ht:  162.6 cm (64.02\")   Wt:  84.4 kg (186 lb)    Admission Cmt:  None   Principal Problem:  Gangrene of left foot [I96]                 Active Insurance as of 2018     Primary Coverage     Payor Plan Insurance Group Employer/Plan Group    HUMANA MEDICARE REPLACEMENT HUMANA MEDICARE REPL [id number]     Payor Plan Address Payor Plan Phone Number Effective From Effective To    [address] [address] [phone] 2018     [facility] [address]       Subscriber Name Subscriber Birth Date Member ID       YASMINE LEE 1960 [id number]                 Emergency Contacts      (Rel.) Home Phone Work Phone Mobile Phone    Deirdre Lee (Spouse) [phone] -- --    Meri Farias -- -- [phone]               History & Physical      Marcel Fagan MD at 2018 10:28 AM              [facility] Medicine Services  HISTORY AND PHYSICAL    Patient Name: Yasmine Lee  : 1960  MRN: [id number]  Primary Care Physician: ALETA Plummer    Subjective   Subjective     Chief Complaint:  Foot pain    HPI:  Yasmine Lee is a 58 y.o. male with a history of coronary artery disease, peripheral arterial disease, type 2 diabetes on oral hypoglycemics, and ongoing tobacco abuse who presented to " "[place] with complaints of left foot pain.  He has had increasing pain and duskiness to his left midfoot into his toes.  Previous first and fourth toe amputations.  He has had some serous drainage but no purulence.  Associated with redness and warmth.  Apparently 2 weeks ago at [facility] he had stenting done in his left leg but cannot give me full details.  He said the first stent \"collapsed\" and then a second stent was placed.  After that he said he had 6 stents placed across vessels in his abdomen, it is unclear what he could be referring to.  It is also unclear if his taking any antiplatelets.  In the emergency room he was found to have evidence of necrotic toes and areas on his foot.  He has transferred to [facility] for more definitive management.  The patient is understanding he is likely to need amputation and is willing to proceed if needed.    Review of Systems   Constitutional: Negative for fever.   Respiratory: Positive for shortness of breath.    Cardiovascular: Negative for chest pain.   Gastrointestinal: Negative for abdominal pain.   Musculoskeletal: Positive for arthralgias.   All other systems reviewed and are negative.         Otherwise 10-system ROS reviewed and is negative except as mentioned in the HPI.    Personal History     Past Medical History:   Diagnosis Date   • Diabetes mellitus    • Gout    • Hypertension        Past Surgical History:   Procedure Laterality Date   • AMPUTATION FOOT / TOE      amputation of toes to left foot       Family History: Positive for diabetes.    Social History:  reports that he has been smoking.  He does not have any smokeless tobacco history on file.  Social History     Social History Narrative   • No narrative on file   Smokes half pack to a pack a day.  Denies any alcohol use.  Currently lives at home.    Medications:  Prescriptions Prior to Admission   Medication Sig Dispense Refill Last Dose   • allopurinol (ZYLOPRIM) 300 MG tablet " Take 300 mg by mouth Daily.      • amLODIPine (NORVASC) 10 MG tablet Take 10 mg by mouth Daily.      • atorvastatin (LIPITOR) 20 MG tablet Take 20 mg by mouth Daily.      • carvedilol (COREG) 25 MG tablet Take 25 mg by mouth 2 (Two) Times a Day With Meals.      • CloNIDine (CATAPRES) 0.1 MG tablet Take 0.1 mg by mouth 2 (Two) Times a Day.      • fenofibrate (TRICOR) 145 MG tablet Take 145 mg by mouth Daily.      • ibuprofen (ADVIL,MOTRIN) 800 MG tablet Take 800 mg by mouth Every 6 (Six) Hours As Needed for Mild Pain .      • lisinopril (PRINIVIL,ZESTRIL) 40 MG tablet Take 40 mg by mouth Daily.      • metFORMIN (GLUCOPHAGE) 1000 MG tablet Take 1,000 mg by mouth 2 (Two) Times a Day With Meals.      • nitroglycerin (NITROSTAT) 0.4 MG SL tablet Place 0.4 mg under the tongue Every 5 (Five) Minutes As Needed for Chest Pain. Take no more than 3 doses in 15 minutes.      • rOPINIRole (REQUIP) 1 MG tablet Take 1 mg by mouth Every Night. Take 1 hour before bedtime.          No Known Allergies    Objective   Objective     Vital Signs:   Temp:  [97.8 °F (36.6 °C)-98.9 °F (37.2 °C)] 98.6 °F (37 °C)  Heart Rate:  [82-92] 91  Resp:  [18] 18  BP: (130-163)/() 143/76        Physical Exam   Constitutional: No acute distress, awake, alert, appears older than age  HENT: NCAT, mucous membranes moist, poor dentition  Respiratory: diminished but clear, respiratory effort normal   Cardiovascular: RRR, no murmurs, rubs, or gallops  Gastrointestinal: Positive bowel sounds, soft, nontender, nondistended  Musculoskeletal: left leg with previous 1st, 4th amputations.  2/3rd toes with purple discoloration.  Redness to mid-foot.  Also area or necrosis to lateral side  Psychiatric: Appropriate affect, cooperative  Neurologic: Oriented x 3, strength symmetric in all extremities, Cranial Nerves grossly intact to confrontation, speech clear  Skin: No rashes    Results Reviewed:  I have personally reviewed current lab, radiology, and data and  agree.      Results from last 7 days  Lab Units 05/11/18  0116   WBC 10*3/mm3 9.85   HEMOGLOBIN g/dL 11.1*   HEMATOCRIT % 34.6*   PLATELETS 10*3/mm3 275       Results from last 7 days  Lab Units 05/11/18  0116   SODIUM mmol/L 138   POTASSIUM mmol/L 3.6   CHLORIDE mmol/L 108   CO2 mmol/L 20.3*   BUN mg/dL 34*   CREATININE mg/dL 1.23   GLUCOSE mg/dL 199*   CALCIUM mg/dL 10.8*   ALT (SGPT) U/L 83*   AST (SGOT) U/L 68*     Estimated Creatinine Clearance: 63.8 mL/min (by C-G formula based on SCr of 1.23 mg/dL).  Brief Urine Lab Results     None        BNP   Date Value Ref Range Status   05/11/2018 5.0 0.0 - 100.0 pg/mL Final     No results found for: PHART  Imaging Results (last 24 hours)     ** No results found for the last 24 hours. **             Assessment/Plan   Assessment / Plan     Hospital Problem List     * (Principal)Gangrene of left foot    Cellulitis of left lower extremity    PAD (peripheral artery disease)    Diabetes mellitus, type 2    Tobacco abuse    Renal insufficiency            Assessment & Plan:  58-year-old male with history of peripheral arterial disease with previous left toe amputations presents with increasing pain and evidence of gangrene of his left foot and toes.  Apparently recently had some stenting done to the left lower extremity at [facility], we will try to obtain those records to determine degree of disease.  We'll start empiric antibiotics with vancomycin and Zosyn.  We'll have infectious disease help guide antibiotic therapy.  We'll consult Dr. Carreno to evaluate for possible amputation.  The patient understands and is agreeable to proceed if needed.  Place him on aspirin, increase his statin to 80 mg of Lipitor.  Replacement signed scale insulin and checking a when A1c.  We'll need good glucose control for wound healing.  Counseled on tobacco abuse (nicotine patch.  Creatinine is elevated to 1.2 with a GFR of 60 suspect this is chronic but will recheck in the  morning.    DVT prophylaxis: heparin    CODE STATUS:  Full Code    Admission Status:  I believe this patient meets INPATIENT status due to the need for care which can only be reasonably provided in an hospital setting such as aggressive/expedited ancillary services and/or consultation services, the necessity for IV medications, close physician monitoring and/or the possible need for procedures.  In such, I feel patient’s risk for adverse outcomes and need for care warrant INPATIENT evaluation and predict the patient’s care encounter to likely last beyond 2 midnights.      Electronically signed by Marcel Fagan MD, 05/11/18, 10:28 AM.          Electronically signed by Marcel Fagan MD at 5/11/2018 10:41 AM          Operative/Procedure Notes (most recent note)      Danie Carreno MD at 5/14/2018  3:52 PM        Procedure Note    Patient Name:  Sandoval Hernandez    Date of Surgery: 05/14/2018      Pre-op Diagnosis: Ischemic gangrene left secondary to severe peripheral vascular disease    Post-op Diagnosis:   Same    Surgeon(s):  Danie Carreno MD    Assistant(s) Yuniel Christina PA-C:    Anesthesia: Regional popliteal nerve block with intravenous induction of general LMA anesthesia    Indications: Patient transferred from [facility] facility with severe wet gangrene of the left lower extremity involving the left foot.  Recently the patient had endovascular intervention of the left leg to include left iliac stenting and apparently thinning of the  superficial femoral artery.  Following his procedures he has developed ischemic gangrene of the left lower extremity and on transfer to our facility it was a nonsalvageable situation of the left foot.  We discussed the need for left below knee amputation with the patient: the risk of bleeding and infection, stroke, heart attack and death as well as possible postop phantom pain were all discussed with the patient and he understood and agreed to proceed ahead with left below-knee  amputation.    Procedure(s): Left below-knee amputation    The patient had placement of a popliteal nerve catheter in the left lower extremity per Dr. Brooks of anesthesia and then was taken the room and underwent intravenous induction of general anesthesia with LMA.  Timeout was called to verify the procedure to be performed, that being a left below-knee amputation.  After verification the patient was prepped from the toes to the upper thigh of the left leg with Hibiclens solution and was sterilely draped.  A anterior skin flap was made in the left lower extremity approximately 4 fingerbreadths below the tibial tuberosity extended medially and laterally in to the calf to the midline.  The incisions were then directed inferiorly on both the medial and lateral aspects of the calf in the midline down to the ankle where posteriorly these 2 incisions were met.  The anterior  flap was developed by dividing the anterior compartment muscles down to the anterior tibial vessels which were then ligated with 2-0 silk suture proximal and distally.  A transfixion stitch of 2.0 silk was also used to further secure this hemostasis.  Laterally in the calf the the fibula was identified and the periosteum was elevated off the fibula approximately 2 cm above the anterior skin flap incision and was divided with the double-action bone cutter.  The tibia was then divided with the bone power saw at the level of the skin incision of the anterior flap.  The posterior flap was completed by using amputation knife under the fibula and the tibia and dividing the musculature of the posterior flap down to the ankle level where the skin incisions were then met posteriorly with amputation knife and the specimen was removed from the operative field.  The posterior tibial vessels and the peroneal tibial vessels were identified and were ligated with 2-0 silk suture and a transfixion stitch of 2-0 silk.  The soleus was then dissected free from the  gastrocnemius fascia with blunt and sharp dissection and the soleus was then divided at the anterior skin flap level with the Bovie cautery and removed from the operative field.  After hemostasis was controlled with Bovie cautery the entire open stump was then irrigated with 3 L of orthopedic antibiotic solution with a Pulsavac fashion.  The posterior skin flap was then tailored to the proper length along with the gastrocnemius fascia and the gastrocnemius fascia was then sutured to the periosteum of the tibia after the tibia had been beveled on its anterior portion with the bone power saw and rasp to smooth the edges.  The gastrocnemius fascia was sutured to the  anterior tibial periosteum with interrupted 2-0 Vicryl sutures.  Laterally and medially the gastrocnemius fascia was sutured to the muscle fascia of the anterior compartment with interrupted 2-0 Vicryl sutures.  A 15 Surinamese round Santi-Freitas drain was placed under the gastrocnemius fascia prior to approximating the posterior flap to the anterior flap.  At the exit at the skin laterally the drain was anchored with a 2-0 silk and was attached to a bulb vacuum reservoir..  The subcutaneous layer of the posterior flap was sutured to the subcutaneous tissue of anterior flap with interrupted 3-0 Vicryl sutures and the skin was reapproximated with interrupted 4-0 nylon sutures.  The incision was covered with Adaptic gauze followed by 4 x 4 fluffs and Kerlix wraps and #8 Spandage tube net dressing to hold the dressing in place.  The bulb vacuum reservoir was placed in a small pouch and attached to the Spandage dressing with the Velcro attachment.  The patient was awakened and the LMA was removed and the patient was taken to the recovery room with stable vital signs.  Blood loss was approximately 200 mL's, no complications occurred, and sponge and needle count were correct ×2.    Estimated Blood Loss 200 mL's    Findings: The musculature of the posterior and  anterior flap had good bleeding and there was no evidence of any subcutaneous infection noted or edema within the musculature.  The skin edges of the incision are viable    Complications: No immediate complications occurred      Danie Carreno MD     Date: 5/14/2018 Time: 5:51 PM    Electronically signed by Danie Carreno MD at 5/14/2018  6:13 PM          Physician Progress Notes (last 72 hours) (Notes from 5/13/2018  3:09 PM through 5/16/2018  3:09 PM)      Danie Carreno MD at 5/16/2018  9:11 AM          Cardiothoracic Surgery Progress Note      POD #: 2-left below-knee amputation      LOS: 5 days      Subjective: Patient's awake and alert    Chief Complaint minimal left below-knee amputation stump    Objective:  Vital Signs  Temp:  [97.9 °F (36.6 °C)-99.4 °F (37.4 °C)] 99.4 °F (37.4 °C)  Heart Rate:  [60-76] 75  Resp:  [14-18] 16  BP: (126-152)/(53-81) 152/81    Physical Exam:   General Appearance: Oriented ×3   Lungs:   Heart:   Skin:   Incision:   Left below-knee amputation stump dressing changed and the JAREN drain was discontinued.  Skin sutures are intact, anterior posterior skin flaps are viable, and the incision site is healing  Results:    Results from last 7 days  Lab Units 05/15/18  0658   WBC 10*3/mm3 7.92   HEMOGLOBIN g/dL 9.5*   HEMATOCRIT % 30.0*   PLATELETS 10*3/mm3 216       Results from last 7 days  Lab Units 05/15/18  0658   SODIUM mmol/L 139   POTASSIUM mmol/L 3.6   CHLORIDE mmol/L 108   CO2 mmol/L 24.0   BUN mg/dL 16   CREATININE mg/dL 0.80   GLUCOSE mg/dL 100   CALCIUM mg/dL 9.5         Assessment:#1.  Post operative day 2 left below-knee amputation secondary to severe peripheral vascular disease with wet gangrene of the left foot  #2.  Diabetes mellitus      Plan: Continue with daily dressing changes to the left below-knee amputation stump.  IV antibiotics to be managed by infectious disease.  Overall medical management to be managed by the hospitalist.  [facility] to place a below-knee  amputation stump protector-I have personally called [facility] and talked to Gregory  personally about this .      Danie Carreno MD - 18 - 9:11 AM          Electronically signed by Danie Carreno MD at 2018  9:15 AM     Kacey Irving MD at 5/15/2018 10:55 AM              [facility] Medicine Services  PROGRESS NOTE    Patient Name: Sandoval Hernandez  : 1960  MRN: [id number]    Date of Admission: 2018  Length of Stay: 4  Primary Care Physician: ALETA Plummer    Subjective   Subjective     CC: left foor gangrene    HPI:No acute events overnight, patient states that he is doing ok, pain is well controlled, he has no new complaint.    Review of Systems  Gen- No fevers, chills  CV- No chest pain, palpitations  Resp- No cough, dyspnea  GI- No N/V/D, abd pain    Otherwise ROS is negative except as mentioned in the HPI.    Objective   Objective     Vital Signs:   Temp:  [97.3 °F (36.3 °C)-98.9 °F (37.2 °C)] 97.8 °F (36.6 °C)  Heart Rate:  [] 102  Resp:  [18-20] 20  BP: ()/(36-79) 131/74      Physical Exam  Constitutional: No acute distress, awake, alert, comfortable  HENT: NCAT, mucous membranes moist  Respiratory: Clear to auscultation bilaterally, respiratory effort normal   Cardiovascular: RRR, no murmurs, rubs, or gallops, palpable pedal pulses bilaterally  Gastrointestinal: Positive bowel sounds, soft, nontender, nondistended  Musculoskeletal: No bilateral ankle edema, left BKA, stump in dressing, JAREN drain inplace  Psychiatric: Appropriate affect, cooperative  Neurologic: Oriented x 3, strength symmetric in all extremities, Cranial Nerves grossly intact to confrontation, speech clear  Skin: No rashes    Results Reviewed:  I have personally reviewed current lab, radiology, and data and agree.      Results from last 7 days  Lab Units 05/15/18  0658 18  0116   WBC 10*3/mm3 7.92 9.85   HEMOGLOBIN g/dL 9.5* 11.1*   HEMATOCRIT % 30.0* 34.6*   PLATELETS  10*3/mm3 216 275       Results from last 7 days  Lab Units 05/15/18  0658 05/12/18  0536 05/11/18  0116   SODIUM mmol/L 139 141 138   POTASSIUM mmol/L 3.6 4.3 3.6   CHLORIDE mmol/L 108 107 108   CO2 mmol/L 24.0 25.0 20.3*   BUN mg/dL 16 18 34*   CREATININE mg/dL 0.80 0.80 1.23   GLUCOSE mg/dL 100 118* 199*   CALCIUM mg/dL 9.5 9.3 10.8*   ALT (SGPT) U/L  --   --  83*   AST (SGOT) U/L  --   --  68*     Estimated Creatinine Clearance: 98.7 mL/min (by C-G formula based on SCr of 0.8 mg/dL).  No results found for: BNP  No results found for: PHART    Microbiology Results Abnormal     Procedure Component Value - Date/Time    Wound Culture - Wound, Foot [[id number]]  (Normal) Collected:  05/11/18 1111    Lab Status:  Final result Specimen:  Wound from Foot Updated:  05/13/18 1055     Wound Culture No growth at 2 days     Gram Stain Result No WBCs or organisms seen        I have reviewed the medications.    Assessment/Plan   Assessment / Plan     Hospital Problem List     * (Principal)Gangrene of left foot    Cellulitis of left lower extremity    PAD (peripheral artery disease)    Diabetes mellitus, type 2    Tobacco abuse    Renal insufficiency    Hypertension         Brief Hospital Course to date:  Sandoval Hernandez is a 58 y.o. male with a history of type 2 diabetes, ongoing tobacco abuse, renal specialist and see, peripheral arterial disease who presented to [facility] with a gangrenous left foot.  Records from [facility] indicate he was just discharged later on May 8 and during that stay had a stenting of his left common iliac artery and left superior femoral artery.  He also required repeat intervention to the left superficial femoral artery stent due to fracture.  After discharge with continued pain and gangrene and is transferred to [place] for further evaluation.     Assessment & Plan:  - s/p left BKA 5/14 with Dr. Carreno, continue zosyn and doxy per ID  - continue ASA and will resume plavix, continue increased  statin dose.  - a1c 7.3% , currently acceptable glc on current insulin.  - continue nicotine patch, counseled regarding need for cessation  - Cr down to 0.8 from 1.2 in [place]  - will likely need rehab after his BKA     DVT Prophylaxis: [facility]     CODE STATUS: Full Code     Disposition: I expect the patient to be discharged TBD    Electronically signed by Kacey Irving MD, 05/15/18, 10:58 AM.      Electronically signed by Kacey Irving MD at 5/15/2018 10:59 AM     Danie Carreno MD at 5/15/2018  9:19 AM          Cardiothoracic Surgery Progress Note    -POD #: 1-left below-knee amputation     LOS: 4 days      Subjective: Awake and alert    Chief Complaint: Left below  knee amputation stump pain    Objective:  Vital Signs vital signs below noted  Temp:  [97.3 °F (36.3 °C)-98.9 °F (37.2 °C)] 97.8 °F (36.6 °C)  Heart Rate:  [] 102  Resp:  [18-20] 20  BP: ()/(36-79) 131/74    Physical Exam:   General Appearance: Oriented ×3   Lungs:   Heart:   Skin:   Incision:   Stump incision dry and intact.  JAREN drain 85 ML's postop  Results: Laboratory results below are noted    Results from last 7 days  Lab Units 05/15/18  0658   WBC 10*3/mm3 7.92   HEMOGLOBIN g/dL 9.5*   HEMATOCRIT % 30.0*   PLATELETS 10*3/mm3 216       Results from last 7 days  Lab Units 05/15/18  0658   SODIUM mmol/L 139   POTASSIUM mmol/L 3.6   CHLORIDE mmol/L 108   CO2 mmol/L 24.0   BUN mg/dL 16   CREATININE mg/dL 0.80   GLUCOSE mg/dL 100   CALCIUM mg/dL 9.5         Assessment:#1.  Postop day 1 left below-knee amputation    #2 left below-knee amputation secondary to gangrene with diabetes mellitus and severe peripheral vascular disease    Plan: We will change surgical dressing tomorrow.  Intravenous antibiotics per infectious disease.  Overall medical management per hospitalist.  We will ask [facility] orthopedics to see for a below-knee amputation stump protector      Danie Carreno MD - 05/15/18 - 9:19 AM          Electronically signed by Danie Carreno MD  at 5/15/2018  9:22 AM     Dc Coates MD at 5/15/2018  8:00 AM          Sandoval Hernandez  1960  [id number]  5/15/2018    CC: Redness, pain, and swelling of left lower extremity    Sandoval Hernandez is a 58 y.o. male here for severe peripheral arterial disease, wet gangrene, poorly controlled diabetes mellitus.      Past medical history:  Past Medical History:   Diagnosis Date   • Diabetes mellitus    • Gout    • Hypertension        Medications:   Current Facility-Administered Medications:   •  allopurinol (ZYLOPRIM) tablet 300 mg, 300 mg, Oral, Daily, Marcel Fagan MD, 300 mg at 05/14/18 0844  •  amLODIPine (NORVASC) tablet 10 mg, 10 mg, Oral, Daily, Marcel Fagan MD, 10 mg at 05/14/18 0841  •  aspirin chewable tablet 324 mg, 324 mg, Oral, Daily, Marcel Fagan MD, 324 mg at 05/14/18 0841  •  atorvastatin (LIPITOR) tablet 80 mg, 80 mg, Oral, Nightly, Marcel Fagan MD, 80 mg at 05/14/18 2053  •  bisacodyl (DULCOLAX) suppository 10 mg, 10 mg, Rectal, Daily PRN, SHAMIR Emanuel  •  carvedilol (COREG) tablet 25 mg, 25 mg, Oral, BID With Meals, Marcel Fagan MD, 25 mg at 05/14/18 0841  •  CloNIDine (CATAPRES) tablet 0.1 mg, 0.1 mg, Oral, Q12H, Marcel Fagan MD, 0.1 mg at 05/14/18 2053  •  dextrose (D50W) solution 25 g, 25 g, Intravenous, Q15 Min PRN, Marcel Fagan MD  •  dextrose (GLUTOSE) oral gel 15 g, 15 g, Oral, Q15 Min PRN, Marcel Fagan MD  •  doxycycline (MONODOX) capsule 100 mg, 100 mg, Oral, Q12H, Dc Coates MD, 100 mg at 05/14/18 2053  •  glucagon (human recombinant) (GLUCAGEN DIAGNOSTIC) injection 1 mg, 1 mg, Subcutaneous, PRN, Marcel Fagan MD  •  heparin (porcine) 5000 UNIT/ML injection 5,000 Units, 5,000 Units, Subcutaneous, Q12H, SHAMIR Emanuel  •  insulin lispro (humaLOG) injection 0-9 Units, 0-9 Units, Subcutaneous, 4x Daily With Meals & Nightly, Marcel Fagan MD, 2 Units at 05/12/18 1146  •  lisinopril (PRINIVIL,ZESTRIL) tablet 40 mg, 40 mg, Oral, Daily, Marcel Fagan MD,  40 mg at 05/14/18 0841  •  Morphine sulfate (PF) injection 2 mg, 2 mg, Intravenous, Q4H PRN, Danie Carreno MD  •  nicotine (NICODERM CQ) 21 MG/24HR patch 1 patch, 1 patch, Transdermal, Daily, Marcel Fagan MD, 1 patch at 05/12/18 0830  •  ondansetron (ZOFRAN) tablet 4 mg, 4 mg, Oral, Q6H PRN **OR** ondansetron (ZOFRAN) injection 4 mg, 4 mg, Intravenous, Q6H PRN, SHAMIR Emanuel  •  oxyCODONE-acetaminophen (PERCOCET) 5-325 MG per tablet 1 tablet, 1 tablet, Oral, Q4H PRN, Marcel Fagan MD, 1 tablet at 05/15/18 0743  •  piperacillin-tazobactam (ZOSYN) 4.5 g in iso-osmotic dextrose 100 mL IVPB (premix), 4.5 g, Intravenous, Q8H, Marcel Fagan MD, 4.5 g at 05/15/18 0037  •  rOPINIRole (REQUIP) tablet 1 mg, 1 mg, Oral, Nightly, Marcel Fagan MD, 1 mg at 05/14/18 2053  •  ropivacaine (NAROPIN) 0.2% peripheral nerve cath (moog), 6 mL/hr, Peripheral Nerve, Continuous, Rafat Brooks MD, Last Rate: 14 mL/hr at 05/15/18 0740, 14 mL/hr at 05/15/18 0740  •  sennosides-docusate sodium (SENOKOT-S) 8.6-50 MG tablet 2 tablet, 2 tablet, Oral, BID PRN, SHAMIR Emanuel  •  sodium chloride 0.45 % infusion, 35 mL/hr, Intravenous, Continuous, SHAMIR Emanuel, Last Rate: 35 mL/hr at 05/14/18 1855, 35 mL/hr at 05/14/18 1855  •  sodium chloride 0.9 % flush 1-10 mL, 1-10 mL, Intravenous, PRN, Marcel Fagan MD  •  Tdap (BOOSTRIX) injection 0.5 mL, 0.5 mL, Intramuscular, During Hospitalization, Dc Coates MD  Antibiotics:  Anti-Infectives     Ordered     Dose/Rate Route Frequency Start Stop    05/14/18 1746  cefuroxime (ZINACEF) 1.5 g/100 mL 0.9% NS IVPB (mbp)     Ordering Provider:  SHAMIR Emanuel    1.5 g  200 mL/hr over 30 Minutes Intravenous Every 8 Hours 05/14/18 2300 05/15/18 0608    05/14/18 1357  cefuroxime (ZINACEF) 1.5 g/100 mL 0.9% NS IVPB (mbp)     Ordering Provider:  SHAMIR Gaston    1.5 g  over 30 Minutes Intravenous 60 Minutes Pre-Op 05/14/18 1357 05/14/18 1532    05/11/18 1341  doxycycline  "(MONODOX) capsule 100 mg     Ordering Provider:  Dc Coates MD    100 mg Oral Every 12 Hours Scheduled 05/11/18 1800 05/18/18 2059    05/11/18 1026  piperacillin-tazobactam (ZOSYN) 4.5 g in iso-osmotic dextrose 100 mL IVPB (premix)     Ordering Provider:  Marcel Fagan MD    4.5 g  over 4 Hours Intravenous Every 8 Hours 05/11/18 1700 05/18/18 1659    05/11/18 1026  piperacillin-tazobactam (ZOSYN) 4.5 g in iso-osmotic dextrose 100 mL IVPB (premix)     Ordering Provider:  Marcel Fagan MD    4.5 g  over 30 Minutes Intravenous Once 05/11/18 1100 05/11/18 1254          Allergies:  has No Known Allergies.    Review of Systems: All other reviewed and negative except as per HPI    Blood pressure 160/76, pulse 90, temperature 97.9 °F (36.6 °C), temperature source Oral, resp. rate 18, height 162.6 cm (64.02\"), weight 84.4 kg (186 lb), SpO2 96 %.  GENERAL: Awake and alert, in no acute distress. Denies fever chills or night sweats.  No cough or purulent sputum production.  Some phantom pain overnight but minimal.  HEENT: Oropharynx without thrush. Sinuses nontender. Dentition in poor repair. No cervical adenopathy. No carotid bruits/ jugular venous distention.   EYES: PERRL. No conjunctival injection. No icterus. EOMI.  LYMPHATICS: No lymphadenopathy of the neck or axillary or inguinal regions.   HEART: No murmur, gallop, or pericardial friction rub.   LUNGS: Clear to auscultation anteriorly. No percussion dullness.   ABDOMEN: Soft, nontender, nondistended. No appreciable HSM.    SKIN: Warm and dry without cutaneous eruptions. No embolic stigmata.  Left BKA dressing with scant serosanguineous drainage.  PSYCHIATRIC: Mental status lucid. Cranial nerve function intact.       DIAGNOSTICS:  Lab Results   Component Value Date    WBC 9.85 05/11/2018    HGB 11.1 (L) 05/11/2018    HCT 34.6 (L) 05/11/2018     05/11/2018     Lab Results   Component Value Date    CRP 17.26 (H) 05/11/2018     Lab Results   Component Value " Date    SEDRATE 36 (H) 05/11/2018     Lab Results   Component Value Date    GLUCOSE 118 (H) 05/12/2018    BUN 18 05/12/2018    CREATININE 0.80 05/12/2018    EGFRIFNONA 99 05/12/2018    BCR 22.5 05/12/2018    CO2 25.0 05/12/2018    CALCIUM 9.3 05/12/2018    ALBUMIN 4.60 05/11/2018    LABIL2 1.3 (L) 05/11/2018    AST 68 (H) 05/11/2018    ALT 83 (H) 05/11/2018       Microbiology:Admission blood cultures ×2 negative.  Swab cultures from ruptured bulla on dorsal left foot negative.    RADIOLOGY:  Imaging Results (last 72 hours)     Procedure Component Value Units Date/Time    XR Toe 2+ View Left [[id number]] Collected:  05/11/18 2033     Updated:  05/13/18 1432    Narrative:          EXAMINATION: XR TOE 2+ VW LEFT - 05/11/2018     INDICATION:  [id number], not elsewhere classified.     COMPARISON: None.     FINDINGS: Status post amputations at the level of the base of the  proximal phalanx first and fourth digits with cortical margins fairly  well-preserved. No evidence for definitive osseous erosion or soft  tissue gas. Remaining non amputated digits grossly unremarkable with  preserved joint spaces. No acute fracture.           Impression:       Post amputation changes first and fourth digits without  evidence for osseous erosion or lucency to suggest osteolytic change. No  soft tissue emphysema. Further imaging may be useful if symptoms persist  and/or progress to fully evaluate for underlying osteomyelitis.     DICTATED:     05/11/2018  EDITED/ls :     05/11/2018      This report was finalized on 5/13/2018 2:30 PM by Dr. Italo Philip.             Assessment and Plan: Type 2 diabetes mellitus with insulin requirement.  Severe peripheral arterial disease.  Previous digital amputations on left foot.  Recent stent deployment to left femoral artery/presumed atheroembolic event.  Bullous cellulitis and ischemic gangrene of left foot/status post below-knee amputation.  Maximum temperature over 24 hours 98.9°.  Currently  97.9°.  Plasma creatinine 0.8.  Suggest 48-72 hours of postoperative oral doxycycline and piperacillin tazobactam.  Anticipate transfer to [facility].  I will be out of town for the next 3 days.  L IDC available if problems.      Dc Coates MD  5/15/2018      Electronically signed by Dc Coates MD at 5/15/2018  8:04 AM     ALETA Devries at 2018  9:55 AM              [facility] Medicine Services  PROGRESS NOTE    Patient Name: Sandoval Hernandez  : 1960  MRN: [id number]    Date of Admission: 2018  Length of Stay: 3  Primary Care Physician: ALETA Plummer    Subjective   Subjective     CC:  Foot gangrene    HPI:  Patient resting in bed in NAD. Pt is agreeable to surgery today. Pain controlled. Denies any soa or cp. No acute events overnight per nursing.     Review of Systems   Constitutional: Negative for appetite change and fever.   Respiratory: Negative for shortness of breath.    Cardiovascular: Negative for chest pain.   Gastrointestinal: Negative for abdominal pain.   Genitourinary: Negative for dysuria.   Musculoskeletal:        + foot pain   All other systems reviewed and are negative.        Otherwise ROS is negative except as mentioned in the HPI.    Objective   Objective     Vital Signs:   Temp:  [98.1 °F (36.7 °C)-98.7 °F (37.1 °C)] 98.5 °F (36.9 °C)  Heart Rate:  [62-80] 67  Resp:  [17-20] 20  BP: (110-160)/(59-79) 129/63        Physical Exam:  Constitutional: No acute distress, awake, alert  HENT: NCAT, mucous membranes moist, poor dentition  Respiratory: Clear to auscultation bilaterally, respiratory effort normal   Cardiovascular: RRR, no murmurs, rubs, or gallops  Gastrointestinal: Positive bowel sounds, soft, nontender, nondistended  Musculoskeletal: left foot heavily wrapped, did not examine  Psychiatric: Appropriate affect, cooperative  Neurologic: Oriented x 3, strength symmetric in all extremities,  Cranial Nerves grossly intact to confrontation, speech clear  Skin: No rashes      Results Reviewed:  I have personally reviewed current lab, radiology, and data and agree.      Results from last 7 days  Lab Units 05/11/18  0116   WBC 10*3/mm3 9.85   HEMOGLOBIN g/dL 11.1*   HEMATOCRIT % 34.6*   PLATELETS 10*3/mm3 275       Results from last 7 days  Lab Units 05/12/18  0536 05/11/18  0116   SODIUM mmol/L 141 138   POTASSIUM mmol/L 4.3 3.6   CHLORIDE mmol/L 107 108   CO2 mmol/L 25.0 20.3*   BUN mg/dL 18 34*   CREATININE mg/dL 0.80 1.23   GLUCOSE mg/dL 118* 199*   CALCIUM mg/dL 9.3 10.8*   ALT (SGPT) U/L  --  83*   AST (SGOT) U/L  --  68*     Estimated Creatinine Clearance: 98.7 mL/min (by C-G formula based on SCr of 0.8 mg/dL).  No results found for: BNP  No results found for: PHART    Microbiology Results Abnormal     Procedure Component Value - Date/Time    Wound Culture - Wound, Foot [[id number]]  (Normal) Collected:  05/11/18 1111    Lab Status:  Final result Specimen:  Wound from Foot Updated:  05/13/18 1055     Wound Culture No growth at 2 days     Gram Stain Result No WBCs or organisms seen          Imaging Results (last 24 hours)     Procedure Component Value Units Date/Time    XR Toe 2+ View Left [[id number]] Collected:  05/11/18 2033     Updated:  05/13/18 1432    Narrative:          EXAMINATION: XR TOE 2+ VW LEFT - 05/11/2018     INDICATION:  [id number], not elsewhere classified.     COMPARISON: None.     FINDINGS: Status post amputations at the level of the base of the  proximal phalanx first and fourth digits with cortical margins fairly  well-preserved. No evidence for definitive osseous erosion or soft  tissue gas. Remaining non amputated digits grossly unremarkable with  preserved joint spaces. No acute fracture.           Impression:       Post amputation changes first and fourth digits without  evidence for osseous erosion or lucency to suggest osteolytic change. No  soft tissue emphysema. Further  imaging may be useful if symptoms persist  and/or progress to fully evaluate for underlying osteomyelitis.     DICTATED:     05/11/2018  EDITED/ls :     05/11/2018      This report was finalized on 5/13/2018 2:30 PM by Dr. Italo Philip.                I have reviewed the medications.    Assessment/Plan   Assessment / Plan     Hospital Problem List     * (Principal)Gangrene of left foot    Cellulitis of left lower extremity    PAD (peripheral artery disease)    Diabetes mellitus, type 2    Tobacco abuse    Renal insufficiency    Hypertension             Brief Hospital Course to date:  Sandoval Hernandez is a 58 y.o. male with a history of type 2 diabetes, ongoing tobacco abuse, renal specialist and see, peripheral arterial disease who presented to [facility] with a gangrenous left foot.  Records from [facility] indicate he was just discharged later on May 8 and during that stay had a stenting of his left common iliac artery and left superior femoral artery.  He also required repeat intervention to the left superficial femoral artery stent due to fracture.  After discharge with continued pain and gangrene and is transferred to [place] for further evaluation.    Assessment & Plan:  - outside records reviewed with results as above  - appreciate Dr. Jaxon adan, continue zosyn and doxy  - d/w Dr. Carreno, anticipate left BKA today at 2 pm.  Patient is agreeable and wishing to proceed.  - continue ASA and will resume plavix post-op  - continue increased statin dose.  - a1c 7.3% , currently acceptable glc on current insulin.  - continue nicotine patch, counseled regarding need for cessation  - Cr down to 0.8 from 1.2 in [place]  - will likely need rehab after his BKA, will d/w CM    DVT Prophylaxis:  heparin    CODE STATUS: Full Code    Disposition: I expect the patient to be discharged TBD    Electronically signed by ALETA Devries, 05/14/18, 9:55 AM.        Electronically signed by ALETA Devries  at 5/14/2018  9:58 AM     Dc Coates MD at 5/14/2018  9:55 AM          Sandoval Hernandez  1960  [id number]  5/14/2018    CC: Wet gangrene of left lower extremity    Sandoval Hernandez is a 58 y.o. male here for type 2 diabetes mellitus, severe peripheral arterial disease, bullous cellulitis, and digital ischemia of left lower extremity..      Past medical history:  Past Medical History:   Diagnosis Date   • Diabetes mellitus    • Gout    • Hypertension        Medications:   Current Facility-Administered Medications:   •  allopurinol (ZYLOPRIM) tablet 300 mg, 300 mg, Oral, Daily, Marcel Fagan MD, 300 mg at 05/14/18 0844  •  amLODIPine (NORVASC) tablet 10 mg, 10 mg, Oral, Daily, Marcel Fagan MD, 10 mg at 05/14/18 0841  •  aspirin chewable tablet 324 mg, 324 mg, Oral, Daily, aMrcel Fagan MD, 324 mg at 05/14/18 0841  •  atorvastatin (LIPITOR) tablet 80 mg, 80 mg, Oral, Nightly, Marcel Fagan MD, 80 mg at 05/13/18 2122  •  carvedilol (COREG) tablet 25 mg, 25 mg, Oral, BID With Meals, Marcel Fagan MD, 25 mg at 05/14/18 0841  •  CloNIDine (CATAPRES) tablet 0.1 mg, 0.1 mg, Oral, Q12H, Marcel Fagan MD, 0.1 mg at 05/14/18 0841  •  dextrose (D50W) solution 25 g, 25 g, Intravenous, Q15 Min PRN, Marcel Fagan MD  •  dextrose (GLUTOSE) oral gel 15 g, 15 g, Oral, Q15 Min PRN, Marcel Fagan MD  •  doxycycline (MONODOX) capsule 100 mg, 100 mg, Oral, Q12H, Dc Coates MD, 100 mg at 05/14/18 0844  •  glucagon (human recombinant) (GLUCAGEN DIAGNOSTIC) injection 1 mg, 1 mg, Subcutaneous, PRN, Marcel Fagan MD  •  heparin (porcine) 5000 UNIT/ML injection 5,000 Units, 5,000 Units, Subcutaneous, Q8H, Marcel Fagan MD, 5,000 Units at 05/14/18 0640  •  insulin lispro (humaLOG) injection 0-9 Units, 0-9 Units, Subcutaneous, 4x Daily With Meals & Nightly, Marcel Fagan MD, 2 Units at 05/12/18 1146  •  lisinopril (PRINIVIL,ZESTRIL) tablet 40 mg, 40 mg, Oral, Daily, Marcel Fagan MD, 40 mg at 05/14/18 0841  •  nicotine  "(NICODERM CQ) 21 MG/24HR patch 1 patch, 1 patch, Transdermal, Daily, Marcel Fagan MD, 1 patch at 05/12/18 0830  •  oxyCODONE-acetaminophen (PERCOCET) 5-325 MG per tablet 1 tablet, 1 tablet, Oral, Q4H PRN, Marcel Fagan MD, 1 tablet at 05/13/18 2122  •  piperacillin-tazobactam (ZOSYN) 4.5 g in iso-osmotic dextrose 100 mL IVPB (premix), 4.5 g, Intravenous, Q8H, Marcel Fagan MD, 4.5 g at 05/14/18 0840  •  rOPINIRole (REQUIP) tablet 1 mg, 1 mg, Oral, Nightly, Marcel Fagan MD, 1 mg at 05/13/18 2122  •  sodium chloride 0.9 % flush 1-10 mL, 1-10 mL, Intravenous, PRN, Marcel Fagan MD  •  Tdap (BOOSTRIX) injection 0.5 mL, 0.5 mL, Intramuscular, During Hospitalization, Dc Coates MD  Antibiotics:  Anti-Infectives     Ordered     Dose/Rate Route Frequency Start Stop    05/11/18 1341  doxycycline (MONODOX) capsule 100 mg     Ordering Provider:  Dc Coates MD    100 mg Oral Every 12 Hours Scheduled 05/11/18 1800 05/18/18 2059 05/11/18 1026  piperacillin-tazobactam (ZOSYN) 4.5 g in iso-osmotic dextrose 100 mL IVPB (premix)     Ordering Provider:  Marcel Fagan MD    4.5 g  over 4 Hours Intravenous Every 8 Hours 05/11/18 1700 05/18/18 1659    05/11/18 1026  piperacillin-tazobactam (ZOSYN) 4.5 g in iso-osmotic dextrose 100 mL IVPB (premix)     Ordering Provider:  Marcel Fagan MD    4.5 g  over 30 Minutes Intravenous Once 05/11/18 1100 05/11/18 1254          Allergies:  has No Known Allergies.    Review of Systems: All other reviewed and negative except as per HPI    Blood pressure 129/63, pulse 67, temperature 98.5 °F (36.9 °C), temperature source Oral, resp. rate 20, height 162.6 cm (64.02\"), weight 84.4 kg (186 lb), SpO2 100 %.  GENERAL: Awake and alert, in no acute distress. Sitting on the side of the bed.  Minimal pain.  Denies fever chills or night sweats.  Anticipates left BKA at 2:00 this afternoon.  HEENT: Oropharynx without thrush. Sinuses nontender. Dentition in poor repair. No cervical " adenopathy. No carotid bruits/ jugular venous distention.   EYES: PERRL. No conjunctival injection. No icterus. EOMI.  LYMPHATICS: No lymphadenopathy of the neck or axillary or inguinal regions.   HEART: No murmur, gallop, or pericardial friction rub.   LUNGS: Clear to auscultation anteriorly. No percussion dullness.   ABDOMEN: Soft, nontender, nondistended. No appreciable HSM.    SKIN: Warm and dry without cutaneous eruptions. No embolic stigmata.  Bullous cellulitis overlying left dorsal foot with digital gangrene.  No crepitant gas in the soft tissues.  No malodorous changes.  PSYCHIATRIC: Mental status lucid. Cranial nerve function intact.       DIAGNOSTICS:  Lab Results   Component Value Date    WBC 9.85 05/11/2018    HGB 11.1 (L) 05/11/2018    HCT 34.6 (L) 05/11/2018     05/11/2018     Lab Results   Component Value Date    CRP 17.26 (H) 05/11/2018     Lab Results   Component Value Date    SEDRATE 36 (H) 05/11/2018     Lab Results   Component Value Date    GLUCOSE 118 (H) 05/12/2018    BUN 18 05/12/2018    CREATININE 0.80 05/12/2018    EGFRIFNONA 99 05/12/2018    BCR 22.5 05/12/2018    CO2 25.0 05/12/2018    CALCIUM 9.3 05/12/2018    ALBUMIN 4.60 05/11/2018    LABIL2 1.3 (L) 05/11/2018    AST 68 (H) 05/11/2018    ALT 83 (H) 05/11/2018       Microbiology:Blood cultures ×2 negative.  Wound culture unremarkable.    RADIOLOGY:  Imaging Results (last 72 hours)     Procedure Component Value Units Date/Time    XR Toe 2+ View Left [[id number]] Collected:  05/11/18 2033     Updated:  05/13/18 1432    Narrative:          EXAMINATION: XR TOE 2+ VW LEFT - 05/11/2018     INDICATION:  [id number], not elsewhere classified.     COMPARISON: None.     FINDINGS: Status post amputations at the level of the base of the  proximal phalanx first and fourth digits with cortical margins fairly  well-preserved. No evidence for definitive osseous erosion or soft  tissue gas. Remaining non amputated digits grossly unremarkable  with  preserved joint spaces. No acute fracture.           Impression:       Post amputation changes first and fourth digits without  evidence for osseous erosion or lucency to suggest osteolytic change. No  soft tissue emphysema. Further imaging may be useful if symptoms persist  and/or progress to fully evaluate for underlying osteomyelitis.     DICTATED:     05/11/2018  EDITED/ls :     05/11/2018      This report was finalized on 5/13/2018 2:30 PM by Dr. Italo Philip.             Assessment and Plan: Long-standing diabetes mellitus.  Suboptimal glycemic control.  Peripheral arterial disease.  Recent left femoral stent deployment/suspected atheroembolic event.  Wet gangrene of left foot.  BKA amputation planned for today.  Maximum temperature over 24 hours 98.7°.  Currently 98.1°.  Plasma creatinine 0.8.  Lactic acidosis resolved with current level of 1.1.  C-reactive protein is 17.  Blood and wound cultures remain negative.  Pulsation management: Currently on doxycycline and piperacillin tazobactam.  Anticipate 48-72 hours of postoperative antimicrobials followed by transfer to [facility].      Dc Coates MD  5/14/2018      Electronically signed by Dc Coates MD at 5/14/2018  9:58 AM     SHAMIR Colmenares at 5/14/2018  7:53 AM     Attestation signed by Danie Carreno MD at 5/16/2018 11:20 AM    I have reviewed the documentation above and agree.                    Cardiothoracic Surgery Progress Note           LOS: 3 days      Subjective: Awake alert     Chief Complaint: Still complains a left ischemic foot,sore, tender    Objective:  Vital Signs are signs below are noted  Temp:  [98.1 °F (36.7 °C)-98.7 °F (37.1 °C)] 98.1 °F (36.7 °C)  Heart Rate:  [62-80] 72  Resp:  [17-18] 17  BP: (110-160)/(59-79) 135/59    Physical Exam:   General Appearance: Oriented ×3   Lungs:few rhonchi   Heart:without R/M/G   Skin:warm, dry   Incision:   Left foot ulceration inspected new dressing  applied with Xeroform and Kerlix wrap  Results: Laboratory results below are noted    Results from last 7 days  Lab Units 05/11/18  0116   WBC 10*3/mm3 9.85   HEMOGLOBIN g/dL 11.1*   HEMATOCRIT % 34.6*   PLATELETS 10*3/mm3 275       Results from last 7 days  Lab Units 05/12/18  0536   SODIUM mmol/L 141   POTASSIUM mmol/L 4.3   CHLORIDE mmol/L 107   CO2 mmol/L 25.0   BUN mg/dL 18   CREATININE mg/dL 0.80   GLUCOSE mg/dL 118*   CALCIUM mg/dL 9.3         Assessment:#1.  Ischemic gangrene left foot with cellulitis secondary to severe peripheral vascular disease  #2.  Severe peripheral vas disease of both lower extremities-failed endovascular procedure of left lower leg  #3.  Diabetes mellitus with diabetic neuropathy  Plan: We will plan left below-knee amputation on today, 05/14/2018.   Patient agrees to proceed ahead.  Patient is aware that we may have to proceed to a amputation at higher level in the future if this amputation stump does not heal      SHAMIR Krishnan - 05/14/18 - 7:53 AM          Electronically signed by Danie Carreno MD at 5/16/2018 11:20 AM          Consult Notes (most recent note)      Danie Carreno MD at 5/11/2018  4:12 PM          CTS Consult    Patient Name:  Sandoval Hernandez and [id number]    Patient Care Team:  ALETA Plummer as PCP - General (Family Medicine)      Reason for Consult:  Gangrene of the left foot    HPI:  Patient is a 58 y.o. male who was admitted to [facility] earlier this morning with gangrene of his left foot which has been evolving over the past 2 weeks.  The patient has been a diabetic for many years and known peripheral vascular disease with recent endovascular procedures performed at [facility].  At the moment we do not have details of those endoscopic procedures.  Patient has known peripheral vascular disease with prior great toe and fourth toe amputations of this left foot.  Patient has been experiencing recent fever  and chills.  On admission to the hospital today he had an elevated C-reactive protein but normal white blood count and normal serum lactic acid level.  We have been asked to see the patient in regards to possible need for amputation of the left lower extremity.    History:  Past Medical History:   Diagnosis Date   • Diabetes mellitus    • Gout    • Hypertension      Past Surgical History:   Procedure Laterality Date   • AMPUTATION FOOT / TOE      amputation of toes to left foot     No family history on file.  Social History   Substance Use Topics   • Smoking status: Current Every Day Smoker   • Smokeless tobacco: Not on file   • Alcohol use Not on file     Prescriptions Prior to Admission   Medication Sig Dispense Refill Last Dose   • allopurinol (ZYLOPRIM) 300 MG tablet Take 300 mg by mouth Daily.      • amLODIPine (NORVASC) 10 MG tablet Take 10 mg by mouth Daily.      • atorvastatin (LIPITOR) 20 MG tablet Take 20 mg by mouth Daily.      • carvedilol (COREG) 25 MG tablet Take 25 mg by mouth 2 (Two) Times a Day With Meals.      • CloNIDine (CATAPRES) 0.1 MG tablet Take 0.1 mg by mouth 2 (Two) Times a Day.      • fenofibrate (TRICOR) 145 MG tablet Take 145 mg by mouth Daily.      • ibuprofen (ADVIL,MOTRIN) 800 MG tablet Take 800 mg by mouth Every 6 (Six) Hours As Needed for Mild Pain .      • lisinopril (PRINIVIL,ZESTRIL) 40 MG tablet Take 40 mg by mouth Daily.      • metFORMIN (GLUCOPHAGE) 1000 MG tablet Take 1,000 mg by mouth 2 (Two) Times a Day With Meals.      • nitroglycerin (NITROSTAT) 0.4 MG SL tablet Place 0.4 mg under the tongue Every 5 (Five) Minutes As Needed for Chest Pain. Take no more than 3 doses in 15 minutes.      • rOPINIRole (REQUIP) 1 MG tablet Take 1 mg by mouth Every Night. Take 1 hour before bedtime.        Allergies:  Review of patient's allergies indicates no known allergies.      Review of Systems:       Constitutional:Recent fever and chills.  Left foot with ulcerations developing over the  past 2 weeks        Respiratory: No history of hemoptysis chronic cough or sputum production.  No history of shortness of breath        Cardiovascular: No history of prior myocardial infarctions, angina, or heart failure        Gastrointestinal: No history of melena or hematochezia, constipation        Hematologic/lymphatic: No history of easy bruisability or bleeding       Musculoskeletal: No history of arthritis       Neurological: No history of stroke or seizures       Vascular: Prior vascular interventions endosc[facility] at [facility].  Prior left great toe and left fourth toe amputations for gangrene   Endocrine: History of diabetes for at least 10 years  Vital Signs  Temp:  [97.8 °F (36.6 °C)-98.9 °F (37.2 °C)] 98.6 °F (37 °C)  Heart Rate:  [74-92] 74  Resp:  [18] 18  BP: (111-164)/() 111/50    Physical Exam:       General Appearance: Patient's awake and alert appears somewhat disheveled       Head: Normocephalic, sclera clear       Neck: No history of bruits or palpable masses       Lungs: Clear bilaterally       Heart: Regular rhythm no murmurs heard       Abdomen: Soft good bowel sounds no masses no tenderness       Extremities: Left lower extremity has absent left great toe and left fourth toe the second third and fifth toes are cyanotic and there is a large eschar ulceration over the lateral dorsal surface of his left foot.  Erythema is also present over the dorsum of the left foot       Pulses: I could not palpate any pedal pulses in the left foot or the right foot and could not palpate pulses in the popliteal of either leg       Skin: Cyanotic appearing especially in the left foot area       Neurologic: Cranial nerves II through XII grossly intact      Data Review:    Results from last 7 days  Lab Units 05/11/18  0116   WBC 10*3/mm3 9.85   HEMOGLOBIN g/dL 11.1*   HEMATOCRIT % 34.6*   PLATELETS 10*3/mm3 275       Results from last 7 days  Lab Units 05/11/18  0116   SODIUM mmol/L 138    POTASSIUM mmol/L 3.6   CHLORIDE mmol/L 108   CO2 mmol/L 20.3*   BUN mg/dL 34*   CREATININE mg/dL 1.23   GLUCOSE mg/dL 199*   CALCIUM mg/dL 10.8*     Coagulation: No results found for: INR, APTT    Imaging Review: 2 views of the left foot reviewed.  No gas seen in the soft tissues.  No obvious osseous changes to suggest overt osteomyelitis of the toes of the left foot      Assessment: #1.  Gangrene of the left foot involving the second third and fifth toes along with soft tissue involvement of the dorsum of the left foot along the lateral surface.  This gangrenous process has apparently occurred over the past 2 weeks  #2.  Diabetes mellitus  #3.  Severe peripheral vascular disease with recent endovascular procedures on the left leg for severe rest pain.    Plan: I will plan to proceed ahead with a left below-knee amputation.  Clinically it appears to me that this left foot is severely ischemic with the above noted gangrenous process and I do not think any further vascular procedures would rectify his ongoing gangrenous process.  The patient understands this and is agreeable for me to proceed ahead.  I will plan to do this procedure on 2018      Danie Carreno MD - 18, 4:12 PM        Electronically signed by Danie Carreno MD at 2018  4:41 PM          Physical Therapy Notes (most recent note)      Jazmine Torres, PT at 5/15/2018 10:52 AM  Version 1 of 1         Acute Care - Physical Therapy Initial Evaluation  [facility]     Patient Name: Sandoval Hernandez  : 1960  MRN: [id number]  Today's Date: 5/15/2018   Onset of Illness/Injury or Date of Surgery: 18  Date of Referral to PT: 18  Referring Physician: SHAMIR Lott      Admit Date: 2018    Visit Dx:     ICD-10-CM ICD-9-CM   1. Gangrene of left foot I96 785.4   2. Impaired functional mobility, balance, gait, and endurance Z74.09 V49.89     Patient Active Problem List   Diagnosis   • Cellulitis of left lower extremity   •  Gangrene of left foot   • PAD (peripheral artery disease)   • Diabetes mellitus, type 2   • Tobacco abuse   • Renal insufficiency   • Hypertension     Past Medical History:   Diagnosis Date   • Diabetes mellitus    • Gout    • Hypertension      Past Surgical History:   Procedure Laterality Date   • AMPUTATION FOOT / TOE      amputation of toes to left foot   • BELOW KNEE AMPUTATION Left 5/14/2018    Procedure: AMPUTATION BELOW KNEE LEFT;  Surgeon: Danie Carreno MD;  Location: [facility] OR;  Service: Vascular        PT ASSESSMENT (last 12 hours)      Physical Therapy Evaluation     Row Name 05/15/18 0925          PT Evaluation Time/Intention    Subjective Information no complaints  -KM     Document Type evaluation  -KM     Mode of Treatment physical therapy  -KM     Patient Effort excellent  -KM     Row Name 05/15/18 0925          General Information    Patient Profile Reviewed? yes  -KM     Onset of Illness/Injury or Date of Surgery 05/11/18  -KM     Referring Physician SHAMIR Lott  -KM     Patient Observations alert;cooperative;agree to therapy  -KM     Prior Level of Function independent:;gait;transfer;bed mobility;ADL's  -KM     Equipment Currently Used at Home none  -KM     Pertinent History of Current Functional Problem Pt is post op day one L BKA with h/o multiple failed stents and toe amputations,cellulitis and gangrene  -KM     Existing Precautions/Restrictions fall   francoise drain, nerve catheter  -KM     Risks Reviewed patient:;LOB  -KM     Benefits Reviewed patient:;improve function  -KM     Barriers to Rehab none identified  -KM     Row Name 05/15/18 0925          Relationship/Environment    Primary Source of Support/Comfort spouse  -KM     Lives With spouse  -KM     Row Name 05/15/18 0925          Resource/Environmental Concerns    Current Living Arrangements home/apartment/condo  -KM     Resource/Environmental Concerns none  -KM     Row Name 05/15/18 0925          Home Main Entrance    Number of Stairs, Main  Entrance three  -KM     Stair Railings, Main Entrance railing on left side (ascending)  -KM     Row Name 05/15/18 0925          Cognitive Assessment/Intervention- PT/OT    Orientation Status (Cognition) oriented x 4  -KM     Row Name 05/15/18 0925          Bed Mobility Assessment/Treatment    Bed Mobility Assessment/Treatment scooting/bridging;supine-sit  -KM     [place] Level (Bed Mobility) independent  -KM     Row Name 05/15/18 0925          Transfer Assessment/Treatment    Transfer Assessment/Treatment sit-stand transfer;stand-sit transfer  -KM     Sit-Stand [place] (Transfers) contact guard  -KM     Stand-Sit [place] (Transfers) verbal cues;contact guard  -KM     Row Name 05/15/18 0925          Sit-Stand Transfer    Assistive Device (Sit-Stand Transfers) walker, front-wheeled  -KM     Row Name 05/15/18 0925          Stand-Sit Transfer    Assistive Device (Stand-Sit Transfers) walker, front-wheeled  -KM     Row Name 05/15/18 0925          Gait/Stairs Assessment/Training    Gait/Stairs Assessment/Training gait/ambulation independence   cues not to step beyond front of wx  -KM     [place] Level (Gait) contact guard;1 person to manage equipment  -KM     Assistive Device (Gait) walker, front-wheeled  -KM     Distance in Feet (Gait) 75  -KM     Pattern (Gait) step-to  -KM     Row Name 05/15/18 0925          General ROM    GENERAL ROM COMMENTS --   B l/e wfls, 0 degree L knee ext  -KM     Row Name 05/15/18 0925          General Assessment (Manual Muscle Testing)    General Manual Muscle Testing (MMT) Assessment no strength deficits identified  -KM     Row Name 05/15/18 0925          Motor Assessment/Intervention    Additional Documentation Balance (Group);Therapeutic Exercise (Group)  -KM     Row Name 05/15/18 0925          Therapeutic Exercise    Therapeutic Exercise supine, lower extremities  -KM     Additional Documentation Therapeutic Exercise (Row)  -KM     Row Name 05/15/18 0925          Lower  Extremity Supine Therapeutic Exercise    Performed, Supine Lower Extremity (Therapeutic Exercise) SLR (straight leg raise)   L l/e QS  -[facility] Name 05/15/18 0925          Balance    Balance dynamic sitting balance;static standing balance  -[facility] Name 05/15/18 0925          Dynamic Sitting Balance    Level of [place], Reaches Outside Midline (Sitting, Dynamic Balance) supervision  -KM     Sitting Position, Reaches Outside Midline (Sitting, Dynamic Balance) sitting on edge of bed  -[facility] Name 05/15/18 0925          Static Standing Balance    Level of [place] (Supported Standing, Static Balance) contact guard assist  -     Time Able to Maintain Position (Supported Standing, Static Balance) 30 to 45 seconds  -     Assistive Device Utilized (Supported Standing, Static Balance) rolling walker  -[facility] Name 05/15/18 0925          Pain Assessment    Additional Documentation Pain Scale: Numbers Pre/Post-Treatment (Group)  -KM     Row Name 05/15/18 0925          Pain Scale: Numbers Pre/Post-Treatment    Pain Scale: Numbers, Pretreatment 0/10 - no pain  -     Pain Scale: Numbers, Post-Treatment 0/10 - no pain  -     Row Name             Wound 05/12/18 0800 Right foot other (see comments)    Wound - Properties Group Date first assessed: 05/12/18  -RY Time first assessed: 0800  -RY Present On Admission : yes  -RY Side: Right  -RY Location: foot  -RY Type: other (see comments)  -RY, gangrene     [facility] Name             Wound 05/14/18 1720 Left leg incision    Wound - Properties Group Date first assessed: 05/14/18  -BW Time first assessed: 1720  -BW Side: Left  -BW Location: leg  -BW Type: incision  -BW    Row Name 05/15/18 0925          Plan of Care Review    Plan of Care Reviewed With patient  -KM     Row Name 05/15/18 0925          Physical Therapy Clinical Impression    Date of Referral to PT 05/14/18  -     PT Diagnosis (PT Clinical Impression) impaired mobility  -     Patient/Family Goals  Statement (PT Clinical Impression) regain independent function  -KM     Criteria for Skilled Interventions Met (PT Clinical Impression) yes  -KM     Rehab Potential (PT Clinical Summary) good, to achieve stated therapy goals  -KM     Care Plan Review (PT) evaluation/treatment results reviewed;care plan/treatment goals reviewed;risks/benefits reviewed;patient/other agree to care plan  -KM     Row Name 05/15/18 0925          Physical Therapy Goals    Bed Mobility Goal Selection (PT) bed mobility, PT goal 1  -KM     Transfer Goal Selection (PT) transfer, PT goal 1  -KM     Gait Training Goal Selection (PT) gait training, PT goal 1  -KM     Stairs Goal Selection (PT) stairs, PT goal 1  -KM     Additional Documentation Stairs Goal Selection (PT) (Row)  -KM     Row Name 05/15/18 0925          Bed Mobility Goal 1 (PT)    Activity/Assistive Device (Bed Mobility Goal 1, PT) bed mobility activities, all  -KM     [place] Level/Cues Needed (Bed Mobility Goal 1, PT) independent  -KM     Time Frame (Bed Mobility Goal 1, PT) 10 days  -KM     Row Name 05/15/18 0925          Transfer Goal 1 (PT)    Activity/Assistive Device (Transfer Goal 1, PT) sit-to-stand/stand-to-sit;bed-to-chair/chair-to-bed;walker, rolling  -KM     [place] Level/Cues Needed (Transfer Goal 1, PT) independent  -KM     Time Frame (Transfer Goal 1, PT) 10 days  -KM     Row Name 05/15/18 0925          Gait Training Goal 1 (PT)    Activity/Assistive Device (Gait Training Goal 1, PT) gait (walking locomotion);walker, rolling  -KM     [place] Level (Gait Training Goal 1, PT) independent  -KM     Distance (Gait Goal 1, PT) 100  -KM     Time Frame (Gait Training Goal 1, PT) 10 days  -KM     Row Name 05/15/18 0925          Stairs Goal 1 (PT)    Activity/Assistive Device (Stairs Goal 1, PT) ascending stairs;descending stairs;using handrail;crutches, axillary  -KM     [place] Level/Cues Needed (Stairs Goal 1, PT) independent  -KM     Number of Stairs  (Stairs Goal 1, PT) 3  -KM     Time Frame (Stairs Goal 1, PT) 10 days  -KM     Row Name 05/15/18 0925          Patient Education Goal (PT)    Activity (Patient Education Goal, PT) Independent in BKA positioning and HEP  -KM     Time Frame (Patient Education Goal, PT) 10 days  -KM     Row Name 05/15/18 0925          Positioning and Restraints    Pre-Treatment Position in bed  -KM     Post Treatment Position chair  -KM     In Chair reclined;call light within reach;encouraged to call for assist  -KM     Row Name 05/15/18 0925          Living Environment    Home Accessibility stairs to enter home  -KM       User Key  (r) = Recorded By, (t) = Taken By, (c) = Cosigned By    Initials Name Provider Type    SARIAH Torres, PT Physical Therapist    RY Norton, RN Registered Nurse    KARSON Corral, RN Registered Nurse          Physical Therapy Education     Title: PT OT SLP Therapies (Done)     Topic: Physical Therapy (Done)     Point: Mobility training (Done)    Learning Progress Summary     Learner Status Readiness Method Response Comment Documented by    Patient Done Janice MENDOZA discussed plan of care and preprosthetic rehab  05/15/18 1048          Point: Home exercise program (Done)    Learning Progress Summary     Learner Status Readiness Method Response Comment Documented by    Patient Done Janice MENDOZA discussed plan of care and preprosthetic rehab  05/15/18 1048          Point: Body mechanics (Done)    Learning Progress Summary     Learner Status Readiness Method Response Comment Documented by    Patient Done Janice MENDOZA discussed plan of care and preprosthetic rehab  05/15/18 1048          Point: Precautions (Done)    Learning Progress Summary     Learner Status Readiness Method Response Comment Documented by    Patient Done Janice MENDOZA discussed plan of care and preprosthetic rehab  05/15/18 1048                      User Key     Initials Effective Dates Name Provider Type Discipline      06/19/15 -  Jazmine Torres, PT Physical Therapist PT                PT Recommendation and Plan  Anticipated Discharge Disposition (PT): inpatient rehab facility ([facility] consideration)  Planned Therapy Interventions (PT Eval): balance training, bed mobility training, gait training, transfer training, home exercise program  Therapy Frequency (PT Clinical Impression): daily  Outcome Summary/Treatment Plan (PT)  Anticipated Equipment Needs at Discharge (PT): front wheeled walker  Anticipated Discharge Disposition (PT): inpatient rehab facility ([facility] consideration)  Plan of Care Reviewed With: patient  Outcome Summary: Pt mobilizing well ambul 75 ft with r wx and c.g.assist.Pt motivated to participate in pre prosthetic rehab. Anticipate [facility] vs home with HHPT          Outcome Measures     Row Name 05/15/18 0925             How much help from another person do you currently need...    Turning from your back to your side while in flat bed without using bedrails? 4  -KM      Moving from lying on back to sitting on the side of a flat bed without bedrails? 4  -KM      Moving to and from a bed to a chair (including a wheelchair)? 3  -KM      Standing up from a chair using your arms (e.g., wheelchair, bedside chair)? 3  -KM      Climbing 3-5 steps with a railing? 2  -KM      To walk in hospital room? 3  -KM      AM-PAC 6 Clicks Score 19  -KM         Functional Assessment    Outcome Measure Options AM-PAC 6 Clicks Basic Mobility (PT)  -KM        User Key  (r) = Recorded By, (t) = Taken By, (c) = Cosigned By    Initials Name Provider Type     Jazmine Torres, PT Physical Therapist           Time Calculation:         PT Charges     Row Name 05/15/18 1046             Time Calculation    Start Time 0925  -KM      PT Received On 05/15/18  -KM      PT Goal Re-Cert Due Date 05/25/18  -KM         Time Calculation- PT    Total Timed Code Minutes- PT 10 minute(s)  -KM        User Key  (r) = Recorded By, (t) = Taken By, (c)  = Cosigned By    Initials Name Provider Type    KM Jazmine Torres, PT Physical Therapist          Therapy Charges for Today     Code Description Service Date Service Provider Modifiers Qty    90821974126 HC PT EVAL MOD COMPLEXITY 4 5/15/2018 Jazmine Torres, PT GP 1    82945735297 HC PT THER SUPP EA 15 MIN 5/15/2018 Jazmine Torres, PT GP 2    68033648255 HC GAIT TRAINING EA 15 MIN 5/15/2018 Jazmine Torres, PT GP 1          PT G-Codes  Outcome Measure Options: AM-PAC 6 Clicks Basic Mobility (PT)      Jazmine Torres, PT  5/15/2018         Electronically signed by Jazmine Torres PT at 5/15/2018 10:52 AM

## 2018-05-16 NOTE — PROGRESS NOTES
[facility]    Acute pain service Inpatient Progress Note    Patient Name: Sandoval Hernandez  :  1960  MRN:  [id number]        Acute Pain  Service Inpatient Progress Note:    Analgesia:Excellent  Pain Score:2/10  LOC: alert and awake  Resp Status: room air  Cardiac: VS stable  Side Effects:None  Catheter Site:clean, dressing intact and dry  Cath type: peripheral nerve cath(MOOG pump)  Infusion rate: 6ml/hr  Catheter Plan:Catheter to remain Insitu and Continue catheter infusion rate unchanged  Comments: Patient doing very well, minimal reports of pain.

## 2018-05-16 NOTE — PROGRESS NOTES
Cardiothoracic Surgery Progress Note      POD #: 2-left below-knee amputation      LOS: 5 days      Subjective: Patient's awake and alert    Chief Complaint minimal left below-knee amputation stump    Objective:  Vital Signs  Temp:  [97.9 °F (36.6 °C)-99.4 °F (37.4 °C)] 99.4 °F (37.4 °C)  Heart Rate:  [60-76] 75  Resp:  [14-18] 16  BP: (126-152)/(53-81) 152/81    Physical Exam:   General Appearance: Oriented ×3   Lungs:   Heart:   Skin:   Incision:   Left below-knee amputation stump dressing changed and the JAREN drain was discontinued.  Skin sutures are intact, anterior posterior skin flaps are viable, and the incision site is healing  Results:    Results from last 7 days  Lab Units 05/15/18  0658   WBC 10*3/mm3 7.92   HEMOGLOBIN g/dL 9.5*   HEMATOCRIT % 30.0*   PLATELETS 10*3/mm3 216       Results from last 7 days  Lab Units 05/15/18  0658   SODIUM mmol/L 139   POTASSIUM mmol/L 3.6   CHLORIDE mmol/L 108   CO2 mmol/L 24.0   BUN mg/dL 16   CREATININE mg/dL 0.80   GLUCOSE mg/dL 100   CALCIUM mg/dL 9.5         Assessment:#1.  Post operative day 2 left below-knee amputation secondary to severe peripheral vascular disease with wet gangrene of the left foot  #2.  Diabetes mellitus      Plan: Continue with daily dressing changes to the left below-knee amputation stump.  IV antibiotics to be managed by infectious disease.  Overall medical management to be managed by the hospitalist.  Roger green to place a below-knee amputation stump protector-I have personally called Roger green and talked to Gregory  personally about this .      Danie Carreno MD - 05/16/18 - 9:11 AM

## 2018-05-16 NOTE — PLAN OF CARE
Problem: Patient Care Overview  Goal: Plan of Care Review  Outcome: Ongoing (interventions implemented as appropriate)   05/14/18 0539 05/16/18 1527   Coping/Psychosocial   Plan of Care Reviewed With --  patient   Plan of Care Review   Progress no change --    OTHER   Outcome Summary --  Patient ambulates with rolling walker and refuses nursing assistance. Patient very eager to get home and care for self.      Goal: Individualization and Mutuality  Outcome: Ongoing (interventions implemented as appropriate)   05/16/18 1527   Individualization   Patient Specific Goals (Include Timeframe) Patient prefers to ambulate with walker and be independent.     Goal: Discharge Needs Assessment  Outcome: Ongoing (interventions implemented as appropriate)   05/11/18 1542   Discharge Needs Assessment   Readmission Within the Last 30 Days no previous admission in last 30 days   Concerns to be Addressed discharge planning   Patient/Family Anticipates Transition to home with family;home with help/services;inpatient rehabilitation facility   Patient/Family Anticipated Services at Transition home health care;rehabilitation services   Transportation Concerns car, none   Transportation Anticipated family or friend will provide  (May need help with transportation. Patient is checking with family members. He does not have a car.)   Equipment Needed After Discharge (TBD)   Current Discharge Risk chronically ill;physical impairment   Disability   Equipment Currently Used at Home crutches;cane, straight

## 2018-05-16 NOTE — PROGRESS NOTES
Continued Stay Note  [facility]     Patient Name: Sandoval Hernandez  MRN: [id number]  Today's Date: 5/16/2018    Admit Date: 5/11/2018          Discharge Plan     Row Name 05/16/18 1454       Plan    Plan Home with home health vs other    Patient/Family in Agreement with Plan yes    Plan Comments Long discussion with patient regarding the discharge plan. Patient is not interested in going to skilled rehab. CM mentioned that patient might have better pain control and will have the benefit of daily PT if he goes to skilled rehab, but he insists that it would be too much trouble for his family and that he is motivated enough to do therapy on his own. He would consider acute at [facility], but, per Jane with [employer], his insurance will not approve acute rehab usually until he has his prosthesis. Patient is agreeable to home health for skilled nursing and PT and would like [facility]. Referral called to [facility]. [phone], and clinical faxed to [phone]. Patient will need the DC summary and signed HH order faxed the day of discharge. Salma has also requested an RN report so that they are aware of any wound care orders, etc. Dr. Carreno, please advise if you are okay with this plan and if you will be signing the intial home health order. VANDANA has spoken with Dr. Rodriguez as well. He anticipates discharge on Friday after patient has been able to complete a reasonable course of abx and have the nerve cath removed. Patient also needs a day to arrange transportation. CM will continue to follow.               Discharge Codes    No documentation.       Expected Discharge Date and Time     Expected Discharge Date Expected Discharge Time    May 18, 2018             Izabel Abraham

## 2018-05-16 NOTE — PROGRESS NOTES
[facility] Medicine Services  PROGRESS NOTE    Patient Name: Sandoval Hernandez  : 1960  MRN: [id number]    Date of Admission: 2018  Length of Stay: 5  Primary Care Physician: ALETA Plummer    Subjective   Subjective     CC: left foor gangrene    HPI:  -no fever, pain controlled, no dyspnea    Review of Systems  Gen- No fevers, chills  CV- No chest pain, palpitations  Resp- No cough, dyspnea  GI- No N/V/D, abd pain    Otherwise ROS is negative except as mentioned in the HPI.    Objective   Objective     Vital Signs:   Temp:  [97.7 °F (36.5 °C)-99.4 °F (37.4 °C)] 97.7 °F (36.5 °C)  Heart Rate:  [65-76] 71  Resp:  [14-18] 18  BP: (117-152)/(53-81) 126/64      Physical Exam  Constitutional: No acute distress, awake, alert, comfortable  HENT: NCAT, mucous membranes moist  Respiratory: Clear to auscultation bilaterally, respiratory effort normal   Cardiovascular: RRR, no murmurs, rubs, or gallops, palpable pedal pulses bilaterally  Gastrointestinal: Positive bowel sounds, soft, nontender, nondistended  Musculoskeletal: No bilateral ankle edema, left BKA, stump in dressing, JAREN drain inplace  Psychiatric: Appropriate affect, cooperative  Neurologic: Oriented x 3, strength symmetric in all extremities, Cranial Nerves grossly intact to confrontation, speech clear  Skin: s/p left bka amputation, stump cleanly dressed; no rash noted    Results Reviewed:  I have personally reviewed current lab, radiology, and data and agree.      Results from last 7 days  Lab Units 05/15/18  0658 18  0116   WBC 10*3/mm3 7.92 9.85   HEMOGLOBIN g/dL 9.5* 11.1*   HEMATOCRIT % 30.0* 34.6*   PLATELETS 10*3/mm3 216 275       Results from last 7 days  Lab Units 05/15/18  0658 18  0536 18  0116   SODIUM mmol/L 139 141 138   POTASSIUM mmol/L 3.6 4.3 3.6   CHLORIDE mmol/L 108 107 108   CO2 mmol/L 24.0 25.0 20.3*   BUN mg/dL 16 18 34*   CREATININE mg/dL 0.80 0.80 1.23   GLUCOSE mg/dL 100 118* 199*    CALCIUM mg/dL 9.5 9.3 10.8*   ALT (SGPT) U/L  --   --  83*   AST (SGOT) U/L  --   --  68*     Estimated Creatinine Clearance: 98.7 mL/min (by C-G formula based on SCr of 0.8 mg/dL).  No results found for: BNP  No results found for: PHART    Microbiology Results Abnormal     Procedure Component Value - Date/Time    Wound Culture - Wound, Foot [[id number]]  (Normal) Collected:  05/11/18 1111    Lab Status:  Final result Specimen:  Wound from Foot Updated:  05/13/18 1055     Wound Culture No growth at 2 days     Gram Stain Result No WBCs or organisms seen        I have reviewed the medications.    Assessment/Plan   Assessment / Plan     Hospital Problem List     * (Principal)Gangrene of left foot    Cellulitis of left lower extremity    PAD (peripheral artery disease)    Diabetes mellitus, type 2    Tobacco abuse    Renal insufficiency    Hypertension         Brief Hospital Course to date:  Sandoval Hernandez is a 58 y.o. male with a history of type 2 diabetes, ongoing tobacco abuse, renal specialist and see, peripheral arterial disease who presented to [facility] with a gangrenous left foot.  Records from [facility] indicate he was just discharged later on May 8 and during that stay had a stenting of his left common iliac artery and left superior femoral artery.  He also required repeat intervention to the left superficial femoral artery stent due to fracture.  After discharge with continued pain and gangrene and is transferred to [place] for further evaluation.     Assessment & Plan:  - s/p left BKA 5/14 with Dr. Carreno, continue zosyn and doxy per ID  - continue ASA and will resume plavix, continue increased statin dose.  - a1c 7.3% , currently acceptable glc on current insulin.  - continue nicotine patch, counseled regarding need for cessation  - Cr down to 0.8 from 1.2 in [place]  - will likely need rehab after his BKA     -hgb, bmp in a.m.  -per ID 5/15 note, doxy and zosyn 48-72 hrs postoperatively which  would be until 5/17.   -anticipate d/c home w/ h.h. (if ok w/ dr. Carreno) Friday is current plan due to transporation issues as well. Patient currently declining inpt rehab    DVT Prophylaxis: [facility]     CODE STATUS: Full Code     Disposition: I expect the patient to be discharged TBD    Electronically signed by Tavares Rodriguez MD, 05/16/18, 5:20 PM.

## 2018-05-17 LAB
ABO + RH BLD: NORMAL
ABO + RH BLD: NORMAL
ANION GAP SERPL CALCULATED.3IONS-SCNC: 8 MMOL/L (ref 3–11)
BH BB BLOOD EXPIRATION DATE: NORMAL
BH BB BLOOD EXPIRATION DATE: NORMAL
BH BB BLOOD TYPE BARCODE: 5100
BH BB BLOOD TYPE BARCODE: 5100
BH BB DISPENSE STATUS: NORMAL
BH BB DISPENSE STATUS: NORMAL
BH BB PRODUCT CODE: NORMAL
BH BB PRODUCT CODE: NORMAL
BH BB UNIT NUMBER: NORMAL
BH BB UNIT NUMBER: NORMAL
BUN BLD-MCNC: 14 MG/DL (ref 9–23)
BUN/CREAT SERPL: 17.5 (ref 7–25)
CALCIUM SPEC-SCNC: 9.4 MG/DL (ref 8.7–10.4)
CHLORIDE SERPL-SCNC: 109 MMOL/L (ref 99–109)
CO2 SERPL-SCNC: 24 MMOL/L (ref 20–31)
CREAT BLD-MCNC: 0.8 MG/DL (ref 0.6–1.3)
CROSSMATCH INTERPRETATION: NORMAL
CROSSMATCH INTERPRETATION: NORMAL
GFR SERPL CREATININE-BSD FRML MDRD: 99 ML/MIN/1.73
GLUCOSE BLD-MCNC: 112 MG/DL (ref 70–100)
GLUCOSE BLDC GLUCOMTR-MCNC: 109 MG/DL (ref 70–130)
GLUCOSE BLDC GLUCOMTR-MCNC: 145 MG/DL (ref 70–130)
GLUCOSE BLDC GLUCOMTR-MCNC: 153 MG/DL (ref 70–130)
GLUCOSE BLDC GLUCOMTR-MCNC: 99 MG/DL (ref 70–130)
HCT VFR BLD AUTO: 29.4 % (ref 38.9–50.9)
HGB BLD-MCNC: 9.2 G/DL (ref 13.1–17.5)
POTASSIUM BLD-SCNC: 3.8 MMOL/L (ref 3.5–5.5)
SODIUM BLD-SCNC: 141 MMOL/L (ref 132–146)
UNIT  ABO: NORMAL
UNIT  ABO: NORMAL
UNIT  RH: NORMAL
UNIT  RH: NORMAL

## 2018-05-17 PROCEDURE — 97116 GAIT TRAINING THERAPY: CPT

## 2018-05-17 PROCEDURE — 85014 HEMATOCRIT: CPT | Performed by: INTERNAL MEDICINE

## 2018-05-17 PROCEDURE — 25010000002 PIPERACILLIN SOD-TAZOBACTAM PER 1 G: Performed by: INTERNAL MEDICINE

## 2018-05-17 PROCEDURE — 97110 THERAPEUTIC EXERCISES: CPT

## 2018-05-17 PROCEDURE — 25010000002 ROPIVACAINE PER 1 MG: Performed by: NURSE ANESTHETIST, CERTIFIED REGISTERED

## 2018-05-17 PROCEDURE — 82962 GLUCOSE BLOOD TEST: CPT

## 2018-05-17 PROCEDURE — 80048 BASIC METABOLIC PNL TOTAL CA: CPT | Performed by: INTERNAL MEDICINE

## 2018-05-17 PROCEDURE — 85018 HEMOGLOBIN: CPT | Performed by: INTERNAL MEDICINE

## 2018-05-17 PROCEDURE — 99024 POSTOP FOLLOW-UP VISIT: CPT | Performed by: THORACIC SURGERY (CARDIOTHORACIC VASCULAR SURGERY)

## 2018-05-17 PROCEDURE — 25010000002 HEPARIN (PORCINE) PER 1000 UNITS: Performed by: PHYSICIAN ASSISTANT

## 2018-05-17 PROCEDURE — 99232 SBSQ HOSP IP/OBS MODERATE 35: CPT | Performed by: INTERNAL MEDICINE

## 2018-05-17 RX ADMIN — OXYCODONE HYDROCHLORIDE AND ACETAMINOPHEN 1 TABLET: 5; 325 TABLET ORAL at 00:49

## 2018-05-17 RX ADMIN — ATORVASTATIN CALCIUM 80 MG: 40 TABLET, FILM COATED ORAL at 20:43

## 2018-05-17 RX ADMIN — CARVEDILOL 25 MG: 12.5 TABLET, FILM COATED ORAL at 17:20

## 2018-05-17 RX ADMIN — TAZOBACTAM SODIUM AND PIPERACILLIN SODIUM 4.5 G: 500; 4 INJECTION, SOLUTION INTRAVENOUS at 17:20

## 2018-05-17 RX ADMIN — CARVEDILOL 25 MG: 12.5 TABLET, FILM COATED ORAL at 09:06

## 2018-05-17 RX ADMIN — DOXYCYCLINE 100 MG: 100 CAPSULE ORAL at 20:43

## 2018-05-17 RX ADMIN — OXYCODONE HYDROCHLORIDE AND ACETAMINOPHEN 1 TABLET: 5; 325 TABLET ORAL at 06:00

## 2018-05-17 RX ADMIN — OXYCODONE HYDROCHLORIDE AND ACETAMINOPHEN 1 TABLET: 5; 325 TABLET ORAL at 21:02

## 2018-05-17 RX ADMIN — HEPARIN SODIUM 5000 UNITS: 5000 INJECTION, SOLUTION INTRAVENOUS; SUBCUTANEOUS at 09:06

## 2018-05-17 RX ADMIN — TAZOBACTAM SODIUM AND PIPERACILLIN SODIUM 4.5 G: 500; 4 INJECTION, SOLUTION INTRAVENOUS at 00:27

## 2018-05-17 RX ADMIN — ROPINIROLE 1 MG: 0.5 TABLET, FILM COATED ORAL at 20:44

## 2018-05-17 RX ADMIN — ALLOPURINOL 300 MG: 300 TABLET ORAL at 09:06

## 2018-05-17 RX ADMIN — TAZOBACTAM SODIUM AND PIPERACILLIN SODIUM 4.5 G: 500; 4 INJECTION, SOLUTION INTRAVENOUS at 09:07

## 2018-05-17 RX ADMIN — CLONIDINE HYDROCHLORIDE 0.1 MG: 0.1 TABLET ORAL at 09:06

## 2018-05-17 RX ADMIN — ASPIRIN 81 MG 324 MG: 81 TABLET ORAL at 09:06

## 2018-05-17 RX ADMIN — OXYCODONE HYDROCHLORIDE AND ACETAMINOPHEN 1 TABLET: 5; 325 TABLET ORAL at 16:17

## 2018-05-17 RX ADMIN — ROPIVACAINE HYDROCHLORIDE 12 ML/HR: 2 INJECTION, SOLUTION EPIDURAL; INFILTRATION at 17:41

## 2018-05-17 RX ADMIN — HEPARIN SODIUM 5000 UNITS: 5000 INJECTION, SOLUTION INTRAVENOUS; SUBCUTANEOUS at 20:44

## 2018-05-17 RX ADMIN — AMLODIPINE BESYLATE 10 MG: 10 TABLET ORAL at 09:06

## 2018-05-17 RX ADMIN — DOXYCYCLINE 100 MG: 100 CAPSULE ORAL at 09:06

## 2018-05-17 RX ADMIN — LISINOPRIL 40 MG: 20 TABLET ORAL at 09:06

## 2018-05-17 RX ADMIN — OXYCODONE HYDROCHLORIDE AND ACETAMINOPHEN 1 TABLET: 5; 325 TABLET ORAL at 10:23

## 2018-05-17 RX ADMIN — ROPIVACAINE HYDROCHLORIDE 12 ML/HR: 2 INJECTION, SOLUTION EPIDURAL; INFILTRATION at 00:22

## 2018-05-17 RX ADMIN — CLONIDINE HYDROCHLORIDE 0.1 MG: 0.1 TABLET ORAL at 20:43

## 2018-05-17 RX ADMIN — INSULIN LISPRO 2 UNITS: 100 INJECTION, SOLUTION INTRAVENOUS; SUBCUTANEOUS at 12:26

## 2018-05-17 NOTE — DISCHARGE PLACEMENT REQUEST
"Yasmine Lee  (58 y.o. Male)     From [place],   [phone]        Date of Birth Social Security Number Address Home Phone MRN    1960  [address]   [facility] [address] [phone] [id number]    [employer] Marital Status          Non-Samaritan        Admission Date Admission Type Admitting Provider Attending Provider Department, Room/Bed    18 Emergency Tavares Rodriguez MD West, Christopher R, MD [facility] 3E, S337/1    Discharge Date Discharge Disposition Discharge Destination                       Attending Provider:  Tavares Rodriguez MD    Allergies:  No Known Allergies    Isolation:  None   Infection:  None   Code Status:  FULL    Ht:  162.6 cm (64.02\")   Wt:  84.4 kg (186 lb)    Admission Cmt:  None   Principal Problem:  Gangrene of left foot [I96]                 Active Insurance as of 2018     Primary Coverage     Payor Plan Insurance Group Employer/Plan Group    HUMANA MEDICARE REPLACEMENT HUMANA MEDICARE REPL [id number]     Payor Plan Address Payor Plan Phone Number Effective From Effective To    [address] [address] [phone] 2018     [facility] [id number]       Subscriber Name Subscriber Birth Date Member ID       YASMINE LEE 1960 [id number]                 Emergency Contacts      (Rel.) Home Phone Work Phone Mobile Phone    Deirdre Lee (Spouse) [phone] -- --    Meri Farias -- -- [phone]               History & Physical      Marcel Fagan MD at 2018 10:28 AM              [facility] Medicine Services  HISTORY AND PHYSICAL    Patient Name: Yasmine Lee  : 1960  MRN: [id number]  Primary Care Physician: ALETA Plummer    Subjective   Subjective     Chief Complaint:  Foot pain    HPI:  Yasmine Lee is a 58 y.o. male with a history of coronary artery disease, peripheral arterial disease, type 2 diabetes on oral hypoglycemics, and ongoing tobacco abuse who presented " "to [facility] with complaints of left foot pain.  He has had increasing pain and duskiness to his left midfoot into his toes.  Previous first and fourth toe amputations.  He has had some serous drainage but no purulence.  Associated with redness and warmth.  Apparently 2 weeks ago at [facility] he had stenting done in his left leg but cannot give me full details.  He said the first stent \"collapsed\" and then a second stent was placed.  After that he said he had 6 stents placed across vessels in his abdomen, it is unclear what he could be referring to.  It is also unclear if his taking any antiplatelets.  In the emergency room he was found to have evidence of necrotic toes and areas on his foot.  He has transferred to [facility] for more definitive management.  The patient is understanding he is likely to need amputation and is willing to proceed if needed.    Review of Systems   Constitutional: Negative for fever.   Respiratory: Positive for shortness of breath.    Cardiovascular: Negative for chest pain.   Gastrointestinal: Negative for abdominal pain.   Musculoskeletal: Positive for arthralgias.   All other systems reviewed and are negative.         Otherwise 10-system ROS reviewed and is negative except as mentioned in the HPI.    Personal History     Past Medical History:   Diagnosis Date   • Diabetes mellitus    • Gout    • Hypertension        Past Surgical History:   Procedure Laterality Date   • AMPUTATION FOOT / TOE      amputation of toes to left foot       Family History: Positive for diabetes.    Social History:  reports that he has been smoking.  He does not have any smokeless tobacco history on file.  Social History     Social History Narrative   • No narrative on file   Smokes half pack to a pack a day.  Denies any alcohol use.  Currently lives at home.    Medications:  Prescriptions Prior to Admission   Medication Sig Dispense Refill Last Dose   • allopurinol (ZYLOPRIM) 300 MG " tablet Take 300 mg by mouth Daily.      • amLODIPine (NORVASC) 10 MG tablet Take 10 mg by mouth Daily.      • atorvastatin (LIPITOR) 20 MG tablet Take 20 mg by mouth Daily.      • carvedilol (COREG) 25 MG tablet Take 25 mg by mouth 2 (Two) Times a Day With Meals.      • CloNIDine (CATAPRES) 0.1 MG tablet Take 0.1 mg by mouth 2 (Two) Times a Day.      • fenofibrate (TRICOR) 145 MG tablet Take 145 mg by mouth Daily.      • ibuprofen (ADVIL,MOTRIN) 800 MG tablet Take 800 mg by mouth Every 6 (Six) Hours As Needed for Mild Pain .      • lisinopril (PRINIVIL,ZESTRIL) 40 MG tablet Take 40 mg by mouth Daily.      • metFORMIN (GLUCOPHAGE) 1000 MG tablet Take 1,000 mg by mouth 2 (Two) Times a Day With Meals.      • nitroglycerin (NITROSTAT) 0.4 MG SL tablet Place 0.4 mg under the tongue Every 5 (Five) Minutes As Needed for Chest Pain. Take no more than 3 doses in 15 minutes.      • rOPINIRole (REQUIP) 1 MG tablet Take 1 mg by mouth Every Night. Take 1 hour before bedtime.          No Known Allergies    Objective   Objective     Vital Signs:   Temp:  [97.8 °F (36.6 °C)-98.9 °F (37.2 °C)] 98.6 °F (37 °C)  Heart Rate:  [82-92] 91  Resp:  [18] 18  BP: (130-163)/() 143/76        Physical Exam   Constitutional: No acute distress, awake, alert, appears older than age  HENT: NCAT, mucous membranes moist, poor dentition  Respiratory: diminished but clear, respiratory effort normal   Cardiovascular: RRR, no murmurs, rubs, or gallops  Gastrointestinal: Positive bowel sounds, soft, nontender, nondistended  Musculoskeletal: left leg with previous 1st, 4th amputations.  2/3rd toes with purple discoloration.  Redness to mid-foot.  Also area or necrosis to lateral side  Psychiatric: Appropriate affect, cooperative  Neurologic: Oriented x 3, strength symmetric in all extremities, Cranial Nerves grossly intact to confrontation, speech clear  Skin: No rashes    Results Reviewed:  I have personally reviewed current lab, radiology, and data  and agree.      Results from last 7 days  Lab Units 05/11/18  0116   WBC 10*3/mm3 9.85   HEMOGLOBIN g/dL 11.1*   HEMATOCRIT % 34.6*   PLATELETS 10*3/mm3 275       Results from last 7 days  Lab Units 05/11/18  0116   SODIUM mmol/L 138   POTASSIUM mmol/L 3.6   CHLORIDE mmol/L 108   CO2 mmol/L 20.3*   BUN mg/dL 34*   CREATININE mg/dL 1.23   GLUCOSE mg/dL 199*   CALCIUM mg/dL 10.8*   ALT (SGPT) U/L 83*   AST (SGOT) U/L 68*     Estimated Creatinine Clearance: 63.8 mL/min (by C-G formula based on SCr of 1.23 mg/dL).  Brief Urine Lab Results     None        BNP   Date Value Ref Range Status   05/11/2018 5.0 0.0 - 100.0 pg/mL Final     No results found for: PHART  Imaging Results (last 24 hours)     ** No results found for the last 24 hours. **             Assessment/Plan   Assessment / Plan     Hospital Problem List     * (Principal)Gangrene of left foot    Cellulitis of left lower extremity    PAD (peripheral artery disease)    Diabetes mellitus, type 2    Tobacco abuse    Renal insufficiency            Assessment & Plan:  58-year-old male with history of peripheral arterial disease with previous left toe amputations presents with increasing pain and evidence of gangrene of his left foot and toes.  Apparently recently had some stenting done to the left lower extremity at [facility], we will try to obtain those records to determine degree of disease.  We'll start empiric antibiotics with vancomycin and Zosyn.  We'll have infectious disease help guide antibiotic therapy.  We'll consult Dr. Carreno to evaluate for possible amputation.  The patient understands and is agreeable to proceed if needed.  Place him on aspirin, increase his statin to 80 mg of Lipitor.  Replacement signed scale insulin and checking a when A1c.  We'll need good glucose control for wound healing.  Counseled on tobacco abuse (nicotine patch.  Creatinine is elevated to 1.2 with a GFR of 60 suspect this is chronic but will recheck in the  morning.    DVT prophylaxis: heparin    CODE STATUS:  Full Code    Admission Status:  I believe this patient meets INPATIENT status due to the need for care which can only be reasonably provided in an hospital setting such as aggressive/expedited ancillary services and/or consultation services, the necessity for IV medications, close physician monitoring and/or the possible need for procedures.  In such, I feel patient’s risk for adverse outcomes and need for care warrant INPATIENT evaluation and predict the patient’s care encounter to likely last beyond 2 midnights.      Electronically signed by Marcel Fagan MD, 05/11/18, 10:28 AM.          Electronically signed by Marcel Fagan MD at 5/11/2018 10:41 AM          Operative/Procedure Notes (most recent note)      Danie Carreno MD at 5/14/2018  3:52 PM        Procedure Note    Patient Name:  Sandoval Hernandez    Date of Surgery: 05/14/2018      Pre-op Diagnosis: Ischemic gangrene left secondary to severe peripheral vascular disease    Post-op Diagnosis:   Same    Surgeon(s):  Danie Carreno MD    Assistant(s) Yuniel Christina PA-C:    Anesthesia: Regional popliteal nerve block with intravenous induction of general LMA anesthesia    Indications: Patient transferred from [facility] facility with severe wet gangrene of the left lower extremity involving the left foot.  Recently the patient had endovascular intervention of the left leg to include left iliac stenting and apparently thinning of the  superficial femoral artery.  Following his procedures he has developed ischemic gangrene of the left lower extremity and on transfer to our facility it was a nonsalvageable situation of the left foot.  We discussed the need for left below knee amputation with the patient: the risk of bleeding and infection, stroke, heart attack and death as well as possible postop phantom pain were all discussed with the patient and he understood and agreed to proceed ahead with left below-knee  amputation.    Procedure(s): Left below-knee amputation    The patient had placement of a popliteal nerve catheter in the left lower extremity per Dr. Brooks of anesthesia and then was taken the room and underwent intravenous induction of general anesthesia with LMA.  Timeout was called to verify the procedure to be performed, that being a left below-knee amputation.  After verification the patient was prepped from the toes to the upper thigh of the left leg with Hibiclens solution and was sterilely draped.  A anterior skin flap was made in the left lower extremity approximately 4 fingerbreadths below the tibial tuberosity extended medially and laterally in to the calf to the midline.  The incisions were then directed inferiorly on both the medial and lateral aspects of the calf in the midline down to the ankle where posteriorly these 2 incisions were met.  The anterior  flap was developed by dividing the anterior compartment muscles down to the anterior tibial vessels which were then ligated with 2-0 silk suture proximal and distally.  A transfixion stitch of 2.0 silk was also used to further secure this hemostasis.  Laterally in the calf the the fibula was identified and the periosteum was elevated off the fibula approximately 2 cm above the anterior skin flap incision and was divided with the double-action bone cutter.  The tibia was then divided with the bone power saw at the level of the skin incision of the anterior flap.  The posterior flap was completed by using amputation knife under the fibula and the tibia and dividing the musculature of the posterior flap down to the ankle level where the skin incisions were then met posteriorly with amputation knife and the specimen was removed from the operative field.  The posterior tibial vessels and the peroneal tibial vessels were identified and were ligated with 2-0 silk suture and a transfixion stitch of 2-0 silk.  The soleus was then dissected free from the  gastrocnemius fascia with blunt and sharp dissection and the soleus was then divided at the anterior skin flap level with the Bovie cautery and removed from the operative field.  After hemostasis was controlled with Bovie cautery the entire open stump was then irrigated with 3 L of orthopedic antibiotic solution with a Pulsavac fashion.  The posterior skin flap was then tailored to the proper length along with the gastrocnemius fascia and the gastrocnemius fascia was then sutured to the periosteum of the tibia after the tibia had been beveled on its anterior portion with the bone power saw and rasp to smooth the edges.  The gastrocnemius fascia was sutured to the  anterior tibial periosteum with interrupted 2-0 Vicryl sutures.  Laterally and medially the gastrocnemius fascia was sutured to the muscle fascia of the anterior compartment with interrupted 2-0 Vicryl sutures.  A 15 Mauritian round Santi-Freitas drain was placed under the gastrocnemius fascia prior to approximating the posterior flap to the anterior flap.  At the exit at the skin laterally the drain was anchored with a 2-0 silk and was attached to a bulb vacuum reservoir..  The subcutaneous layer of the posterior flap was sutured to the subcutaneous tissue of anterior flap with interrupted 3-0 Vicryl sutures and the skin was reapproximated with interrupted 4-0 nylon sutures.  The incision was covered with Adaptic gauze followed by 4 x 4 fluffs and Kerlix wraps and #8 Spandage tube net dressing to hold the dressing in place.  The bulb vacuum reservoir was placed in a small pouch and attached to the Spandage dressing with the Velcro attachment.  The patient was awakened and the LMA was removed and the patient was taken to the recovery room with stable vital signs.  Blood loss was approximately 200 mL's, no complications occurred, and sponge and needle count were correct ×2.    Estimated Blood Loss 200 mL's    Findings: The musculature of the posterior and  anterior flap had good bleeding and there was no evidence of any subcutaneous infection noted or edema within the musculature.  The skin edges of the incision are viable    Complications: No immediate complications occurred      Danie Carreno MD     Date: 5/14/2018 Time: 5:51 PM    Electronically signed by Danie Carreno MD at 5/14/2018  6:13 PM          Physician Progress Notes (last 24 hours) (Notes from 5/16/2018 11:33 AM through 5/17/2018 11:33 AM)      Danie Carreno MD at 5/17/2018  8:38 AM          Cardiothoracic Surgery Progress Note      POD #: 3-left below-knee amputation     LOS: 6 days      Subjective: Awake and alert    Chief Complaint: Minimal surgical incision BKA stump pain    Objective:  Vital Signs  Temp:  [97.7 °F (36.5 °C)-98.8 °F (37.1 °C)] 98.3 °F (36.8 °C)  Heart Rate:  [62-75] 70  Resp:  [18-20] 18  BP: (117-165)/(64-87) 157/74    Physical Exam:   General Appearance: Oriented ×3     Lungs:   Heart:   Skin:   Incision: Left BKA stump dressing changed sutures are intact anterior and posterior flaps are viable     Results:    Results from last 7 days  Lab Units 05/17/18  0514 05/15/18  0658   WBC 10*3/mm3  --  7.92   HEMOGLOBIN g/dL 9.2* 9.5*   HEMATOCRIT % 29.4* 30.0*   PLATELETS 10*3/mm3  --  216       Results from last 7 days  Lab Units 05/17/18  0514   SODIUM mmol/L 141   POTASSIUM mmol/L 3.8   CHLORIDE mmol/L 109   CO2 mmol/L 24.0   BUN mg/dL 14   CREATININE mg/dL 0.80   GLUCOSE mg/dL 112*   CALCIUM mg/dL 9.4         Assessment:#1 postoperative day 3 left below-knee amputation secondary to severe peripheral vascular disease with wet gangrene of the left foot  #2.  Diabetes mellitus with peripheral neuropathy      Plan: Continue daily daily dressing changes of the left below-knee amputation stump.  IV antibiotic's per infectious disease.  Medical management per hospitalist.  From my concerns the patient may be transferred to a rehabilitation facility at any time I am concerned about him going  home for home health visits.  I think at this point he needs structured rehabilitation in a facility      Danie Carreno MD - 18 - 8:39 AM          Electronically signed by Danie Carreno MD at 2018  8:40 AM     Tavares Rodriguez MD at 2018  5:20 PM              [facility] Medicine Services  PROGRESS NOTE    Patient Name: Sandoval Hernandez  : 1960  MRN: [id number]    Date of Admission: 2018  Length of Stay: 5  Primary Care Physician: ALETA Plummer    Subjective   Subjective     CC: left foor gangrene    HPI:  -no fever, pain controlled, no dyspnea    Review of Systems  Gen- No fevers, chills  CV- No chest pain, palpitations  Resp- No cough, dyspnea  GI- No N/V/D, abd pain    Otherwise ROS is negative except as mentioned in the HPI.    Objective   Objective     Vital Signs:   Temp:  [97.7 °F (36.5 °C)-99.4 °F (37.4 °C)] 97.7 °F (36.5 °C)  Heart Rate:  [65-76] 71  Resp:  [14-18] 18  BP: (117-152)/(53-81) 126/64      Physical Exam  Constitutional: No acute distress, awake, alert, comfortable  HENT: NCAT, mucous membranes moist  Respiratory: Clear to auscultation bilaterally, respiratory effort normal   Cardiovascular: RRR, no murmurs, rubs, or gallops, palpable pedal pulses bilaterally  Gastrointestinal: Positive bowel sounds, soft, nontender, nondistended  Musculoskeletal: No bilateral ankle edema, left BKA, stump in dressing, JAREN drain inplace  Psychiatric: Appropriate affect, cooperative  Neurologic: Oriented x 3, strength symmetric in all extremities, Cranial Nerves grossly intact to confrontation, speech clear  Skin: s/p left bka amputation, stump cleanly dressed; no rash noted    Results Reviewed:  I have personally reviewed current lab, radiology, and data and agree.      Results from last 7 days  Lab Units 05/15/18  0658 18  0116   WBC 10*3/mm3 7.92 9.85   HEMOGLOBIN g/dL 9.5* 11.1*   HEMATOCRIT % 30.0* 34.6*   PLATELETS 10*3/mm3 216 275       Results  from last 7 days  Lab Units 05/15/18  0658 05/12/18  0536 05/11/18  0116   SODIUM mmol/L 139 141 138   POTASSIUM mmol/L 3.6 4.3 3.6   CHLORIDE mmol/L 108 107 108   CO2 mmol/L 24.0 25.0 20.3*   BUN mg/dL 16 18 34*   CREATININE mg/dL 0.80 0.80 1.23   GLUCOSE mg/dL 100 118* 199*   CALCIUM mg/dL 9.5 9.3 10.8*   ALT (SGPT) U/L  --   --  83*   AST (SGOT) U/L  --   --  68*     Estimated Creatinine Clearance: 98.7 mL/min (by C-G formula based on SCr of 0.8 mg/dL).  No results found for: BNP  No results found for: PHART    Microbiology Results Abnormal     Procedure Component Value - Date/Time    Wound Culture - Wound, Foot [[id number]]  (Normal) Collected:  05/11/18 1111    Lab Status:  Final result Specimen:  Wound from Foot Updated:  05/13/18 1055     Wound Culture No growth at 2 days     Gram Stain Result No WBCs or organisms seen        I have reviewed the medications.    Assessment/Plan   Assessment / Plan     Hospital Problem List     * (Principal)Gangrene of left foot    Cellulitis of left lower extremity    PAD (peripheral artery disease)    Diabetes mellitus, type 2    Tobacco abuse    Renal insufficiency    Hypertension         Brief Hospital Course to date:  Sandoval Hernandez is a 58 y.o. male with a history of type 2 diabetes, ongoing tobacco abuse, renal specialist and see, peripheral arterial disease who presented to [facility] with a gangrenous left foot.  Records from [facility] indicate he was just discharged later on May 8 and during that stay had a stenting of his left common iliac artery and left superior femoral artery.  He also required repeat intervention to the left superficial femoral artery stent due to fracture.  After discharge with continued pain and gangrene and is transferred to [place] for further evaluation.     Assessment & Plan:  - s/p left BKA 5/14 with Dr. Carreno, continue zosyn and doxy per ID  - continue ASA and will resume plavix, continue increased statin dose.  - a1c 7.3% ,  currently acceptable glc on current insulin.  - continue nicotine patch, counseled regarding need for cessation  - Cr down to 0.8 from 1.2 in [place]  - will likely need rehab after his BKA     -hgb, bmp in a.m.  -per ID 5/15 note, doxy and zosyn 48-72 hrs postoperatively which would be until .   -anticipate d/c home w/ h.h. (if ok w/ dr. Carreno) Friday is current plan due to transporation issues as well. Patient currently declining inpt rehab    DVT Prophylaxis: [facility]     CODE STATUS: Full Code     Disposition: I expect the patient to be discharged TBD    Electronically signed by Tavares Rodriguez MD, 18, 5:20 PM.      Electronically signed by Tavares Rdoriguez MD at 2018  5:22 PM          Physical Therapy Notes (most recent note)      Jovana Charles, PT at 2018 10:32 AM  Version 1 of 1         Acute Care - Physical Therapy Treatment Note  [facility]     Patient Name: Sandoval Hernandez  : 1960  MRN: [id number]  Today's Date: 2018  Onset of Illness/Injury or Date of Surgery: 18  Date of Referral to PT: 18  Referring Physician: SHAMIR Lott    Admit Date: 2018    Visit Dx:    ICD-10-CM ICD-9-CM   1. Gangrene of left foot I96 785.4   2. Impaired functional mobility, balance, gait, and endurance Z74.09 V49.89     Patient Active Problem List   Diagnosis   • Cellulitis of left lower extremity   • Gangrene of left foot   • PAD (peripheral artery disease)   • Diabetes mellitus, type 2   • Tobacco abuse   • Renal insufficiency   • Hypertension       Therapy Treatment          Rehabilitation Treatment Summary     Row Name 18 0825             Treatment Time/Intention    Discipline physical therapist  -BD      Document Type therapy note (daily note)  -BD      Subjective Information no complaints  -BD      Mode of Treatment individual therapy  -BD      Patient/Family Observations Pt supinein bed, IV, nerve cath.  -BD      Care Plan Review evaluation/treatment results  reviewed  -BD      Total Minutes, Physical Therapy Treatment 40  -BD      Therapy Frequency (PT Clinical Impression) daily  -BD      Patient Effort excellent  -BD      Recorded by [BD] Jovana Charles, PT 05/17/18 1027      Row Name 05/17/18 0825             Vital Signs    Pre Systolic BP Rehab 154  -BD      Pre Treatment Diastolic BP 74  -BD      Pretreatment Heart Rate (beats/min) 86  -BD      Intratreatment Heart Rate (beats/min) 91  -BD      Posttreatment Heart Rate (beats/min) 85  -BD      Pre SpO2 (%) 98  -BD      O2 Delivery Pre Treatment room air  -BD      O2 Delivery Intra Treatment room air  -BD      Post SpO2 (%) 99  -BD      O2 Delivery Post Treatment room air  -BD      Pre Patient Position Supine  -BD      Intra Patient Position Standing  -BD      Post Patient Position Sitting  -BD      Recorded by [BD] Jovana Charles, PT 05/17/18 1027      Row Name 05/17/18 0825             Cognitive Assessment/Intervention- PT/OT    Orientation Status (Cognition) oriented x 4  -BD      Recorded by [BD] Jovana Charles, PT 05/17/18 1027      Row Name 05/17/18 0825             Safety Issues, Functional Mobility    Safety Issues Affecting Function (Mobility) impulsivity  -BD      Recorded by [BD] Jovana Charles, PT 05/17/18 1027      Row Name 05/17/18 0825             Bed Mobility Assessment/Treatment    Bed Mobility Assessment/Treatment bed mobility (all) activities  -BD      [place] Level (Bed Mobility) independent  -BD      Assistive Device (Bed Mobility) bed rails  -BD      Recorded by [BD] Jovana Charles, PT 05/17/18 1027      Row Name 05/17/18 0825             Transfer Assessment/Treatment    Transfer Assessment/Treatment sit-stand transfer;stand-sit transfer  -BD      Maintains Weight-bearing Status (Transfers) able to maintain  -BD      Sit-Stand [place] (Transfers) contact guard  -BD      Stand-Sit [place] (Transfers) contact guard  -BD      Recorded by [BD] Jovana Charles,  PT 05/17/18 1027      Row Name 05/17/18 0825             Sit-Stand Transfer    Assistive Device (Sit-Stand Transfers) walker, front-wheeled  -BD      Recorded by [BD] Jovana Charles, PT 05/17/18 1027      Row Name 05/17/18 0825             Stand-Sit Transfer    Assistive Device (Stand-Sit Transfers) walker, front-wheeled  -BD      Recorded by [BD] Jovana Charles, PT 05/17/18 1027      Row Name 05/17/18 0825             Gait/Stairs Assessment/Training    Gait/Stairs Assessment/Training gait/ambulation independence  -BD      [place] Level (Gait) contact guard;verbal cues  -BD      Assistive Device (Gait) walker, front-wheeled  -BD      Distance in Feet (Gait) 150  -BD      Pattern (Gait) 3-point;other (see comments)   L BKA  -BD      Deviations/Abnormal Patterns (Gait) gait speed decreased  -BD      Bilateral Gait Deviations forward flexed posture  -BD      Comment (Gait/Stairs) L BKA, pt used R walker well.  -BD      Recorded by [BD] Jovana Charles, PT 05/17/18 1027      Row Name 05/17/18 0825             Motor Skills Assessment/Interventions    Additional Documentation Therapeutic Exercise (Group);Therapeutic Exercise Interventions (Group)  -BD      Recorded by [BD] Jovana Charles, PT 05/17/18 1027      Row Name 05/17/18 0825             Therapeutic Exercise    Therapeutic Exercise seated, lower extremities  -BD      Recorded by [BD] Jovana Charles, PT 05/17/18 1027      Row Name 05/17/18 0825             Therapeutic Exercise    Lower Extremity (Therapeutic Exercise) quad sets, bilateral;SLR (straight leg raise), bilateral  -BD      Lower Extremity Range of Motion (Therapeutic Exercise) hip flexion/extension, bilateral;ankle dorsiflexion/plantar flexion, right  -BD      Exercise Type (Therapeutic Exercise) AROM (active range of motion)  -BD      Recorded by [BD] Jovana Charles, PT 05/17/18 1027      Row Name 05/17/18 0825             Positioning and Restraints    Pre-Treatment Position  in bed  -BD      Post Treatment Position chair  -BD      In Chair notified nsg;reclined;call light within reach;encouraged to call for assist;exit alarm on;legs elevated  -BD      Recorded by [BD] Jovana Charles, PT 05/17/18 1027      Row Name 05/17/18 0825             Pain Assessment    Additional Documentation Pain Scale: Numbers Pre/Post-Treatment (Group)  -BD      Recorded by [BD] Jovana Charles, PT 05/17/18 1027      Row Name 05/17/18 0825             Pain Scale: Numbers Pre/Post-Treatment    Pain Scale: Numbers, Pretreatment 0/10 - no pain  -BD      Pain Scale: Numbers, Post-Treatment 0/10 - no pain  -BD      Recorded by [BD] Jovana Charles, PT 05/17/18 1027      Row Name                Wound 05/14/18 1720 Left leg incision    Wound - Properties Group Date first assessed: 05/14/18 [BW] Time first assessed: 1720 [BW] Side: Left [BW] Location: leg [BW] Type: incision [BW] Recorded by:  [BW] Alysha Corral RN 05/14/18 1720      User Key  (r) = Recorded By, (t) = Taken By, (c) = Cosigned By    Initials Name Effective Dates Discipline    BD Jovana Charles, PT 04/03/18 -  PT    KARSON Corral RN 09/15/17 -  Nurse          Wound 05/14/18 1720 Left leg incision (Active)   Dressing Appearance dry;intact 5/17/2018  8:00 AM   Closure CHIKA 5/17/2018  8:00 AM   Drainage Amount none 5/17/2018  8:00 AM             Physical Therapy Education     Title: PT OT SLP Therapies (Active)     Topic: Physical Therapy (Active)     Point: Mobility training (Active)    Learning Progress Summary     Learner Status Readiness Method Response Comment Documented by    Patient Active Acceptance E,D NR  BD 05/17/18 1028     Done Janice MENDOZA discussed plan of care and preprosthetic rehab  05/15/18 1048          Point: Home exercise program (Active)    Learning Progress Summary     Learner Status Readiness Method Response Comment Documented by    Patient Active Acceptance E,D NR  BD 05/17/18 1028     Done Janice MENDOZA  discussed plan of care and preprosthetic rehab  05/15/18 1048          Point: Body mechanics (Active)    Learning Progress Summary     Learner Status Readiness Method Response Comment Documented by    Patient Active Acceptance E,D NR   05/17/18 1028     Done Janice MENDOZA discussed plan of care and preprosthetic rehab  05/15/18 1048          Point: Precautions (Active)    Learning Progress Summary     Learner Status Readiness Method Response Comment Documented by    Patient Active Acceptance E,D NR   05/17/18 1028     Done Janice MENDOZA discussed plan of care and preprosthetic rehab  05/15/18 1048                      User Key     Initials Effective Dates Name Provider Type Discipline     06/19/15 -  Jazmine Torres, PT Physical Therapist PT     04/03/18 -  Jovana Charles, PT Physical Therapist PT                    PT Recommendation and Plan  Therapy Frequency (PT Clinical Impression): daily  Plan of Care Reviewed With: patient  Progress: improving  Outcome Summary: Pt with good pain control, very cooperative with PT activities and moving well with transfers, gait training.          Outcome Measures     Row Name 05/17/18 0830 05/15/18 0925          How much help from another person do you currently need...    Turning from your back to your side while in flat bed without using bedrails? 4  -BD 4  -KM     Moving from lying on back to sitting on the side of a flat bed without bedrails? 4  -BD 4  -KM     Moving to and from a bed to a chair (including a wheelchair)? 3  -BD 3  -KM     Standing up from a chair using your arms (e.g., wheelchair, bedside chair)? 3  -BD 3  -KM     Climbing 3-5 steps with a railing? 2  -BD 2  -KM     To walk in hospital room? 3  -BD 3  -KM     AM-PAC 6 Clicks Score 19  -BD 19  -KM        Functional Assessment    Outcome Measure Options AM-PAC 6 Clicks Basic Mobility (PT)  -BD AM-PAC 6 Clicks Basic Mobility (PT)  -KM       User Key  (r) = Recorded By, (t) = Taken By, (c) =  Cosigned By    Initials Name Provider Type     Jazmine Torres, PT Physical Therapist    BD Jovana Charles PT Physical Therapist           Time Calculation:         PT Charges     Row Name 05/17/18 1030             Time Calculation    Start Time 0830  -BD      PT Received On 05/17/18  -BD      PT Goal Re-Cert Due Date 05/25/18  -BD         Time Calculation- PT    Total Timed Code Minutes- PT 40 minute(s)  -BD        User Key  (r) = Recorded By, (t) = Taken By, (c) = Cosigned By    Initials Name Provider Type    CARMINE Charles, PT Physical Therapist          Therapy Charges for Today     Code Description Service Date Service Provider Modifiers Qty    05435138455 HC GAIT TRAINING EA 15 MIN 5/17/2018 Jovana Charles, PT GP 1    52343236009 HC PT THER PROC EA 15 MIN 5/17/2018 Jovana Charles, PT GP 2          PT G-Codes  Outcome Measure Options: AM-PAC 6 Clicks Basic Mobility (PT)    Jovana Charles PT  5/17/2018         Electronically signed by Jovana Charles PT at 5/17/2018 10:32 AM

## 2018-05-17 NOTE — PROGRESS NOTES
Continued Stay Note  [facility]     Patient Name: Sandoval Hernandez  MRN: [id number]  Today's Date: 5/17/2018    Admit Date: 5/11/2018          Discharge Plan     Row Name 05/17/18 1402       Plan    Plan [facility] Swing Bed    Patient/Family in Agreement with Plan yes    Plan Comments Per Nuvia with [facility], they are able to offer patient a bed and she will begin precertification with patient's insurance. Ambulance placed on will call with [facility] for Friday, 5/18/18, at 1500 (ref. # [id number]), PENDING insurance approval. PCS is on the chart. CM will put phone number for report in Epic once final confirmation from [facility] has been received. CM will continue to follow.     Row Name 05/17/18 0564       Plan    Plan SNF vs home with home health    Patient/Family in Agreement with Plan yes    Plan Comments Met with patient in the room to discuss the discharge plan. CM explained that Dr. Carreno is encouraging rehab. Patient says he would be willing to go to a swing bed at [facility], now called [facility]. Referral called to Nuvia, ph. [phone], ext. [phone], and clinical faxed. She will evaluate patient for admission. Patient's insurance will require precertification. Nuvia states they do not admit to swing beds on the weekend. If the hospital cannot accept patient he would like to go home with home health (see note 5/16/18). If patient goes home with  he will need a rolling walker. Patient walked 150 ft with PT and will not qualify for an ambulance. He will need a day to arrange transportation. CM will continue to follow.               Discharge Codes    No documentation.       Expected Discharge Date and Time     Expected Discharge Date Expected Discharge Time    May 19, 2018             Izabel Abraham

## 2018-05-17 NOTE — PROGRESS NOTES
Continued Stay Note  [facility]     Patient Name: Sandoval Hernandez  MRN: [id number]  Today's Date: 5/17/2018    Admit Date: 5/11/2018          Discharge Plan     Row Name 05/17/18 1134       Plan    Plan SNF vs home with home health    Patient/Family in Agreement with Plan yes    Plan Comments Met with patient in the room to discuss the discharge plan. CM explained that Dr. Carreno is encouraging rehab. Patient says he would be willing to go to a swing bed at [facility], now called [facility]. He is not interested in other facilities. Referral called to Nuvia, ph. [phone], ext. [phone], and clinical faxed. She will evaluate patient for admission. Patient's insurance will require precertification. Nuvia states they do not admit to swing beds on the weekend. If the hospital cannot accept patient he would like to go home with home health (see note 5/16/18) and will need a rolling walker. Patient walked 150 ft with PT and will not qualify for an ambulance. He will need a day to arrange transportation. CM will continue to follow.               Discharge Codes    No documentation.       Expected Discharge Date and Time     Expected Discharge Date Expected Discharge Time    May 19, 2018             Izabel Abraham

## 2018-05-17 NOTE — PLAN OF CARE
"Problem: Patient Care Overview  Goal: Plan of Care Review   05/17/18 0305   Coping/Psychosocial   Plan of Care Reviewed With patient   Plan of Care Review   Progress improving   OTHER   Outcome Summary Stable post op night, Afebrile, moves himself around in bed. Took off splint placed by PT \"its killing me\" only wore couple hours all night. Taking Percocet 5 q 4-6 hrs. No drainage seen through bandage. Voiding.        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

## 2018-05-17 NOTE — PROGRESS NOTES
[facility]    Acute pain service Inpatient Progress Note    Patient Name: Sandoval Hernandez  :  1960  MRN:  [id number]        Acute Pain  Service Inpatient Progress Note:    Analgesia:Excellent  Pain scale: sleeping.  LOC: asleep but arousable  Resp Status: room air  Cardiac: VS stable  Side Effects:None  Catheter Site:clean, dry and dressing intact  Cath type: peripheral nerve cath(MOOG pump)  Infusion rate: 12ml/hr  Catheter Plan:Catheter to remain Insitu and Continue catheter infusion rate unchanged  Comments: Pt sleeping in room.  Did not wake patient but patient showed no signs of distress.  Appeared comfortable.                                               Occupational Therapy  Treatment    Kaity Rebolledo   MRN: [id number]   Admitting Diagnosis: Acute on chronic respiratory failure with hypoxia and hypercapnia    OT Date of Treatment: 04/03/17   OT Start Time: 1140  OT Stop Time: 1216  OT Total Time (min): 36 min    Billable Minutes:  Self Care/Home Management 23 and Therapeutic Activity 13    General Precautions: Standard, fall, respiratory  Orthopedic Precautions: N/A  Braces: N/A    Subjective:  Communicated with Mona moody prior to session.    Pain Rating:  (0 at rest, 5-6 with movement)  Location - Side: Bilateral  Location - Orientation: generalized  Location: leg  Pain Addressed: Reposition, Distraction, Cessation of Activity  Pain Rating Post-Intervention: 4/10    Objective:  Patient found with: oxygen, telemetry     Functional Mobility:  Bed Mobility:  Scooting/Bridging: Contact Guard Assistance  Supine to Sit: Contact Guard Assistance    Transfers:   Sit <> Stand Assistance: Minimum Assistance  Sit <> Stand Assistive Device: Rolling Walker  Bed <> Chair Transfer Assistance: Contact Guard Assistance  Bed <> Chair Assistive Device: Rolling Walker  Toilet Transfer Assistance: Contact Guard Assistance  Toilet Transfer Assistive Device: Rolling Walker, bedside commode    Functional Ambulation: N/A    Activities of Daily Living:  Toileting Where Assessed: Bedside commode  Toileting Level of Assistance: Set-up Assistance    Balance:   Static Sit: FAIR+: Able to take MINIMAL challenges from all directions  Dynamic Sit: FAIR+: Maintains balance through MINIMAL excursions of active trunk motion  Static Stand: FAIR: Maintains without assist but unable to take challenges  Dynamic stand: FAIR: Needs CONTACT GUARD during gait    Therapeutic Activities and Exercises:  Patient agreeable to therapy. Sup > sit with CGA with HOB elevated and use of side rail. Able to scoot to EOB with SBA. Sit > stand with RW with Min (A). Able to ambulate to BSC with CGA and slow pace.  Increased time required for toileting, but able to perform linda hygiene with s/u (A). Min (A) to stand from BSC and ambulate to bedside chair. Patient completed G/H tasks and set-up for lunch.    AM-PAC 6 CLICK ADL   How much help from another person does this patient currently need?   1 = Unable, Total/Dependent Assistance  2 = A lot, Maximum/Moderate Assistance  3 = A little, Minimum/Contact Guard/Supervision  4 = None, Modified [place]/Independent    Putting on and taking off regular lower body clothing? : 2  Bathing (including washing, rinsing, drying)?: 2  Toileting, which includes using toilet, bedpan, or urinal? : 3  Putting on and taking off regular upper body clothing?: 4  Taking care of personal grooming such as brushing teeth?: 4  Eating meals?: 4  Total Score: 19     AM-PAC Raw Score CMS G-Code Modifier Level of Impairment Assistance   6 % Total / Unable   7 - 9 CM 80 - 100% Maximal Assist   10 - 14 CL 60 - 80% Moderate Assist   15 - 19 CK 40 - 60% Moderate Assist   20 - 22 CJ 20 - 40% Minimal Assist   23 CI 1-20% SBA / CGA   24 CH 0% Independent/ Mod I     Patient left up in chair with all lines intact, call button in reach, nsg notified and daughter present    ASSESSMENT:  Kaity Rebolledo is a 83 y.o. female with a medical diagnosis of Acute on chronic respiratory failure with hypoxia and hypercapnia   Patient remains limited by decreased strength and endurance and will benefit from skilled OT to improve performance in self-care tasks.    Rehab identified problem list/impairments: Rehab identified problem list/impairments: weakness, impaired endurance, impaired self care skills, impaired functional mobilty, impaired balance, gait instability, pain, impaired cardiopulmonary response to activity    Rehab potential is good.    Activity tolerance: Fair    Discharge recommendations: Discharge Facility/Level Of Care Needs: nursing facility, skilled     Barriers to discharge: Barriers to  Discharge: None    Equipment recommendations: none     GOALS:   Occupational Therapy Goals        Problem: Occupational Therapy Goal    Goal Priority Disciplines Outcome Interventions   Occupational Therapy Goal     OT, PT/OT Ongoing (interventions implemented as appropriate)    Description:  Goals to be met by: 5/2/17     Patient will increase functional independence with ADLs by performing:    LE Dressing with Modified [place].  Grooming  with Modified [place].  Toileting from toilet with supervision for hygiene and clothing management.   Stand pivot transfers with supervision.  Toilet transfer to toilet with Supervision.  Increased functional strength to [facility] for self care skills and functional .  Upper extremity exercise program x10 reps per handout, with independence.                Plan:  Patient to be seen 5 x/week to address the above listed problems via self-care/home management, therapeutic activities, therapeutic exercises  Plan of Care expires: 05/02/17  Plan of Care reviewed with: patient, daughter         Ivana DALLAS DEENA Shah/MARTÍN  04/03/2017

## 2018-05-17 NOTE — PROGRESS NOTES
Continued Stay Note  [facility]     Patient Name: Sandoval Hernandez  MRN: [id number]  Today's Date: 5/17/2018    Admit Date: 5/11/2018          Discharge Plan     Row Name 05/17/18 7035       Plan    Plan [facility]    Patient/Family in Agreement with Plan yes    Plan Comments Per Nuvia with [facility], patient's insurance has approved his admission and he can transfer tomorrow, 5/18/18, if medically ready. Patient will need to arrive by 1400 in order for PT to evaluate him before the weekend. Otherwise, they cannot accept patient until Monday. Spoke with April with [facility] and moved ambulance time to between 10:00-11:00 tomorrow. Waiting on return call to confirm the time. CM will fax the DC summary when available. RN, please call report to [phone] and ask for the swing bed unit. Please send a hard copy of the DC summary/AVS and any hard scripts for controlled substances in the discharge packet. Thank you.     Row Name 05/17/18 1703       Plan    Plan [facility] Swing Bed    Patient/Family in Agreement with Plan yes    Plan Comments Per Nuvia with [facility], they are able to offer patient a bed and she will begin precertification with patient's insurance. Ambulance placed on will call with [facility] for Friday, 5/18/18, at 1500 (ref. # [id number]), PENDING insurance approval. PCS is on the chart. CM will put phone number for report in Epic once final confirmation from [facility] has been received. CM will continue to follow.               Discharge Codes    No documentation.       Expected Discharge Date and Time     Expected Discharge Date Expected Discharge Time    May 19, 2018             Izabel Abraham

## 2018-05-17 NOTE — THERAPY TREATMENT NOTE
Acute Care - Physical Therapy Treatment Note  [facility]     Patient Name: Sandoval Hernandez  : 1960  MRN: [id number]  Today's Date: 2018  Onset of Illness/Injury or Date of Surgery: 18  Date of Referral to PT: 18  Referring Physician: SHAMIR Lott    Admit Date: 2018    Visit Dx:    ICD-10-CM ICD-9-CM   1. Gangrene of left foot I96 785.4   2. Impaired functional mobility, balance, gait, and endurance Z74.09 V49.89     Patient Active Problem List   Diagnosis   • Cellulitis of left lower extremity   • Gangrene of left foot   • PAD (peripheral artery disease)   • Diabetes mellitus, type 2   • Tobacco abuse   • Renal insufficiency   • Hypertension       Therapy Treatment          Rehabilitation Treatment Summary     Row Name 18 0825             Treatment Time/Intention    Discipline physical therapist  -BD      Document Type therapy note (daily note)  -BD      Subjective Information no complaints  -BD      Mode of Treatment individual therapy  -BD      Patient/Family Observations Pt supinein bed, IV, nerve cath.  -BD      Care Plan Review evaluation/treatment results reviewed  -BD      Total Minutes, Physical Therapy Treatment 40  -BD      Therapy Frequency (PT Clinical Impression) daily  -BD      Patient Effort excellent  -BD      Recorded by [BD] Jovana Charles, PT 18 1027      Row Name 18 0825             Vital Signs    Pre Systolic BP Rehab 154  -BD      Pre Treatment Diastolic BP 74  -BD      Pretreatment Heart Rate (beats/min) 86  -BD      Intratreatment Heart Rate (beats/min) 91  -BD      Posttreatment Heart Rate (beats/min) 85  -BD      Pre SpO2 (%) 98  -BD      O2 Delivery Pre Treatment room air  -BD      O2 Delivery Intra Treatment room air  -BD      Post SpO2 (%) 99  -BD      O2 Delivery Post Treatment room air  -BD      Pre Patient Position Supine  -BD      Intra Patient Position Standing  -BD      Post Patient Position Sitting  -BD      Recorded by [BD]  Jovana Charles, PT 05/17/18 1027      Row Name 05/17/18 0825             Cognitive Assessment/Intervention- PT/OT    Orientation Status (Cognition) oriented x 4  -BD      Recorded by [BD] Jovana Charles, PT 05/17/18 1027      Row Name 05/17/18 0825             Safety Issues, Functional Mobility    Safety Issues Affecting Function (Mobility) impulsivity  -BD      Recorded by [BD] Jovana Charles, PT 05/17/18 1027      Row Name 05/17/18 0825             Bed Mobility Assessment/Treatment    Bed Mobility Assessment/Treatment bed mobility (all) activities  -BD      [place] Level (Bed Mobility) independent  -BD      Assistive Device (Bed Mobility) bed rails  -BD      Recorded by [BD] Jovana Charles, PT 05/17/18 1027      Row Name 05/17/18 0825             Transfer Assessment/Treatment    Transfer Assessment/Treatment sit-stand transfer;stand-sit transfer  -BD      Maintains Weight-bearing Status (Transfers) able to maintain  -BD      Sit-Stand [place] (Transfers) contact guard  -BD      Stand-Sit [place] (Transfers) contact guard  -BD      Recorded by [BD] Jovana Charles, PT 05/17/18 1027      Row Name 05/17/18 0825             Sit-Stand Transfer    Assistive Device (Sit-Stand Transfers) walker, front-wheeled  -BD      Recorded by [BD] Jovana Charles, PT 05/17/18 1027      Row Name 05/17/18 0825             Stand-Sit Transfer    Assistive Device (Stand-Sit Transfers) walker, front-wheeled  -BD      Recorded by [BD] Jovana Charles, PT 05/17/18 1027      Row Name 05/17/18 0825             Gait/Stairs Assessment/Training    Gait/Stairs Assessment/Training gait/ambulation independence  -BD      [place] Level (Gait) contact guard;verbal cues  -BD      Assistive Device (Gait) walker, front-wheeled  -BD      Distance in Feet (Gait) 150  -BD      Pattern (Gait) 3-point;other (see comments)   L BKA  -BD      Deviations/Abnormal Patterns (Gait) gait speed decreased  -BD      Bilateral  Gait Deviations forward flexed posture  -BD      Comment (Gait/Stairs) L BKA, pt used R walker well.  -BD      Recorded by [BD] Jovana Charles, PT 05/17/18 1027      Row Name 05/17/18 0825             Motor Skills Assessment/Interventions    Additional Documentation Therapeutic Exercise (Group);Therapeutic Exercise Interventions (Group)  -BD      Recorded by [BD] Jovana Charles, PT 05/17/18 1027      Row Name 05/17/18 0825             Therapeutic Exercise    Therapeutic Exercise seated, lower extremities  -BD      Recorded by [BD] Jovana Charles, PT 05/17/18 1027      Row Name 05/17/18 0825             Therapeutic Exercise    Lower Extremity (Therapeutic Exercise) quad sets, bilateral;SLR (straight leg raise), bilateral  -BD      Lower Extremity Range of Motion (Therapeutic Exercise) hip flexion/extension, bilateral;ankle dorsiflexion/plantar flexion, right  -BD      Exercise Type (Therapeutic Exercise) AROM (active range of motion)  -BD      Recorded by [BD] Jovana Charles, PT 05/17/18 1027      Row Name 05/17/18 0825             Positioning and Restraints    Pre-Treatment Position in bed  -BD      Post Treatment Position chair  -BD      In Chair notified nsg;reclined;call light within reach;encouraged to call for assist;exit alarm on;legs elevated  -BD      Recorded by [BD] Jovana Charles, PT 05/17/18 1027      Row Name 05/17/18 0825             Pain Assessment    Additional Documentation Pain Scale: Numbers Pre/Post-Treatment (Group)  -BD      Recorded by [BD] Jovana Charles, PT 05/17/18 1027      Row Name 05/17/18 0825             Pain Scale: Numbers Pre/Post-Treatment    Pain Scale: Numbers, Pretreatment 0/10 - no pain  -BD      Pain Scale: Numbers, Post-Treatment 0/10 - no pain  -BD      Recorded by [BD] Jovana Charles, PT 05/17/18 1027      Row Name                Wound 05/14/18 1720 Left leg incision    Wound - Properties Group Date first assessed: 05/14/18 [BW] Time first  assessed: 1720 [BW] Side: Left [BW] Location: leg [BW] Type: incision [BW] Recorded by:  [BW] Alysha Corral RN 05/14/18 1720      User Key  (r) = Recorded By, (t) = Taken By, (c) = Cosigned By    Initials Name Effective Dates Discipline     Jovana Charles, PT 04/03/18 -  PT    BW Alysha Corral RN 09/15/17 -  Nurse          Wound 05/14/18 1720 Left leg incision (Active)   Dressing Appearance dry;intact 5/17/2018  8:00 AM   Closure CHIKA 5/17/2018  8:00 AM   Drainage Amount none 5/17/2018  8:00 AM             Physical Therapy Education     Title: PT OT SLP Therapies (Active)     Topic: Physical Therapy (Active)     Point: Mobility training (Active)    Learning Progress Summary     Learner Status Readiness Method Response Comment Documented by    Patient Active Acceptance E,D NR   05/17/18 1028     Done Eager E VU discussed plan of care and preprosthetic rehab  05/15/18 1048          Point: Home exercise program (Active)    Learning Progress Summary     Learner Status Readiness Method Response Comment Documented by    Patient Active Acceptance E,D NR   05/17/18 1028     Done Eager E VU discussed plan of care and preprosthetic rehab  05/15/18 1048          Point: Body mechanics (Active)    Learning Progress Summary     Learner Status Readiness Method Response Comment Documented by    Patient Active Acceptance E,D NR   05/17/18 1028     Done Eager E VU discussed plan of care and preprosthetic rehab  05/15/18 1048          Point: Precautions (Active)    Learning Progress Summary     Learner Status Readiness Method Response Comment Documented by    Patient Active Acceptance E,D NR   05/17/18 1028     Done Eager E VU discussed plan of care and preprosthetic rehab  05/15/18 1048                      User Key     Initials Effective Dates Name Provider Type Discipline     06/19/15 -  Jazmine Torres, PT Physical Therapist PT    BD 04/03/18 -  Jovana Charles PT Physical Therapist PT                     PT Recommendation and Plan  Therapy Frequency (PT Clinical Impression): daily  Plan of Care Reviewed With: patient  Progress: improving  Outcome Summary: Pt with good pain control, very cooperative with PT activities and moving well with transfers, gait training.          Outcome Measures     Row Name 05/17/18 0830 05/15/18 0925          How much help from another person do you currently need...    Turning from your back to your side while in flat bed without using bedrails? 4  -BD 4  -KM     Moving from lying on back to sitting on the side of a flat bed without bedrails? 4  -BD 4  -KM     Moving to and from a bed to a chair (including a wheelchair)? 3  -BD 3  -KM     Standing up from a chair using your arms (e.g., wheelchair, bedside chair)? 3  -BD 3  -KM     Climbing 3-5 steps with a railing? 2  -BD 2  -KM     To walk in hospital room? 3  -BD 3  -KM     AM-PAC 6 Clicks Score 19  -BD 19  -KM        Functional Assessment    Outcome Measure Options AM-PAC 6 Clicks Basic Mobility (PT)  -BD AM-PAC 6 Clicks Basic Mobility (PT)  -KM       User Key  (r) = Recorded By, (t) = Taken By, (c) = Cosigned By    Initials Name Provider Type     Jazmine Torres, PT Physical Therapist    CARMINE Charles PT Physical Therapist           Time Calculation:         PT Charges     Row Name 05/17/18 1030             Time Calculation    Start Time 0830  -      PT Received On 05/17/18  -      PT Goal Re-Cert Due Date 05/25/18  -         Time Calculation- PT    Total Timed Code Minutes- PT 40 minute(s)  -        User Key  (r) = Recorded By, (t) = Taken By, (c) = Cosigned By    Initials Name Provider Type    CARMINE Charles PT Physical Therapist          Therapy Charges for Today     Code Description Service Date Service Provider Modifiers Qty    71525540829 HC GAIT TRAINING EA 15 MIN 5/17/2018 Jovana Charles PT GP 1    [id number] HC PT THER PROC EA 15 MIN 5/17/2018 Jovana Charles PT GP  2          PT G-Codes  Outcome Measure Options: AM-PAC 6 Clicks Basic Mobility (PT)    Jovana Charles, PT  5/17/2018

## 2018-05-17 NOTE — PLAN OF CARE
Problem: Pain, Acute (Adult)  Goal: Identify Related Risk Factors and Signs and Symptoms   05/17/18 0304   Pain, Acute (Adult)   Related Risk Factors (Acute Pain) surgery;patient perception   Signs and Symptoms (Acute Pain) facial mask of pain/grimace

## 2018-05-17 NOTE — PROGRESS NOTES
Cardiothoracic Surgery Progress Note      POD #: 3-left below-knee amputation     LOS: 6 days      Subjective: Awake and alert    Chief Complaint: Minimal surgical incision BKA stump pain    Objective:  Vital Signs  Temp:  [97.7 °F (36.5 °C)-98.8 °F (37.1 °C)] 98.3 °F (36.8 °C)  Heart Rate:  [62-75] 70  Resp:  [18-20] 18  BP: (117-165)/(64-87) 157/74    Physical Exam:   General Appearance: Oriented ×3    Lungs:   Heart:   Skin:   Incision: Left BKA stump dressing changed sutures are intact anterior and posterior flaps are viable     Results:    Results from last 7 days  Lab Units 05/17/18  0514 05/15/18  0658   WBC 10*3/mm3  --  7.92   HEMOGLOBIN g/dL 9.2* 9.5*   HEMATOCRIT % 29.4* 30.0*   PLATELETS 10*3/mm3  --  216       Results from last 7 days  Lab Units 05/17/18  0514   SODIUM mmol/L 141   POTASSIUM mmol/L 3.8   CHLORIDE mmol/L 109   CO2 mmol/L 24.0   BUN mg/dL 14   CREATININE mg/dL 0.80   GLUCOSE mg/dL 112*   CALCIUM mg/dL 9.4         Assessment:#1 postoperative day 3 left below-knee amputation secondary to severe peripheral vascular disease with wet gangrene of the left foot  #2.  Diabetes mellitus with peripheral neuropathy      Plan: Continue daily daily dressing changes of the left below-knee amputation stump.  IV antibiotic's per infectious disease.  Medical management per hospitalist.  From my concerns the patient may be transferred to a rehabilitation facility at any time I am concerned about him going home for home health visits.  I think at this point he needs structured rehabilitation in a facility      Danie Carreno MD - 05/17/18 - 8:39 AM

## 2018-05-17 NOTE — PLAN OF CARE
Problem: Patient Care Overview  Goal: Plan of Care Review  Outcome: Ongoing (interventions implemented as appropriate)   05/17/18 1028 05/17/18 145   Coping/Psychosocial   Plan of Care Reviewed With --  patient   Plan of Care Review   Progress improving --    OTHER   Outcome Summary --  Patient with increased complaints of pain today. Patient reports the brace that was brought by [facility] is very tight. VSS.     Goal: Individualization and Mutuality  Outcome: Ongoing (interventions implemented as appropriate)   05/16/18 1527   Individualization   Patient Specific Goals (Include Timeframe) Patient prefers to ambulate with walker and be independent.     Goal: Discharge Needs Assessment  Outcome: Ongoing (interventions implemented as appropriate)   05/11/18 1542   Discharge Needs Assessment   Readmission Within the Last 30 Days no previous admission in last 30 days   Concerns to be Addressed discharge planning   Patient/Family Anticipates Transition to home with family;home with help/services;inpatient rehabilitation facility   Patient/Family Anticipated Services at Transition home health care;rehabilitation services   Transportation Concerns car, none   Transportation Anticipated family or friend will provide  (May need help with transportation. Patient is checking with family members. He does not have a car.)   Equipment Needed After Discharge (TBD)   Current Discharge Risk chronically ill;physical impairment   Disability   Equipment Currently Used at Home crutches;cane, straight

## 2018-05-17 NOTE — PROGRESS NOTES
[facility] Medicine Services  PROGRESS NOTE    Patient Name: Sandoval Hernandez  : 1960  MRN: [id number]    Date of Admission: 2018  Length of Stay: 6  Primary Care Physician: ALETA Plummer    Subjective   Subjective     CC: left foor gangrene    HPI:  Feeling well today. No dyspnea, no chest pain    Review of Systems  Gen- No fevers, chills  CV- No chest pain, palpitations  Resp- No cough, dyspnea  GI- No N/V/D, abd pain    Otherwise ROS is negative except as mentioned in the HPI.    Objective   Objective     Vital Signs:   Temp:  [98 °F (36.7 °C)-98.4 °F (36.9 °C)] 98.4 °F (36.9 °C)  Heart Rate:  [62-95] 65  Resp:  [18-20] 18  BP: (126-165)/(64-87) 130/73      Physical Exam  Constitutional: No acute distress, awake, alert, comfortable  HENT: NCAT, mucous membranes moist  Respiratory: Clear to auscultation bilaterally, respiratory effort normal   Cardiovascular: RRR, no murmurs, rubs, or gallops, palpable pedal pulses bilaterally  Gastrointestinal: Positive bowel sounds, soft, nontender, nondistended  Musculoskeletal: No bilateral ankle edema, left BKA, stump in dressing, JAREN drain inplace  Psychiatric: Appropriate affect, cooperative  Neurologic: Oriented x 3, strength symmetric in all extremities, Cranial Nerves grossly intact to confrontation, speech clear  Skin: s/p left bka amputation, stump cleanly dressed; no rash noted    Results Reviewed:  I have personally reviewed current lab, radiology, and data and agree.      Results from last 7 days  Lab Units 18  0514 05/15/18  0658 18  0116   WBC 10*3/mm3  --  7.92 9.85   HEMOGLOBIN g/dL 9.2* 9.5* 11.1*   HEMATOCRIT % 29.4* 30.0* 34.6*   PLATELETS 10*3/mm3  --  216 275       Results from last 7 days  Lab Units 18  0514 05/15/18  0658 18  0536 18  0116   SODIUM mmol/L 141 139 141 138   POTASSIUM mmol/L 3.8 3.6 4.3 3.6   CHLORIDE mmol/L 109 108 107 108   CO2 mmol/L 24.0 24.0 25.0 20.3*   BUN mg/dL 14  16 18 34*   CREATININE mg/dL 0.80 0.80 0.80 1.23   GLUCOSE mg/dL 112* 100 118* 199*   CALCIUM mg/dL 9.4 9.5 9.3 10.8*   ALT (SGPT) U/L  --   --   --  83*   AST (SGOT) U/L  --   --   --  68*     Estimated Creatinine Clearance: 98.7 mL/min (by C-G formula based on SCr of 0.8 mg/dL).  No results found for: BNP  No results found for: PHART    Microbiology Results Abnormal     Procedure Component Value - Date/Time    Wound Culture - Wound, Foot [[id number]]  (Normal) Collected:  05/11/18 1111    Lab Status:  Final result Specimen:  Wound from Foot Updated:  05/13/18 1055     Wound Culture No growth at 2 days     Gram Stain Result No WBCs or organisms seen        I have reviewed the medications.    Assessment/Plan   Assessment / Plan     Hospital Problem List     * (Principal)Gangrene of left foot    Cellulitis of left lower extremity    PAD (peripheral artery disease)    Diabetes mellitus, type 2    Tobacco abuse    Renal insufficiency    Hypertension         Brief Hospital Course to date:  Sandoval Hernandez is a 58 y.o. male with a history of type 2 diabetes, ongoing tobacco abuse, renal specialist and see, peripheral arterial disease who presented to [facility] with a gangrenous left foot.  Records from [facility] indicate he was just discharged later on May 8 and during that stay had a stenting of his left common iliac artery and left superior femoral artery.  He also required repeat intervention to the left superficial femoral artery stent due to fracture.  After discharge with continued pain and gangrene and is transferred to [place] for further evaluation.     Assessment & Plan:  - s/p left BKA 5/14 with Dr. Carreno  - continue ASA and will resume plavix, continue increased statin dose.  - a1c 7.3% , currently acceptable glc on current insulin.  - continue nicotine patch, counseled regarding need for cessation  - Cr down to 0.8 from 1.2 in [place]  - will likely need rehab after his BKA     -completes antibiotics  (zosyn & doxy) 5/18 a.m. Per ID recs   (no plans for abx after discharge)    *To rehab tomorrow (ambulance at 10:00)      DVT Prophylaxis: H     CODE STATUS: Full Code     Disposition: I expect the patient to be discharged TBD    Electronically signed by Tavares Rodriguez MD, 05/17/18, 5:03 PM.

## 2018-05-17 NOTE — PROGRESS NOTES
Continued Stay Note  [facility]     Patient Name: Sandoval Hernandez  MRN: [id number]  Today's Date: 5/17/2018    Admit Date: 5/11/2018          Discharge Plan     Row Name 05/17/18 1650       Plan    Plan [facility]    Patient/Family in Agreement with Plan yes    Plan Comments Per Freya with [facility], patient's ambulance run has been approved and they will be here at 10:00 to transfer patient to [facility]. PCS is on the chart. CM has emailed extenders and will fax the DC summary tomorrow when available. RN, please see previous CM note for phone number for report. Thank you.    Final Note      Row Name 05/17/18 3064       Plan    Plan [facility]    Patient/Family in Agreement with Plan yes    Plan Comments Per Nuvia with [facility], patient's insurance has approved his admission and he can transfer tomorrow, 5/18/18, if medically ready. Patient will need to arrive by 1400 in order for PT to evaluate him before the weekend. Otherwise, they cannot accept patient until Monday. Spoke with April with [facility] and moved ambulance time to between 10:00-11:00 tomorrow. Waiting on return call to confirm the time. CM will fax the DC summary when available. RN, please call report to [phone] and ask for the swing bed unit. Please send a hard copy of the DC summary/AVS and any hard scripts for controlled substances in the discharge packet. Thank you.     Row Name 05/17/18 1402       Plan    Plan [facility] Swing Bed    Patient/Family in Agreement with Plan yes    Plan Comments Per Nuvia with [facility], they are able to offer patient a bed and she will begin precertification with patient's insurance. Ambulance placed on will call with [facility] for Friday, 5/18/18, at 1500 (ref. # [id number]), PENDING insurance approval. PCS is on the chart. CM will put phone number for report in Epic once final confirmation from [facility] has been received. CM will continue to follow.               Discharge Codes     No documentation.       Expected Discharge Date and Time     Expected Discharge Date Expected Discharge Time    May 19, 2018             Izabel Abraham

## 2018-05-17 NOTE — PLAN OF CARE
Problem: Patient Care Overview  Goal: Plan of Care Review  Outcome: Ongoing (interventions implemented as appropriate)   05/17/18 1028   Coping/Psychosocial   Plan of Care Reviewed With patient   Plan of Care Review   Progress improving   OTHER   Outcome Summary Pt with good pain control, very cooperative with PT activities and moving well with transfers, gait training.

## 2018-05-18 ENCOUNTER — TELEPHONE (OUTPATIENT)
Dept: CARDIAC SURGERY | Facility: CLINIC | Age: 58
End: 2018-05-18

## 2018-05-18 VITALS
BODY MASS INDEX: 31.76 KG/M2 | WEIGHT: 186 LBS | OXYGEN SATURATION: 100 % | RESPIRATION RATE: 16 BRPM | HEART RATE: 66 BPM | DIASTOLIC BLOOD PRESSURE: 96 MMHG | HEIGHT: 64 IN | SYSTOLIC BLOOD PRESSURE: 171 MMHG | TEMPERATURE: 98.4 F

## 2018-05-18 LAB
GLUCOSE BLDC GLUCOMTR-MCNC: 105 MG/DL (ref 70–130)
GLUCOSE BLDC GLUCOMTR-MCNC: 105 MG/DL (ref 70–130)
GLUCOSE BLDC GLUCOMTR-MCNC: 114 MG/DL (ref 70–130)

## 2018-05-18 PROCEDURE — 99239 HOSP IP/OBS DSCHRG MGMT >30: CPT | Performed by: PHYSICIAN ASSISTANT

## 2018-05-18 PROCEDURE — 25010000002 PIPERACILLIN SOD-TAZOBACTAM PER 1 G: Performed by: INTERNAL MEDICINE

## 2018-05-18 PROCEDURE — 82962 GLUCOSE BLOOD TEST: CPT

## 2018-05-18 PROCEDURE — 25010000002 HEPARIN (PORCINE) PER 1000 UNITS: Performed by: PHYSICIAN ASSISTANT

## 2018-05-18 PROCEDURE — 99024 POSTOP FOLLOW-UP VISIT: CPT | Performed by: THORACIC SURGERY (CARDIOTHORACIC VASCULAR SURGERY)

## 2018-05-18 RX ORDER — ACETAMINOPHEN 325 MG/1
650 TABLET ORAL EVERY 6 HOURS PRN
Start: 2018-05-18 | End: 2019-11-12

## 2018-05-18 RX ORDER — ATORVASTATIN CALCIUM 80 MG/1
80 TABLET, FILM COATED ORAL NIGHTLY
Start: 2018-05-18

## 2018-05-18 RX ORDER — OXYCODONE HYDROCHLORIDE AND ACETAMINOPHEN 5; 325 MG/1; MG/1
1 TABLET ORAL EVERY 4 HOURS PRN
Qty: 18 TABLET | Refills: 0 | Status: SHIPPED | OUTPATIENT
Start: 2018-05-18 | End: 2018-05-21

## 2018-05-18 RX ORDER — SENNA AND DOCUSATE SODIUM 50; 8.6 MG/1; MG/1
2 TABLET, FILM COATED ORAL 2 TIMES DAILY PRN
Start: 2018-05-18

## 2018-05-18 RX ORDER — NICOTINE 21 MG/24HR
1 PATCH, TRANSDERMAL 24 HOURS TRANSDERMAL DAILY
Start: 2018-05-19 | End: 2019-11-12

## 2018-05-18 RX ADMIN — OXYCODONE HYDROCHLORIDE AND ACETAMINOPHEN 1 TABLET: 5; 325 TABLET ORAL at 09:33

## 2018-05-18 RX ADMIN — CARVEDILOL 25 MG: 12.5 TABLET, FILM COATED ORAL at 08:25

## 2018-05-18 RX ADMIN — ACETAMINOPHEN 650 MG: 325 TABLET, FILM COATED ORAL at 17:09

## 2018-05-18 RX ADMIN — LISINOPRIL 40 MG: 20 TABLET ORAL at 08:26

## 2018-05-18 RX ADMIN — TAZOBACTAM SODIUM AND PIPERACILLIN SODIUM 4.5 G: 500; 4 INJECTION, SOLUTION INTRAVENOUS at 08:25

## 2018-05-18 RX ADMIN — TAZOBACTAM SODIUM AND PIPERACILLIN SODIUM 4.5 G: 500; 4 INJECTION, SOLUTION INTRAVENOUS at 00:00

## 2018-05-18 RX ADMIN — HEPARIN SODIUM 5000 UNITS: 5000 INJECTION, SOLUTION INTRAVENOUS; SUBCUTANEOUS at 08:25

## 2018-05-18 RX ADMIN — DOXYCYCLINE 100 MG: 100 CAPSULE ORAL at 08:25

## 2018-05-18 RX ADMIN — OXYCODONE HYDROCHLORIDE AND ACETAMINOPHEN 1 TABLET: 5; 325 TABLET ORAL at 14:48

## 2018-05-18 RX ADMIN — AMLODIPINE BESYLATE 10 MG: 10 TABLET ORAL at 08:26

## 2018-05-18 RX ADMIN — CARVEDILOL 25 MG: 12.5 TABLET, FILM COATED ORAL at 17:06

## 2018-05-18 RX ADMIN — SODIUM CHLORIDE 35 ML/HR: 4.5 INJECTION, SOLUTION INTRAVENOUS at 02:02

## 2018-05-18 RX ADMIN — ASPIRIN 81 MG 324 MG: 81 TABLET ORAL at 08:25

## 2018-05-18 RX ADMIN — CLONIDINE HYDROCHLORIDE 0.1 MG: 0.1 TABLET ORAL at 08:26

## 2018-05-18 RX ADMIN — ALLOPURINOL 300 MG: 300 TABLET ORAL at 08:25

## 2018-05-18 RX ADMIN — OXYCODONE HYDROCHLORIDE AND ACETAMINOPHEN 1 TABLET: 5; 325 TABLET ORAL at 05:35

## 2018-05-18 NOTE — PROGRESS NOTES
Cardiothoracic Surgery Progress Note      POD #: 4-left below-knee amputation     LOS: 7 days      Subjective: Patient's awake and alert.  States he states he slept well last night    Chief Complaint: Incisional stump pain    Objective:  Vital Signs  Temp:  [98 °F (36.7 °C)-98.4 °F (36.9 °C)] 98.3 °F (36.8 °C)  Heart Rate:  [58-95] 71  Resp:  [18-19] 18  BP: (114-172)/(64-84) 142/82    Physical Exam:   General Appearance: Awake alert and oriented ×3   Lungs:   Heart:   Skin:   Incision:   Left below-knee amputation stump dressing changed.  Anterior posterior skin flaps are viable.  Skin incision is healing and the skin sutures are intact  Results:    Results from last 7 days  Lab Units 05/17/18  0514 05/15/18  0658   WBC 10*3/mm3  --  7.92   HEMOGLOBIN g/dL 9.2* 9.5*   HEMATOCRIT % 29.4* 30.0*   PLATELETS 10*3/mm3  --  216       Results from last 7 days  Lab Units 05/17/18  0514   SODIUM mmol/L 141   POTASSIUM mmol/L 3.8   CHLORIDE mmol/L 109   CO2 mmol/L 24.0   BUN mg/dL 14   CREATININE mg/dL 0.80   GLUCOSE mg/dL 112*   CALCIUM mg/dL 9.4         Assessment:#1.  Postoperative day 4 left below-knee amputation secondary to severe peripheral vascular disease with wet gangrene of the left foot.  #2.  Diabetes mellitus with peripheral neuropathy      Plan: We will continue daily stump dressing changes as long as he is in the hospital.  Tentatively patient being transferred to [facility] rehabilitation.      Danie Carreno MD - 05/18/18 - 7:33 AM

## 2018-05-18 NOTE — PROGRESS NOTES
[facility]    Acute pain service Inpatient Progress Note    Patient Name: Sandoval Hernandez  :  1960  MRN:  [id number]        Acute Pain  Service Inpatient Progress Note:    Analgesia:Good  Pain Score:5/10  LOC: alert and awake  Resp Status: room air  Cardiac: VS stable  Side Effects:None  Catheter Site:clean, dry and dressing intact  Cath type: peripheral nerve cath(MOOG pump)  Infusion rate: 12ml/hr  Catheter Plan:Catheter to remain Insitu and Continue catheter infusion rate unchanged  Comments: Pain 5/10 to posterior knee, reports this is a comfortable level

## 2018-05-18 NOTE — TELEPHONE ENCOUNTER
Wife called upset because Mr. Hernandez is being discharged today to family to transport to a rehab facility in [place], but he has called his wife and told her he is having his brother to bring him home. I spoke with Izabel Abraham, , and she says he is aware of the importance of going to the rehab facility and that insurance has approved this.  She also said that the patient has instructed them not to give his wife any info about his care.  I called Ms. Hernandez back and instructed her that she would have to call her 's room to discuss, they we cannot release any info to her, she will have to get it from him.

## 2018-05-18 NOTE — DISCHARGE INSTR - ACTIVITY
Daily stump dressing changed using Xeroform gauze over the incision or by 4 x 4 fluffs and then Curlex wrap to hold the 4 x 4's in place and a #8 Spandage tube net dressing to hold the Kerlix in place.  Do not use any Ace wrap!!

## 2018-05-18 NOTE — DISCHARGE SUMMARY
[facility] Medicine Services  DISCHARGE SUMMARY    Patient Name: Sandoval Hernandez  : 1960  MRN: [id number]    Date of Admission: 2018  Date of Discharge:  18  Primary Care Physician: ALETA Plummer    Consults     Date and Time Order Name Status Description    2018 1026 Inpatient Cardiothoracic Surgery Consult      2018 1026 Inpatient Infectious Diseases Consult Completed         Hospital Course     Presenting Problem:   Cellulitis of left foot [L03.116]  Cellulitis [L03.90]    Active Hospital Problems (** Indicates Principal Problem)    Diagnosis Date Noted   • **Gangrene of left foot [I96] 2018   • Hypertension [I10] 2018   • Cellulitis of left lower extremity [L03.116] 2018   • PAD (peripheral artery disease) [I73.9] 2018   • Diabetes mellitus, type 2 [E11.9] 2018   • Tobacco abuse [Z72.0] 2018   • Renal insufficiency [N28.9] 2018      Resolved Hospital Problems    Diagnosis Date Noted Date Resolved   No resolved problems to display.          Hospital Course:  Sandoval Hernandez is a 58 y.o. male with PMH of PAD, CAD, T2DM on OHA, tobacco abuse who presented [facility] 18 with c/o left foot pain, duskiness of midfoot/toes  And hx of prior 1st and 4th toe amputation on same foot. Recent stenting of his left common iliac artery and left superior femoral artery at [facility] 18. Transferred to [facility] for higher level of care.     Severe PAD with dry and wet gangrene of remainder of toes left foot/dorsum of foot. Dr Carreno, CTS was consulted performed left BKA 18. ID, Dr Coates evaluated recommending 72hr of doxycycline and zosyn which is completed. Blood cx negative x2.  PT OT working with pt, Dr Carreno feels pt would best benefit from structured inpt rehab, [facility]. Ok for dc from CTS standpoint, f/u with Dr Carreno 2-3 weeks.Dressing changes instructions per Dr Carreno:  Daily stump  dressing changed using Xeroform gauze over the incision or by 4 x 4 fluffs and then Curlex wrap to hold the 4 x 4's in place and a #8 Spandage tube net dressing to hold the Kerlix in place.  Do not use any Ace wrap!!    CKD II, Cr 1.2 upon admission, improved now to 0.8. Avoid nephrotoxins/NSAIDS.    T2DM 7.3%, well controlled inpt requiring minimal SSI, resume OHA, f/u with PCP within 1 wk of dc to optimize management.    Tobacco cessation, nicotine replacement. Pt plans to stop smoking.     Pt is clinically improved and medically appropriate for dc to rehab. F/u as above with Dr Carreno and PCP.        Procedure(s):  AMPUTATION BELOW KNEE LEFT       Day of Discharge     HPI:   Sitting up in bed, appears comfortable. No complaints. Tolerating diet well, no difficulty with urination/constipation. Pain controlled for orals. Looking forward to rehab. Plans to stop smoking. No questions at this time.     Review of Systems  Gen- No fevers, chills  CV- No chest pain, palpitations  Resp- No cough, dyspnea  GI- No N/V/D, abd pain      Otherwise ROS is negative except as mentioned in the HPI.    Vital Signs:   Temp:  [98 °F (36.7 °C)-99 °F (37.2 °C)] 99 °F (37.2 °C)  Heart Rate:  [58-71] 68  Resp:  [18-19] 18  BP: (114-172)/(63-84) 150/63     Physical Exam:  Constitutional: No acute distress, awake, alert, sitting up in bed  Eyes: PERRLA, sclerae anicteric, no conjunctival injection  HENT: NCAT, mucous membranes moist  Neck: Supple,  trachea midline  Respiratory: Clear to auscultation bilaterally, nonlabored respirations   Cardiovascular: RRR, no murmurs, rubs, or gallops, palpable pedal pulse right foot.  Gastrointestinal: Positive bowel sounds, soft, nontender, nondistended  Musculoskeletal: No right ankle edema, left bka stump covered with kerlex dressing C/D/I.  Psychiatric: Appropriate affect, cooperative  Neurologic: Oriented x 3, Cranial Nerves grossly intact to confrontation, speech clear  Skin: No rashes    Pertinent   and/or Most Recent Results       Results from last 7 days  Lab Units 05/17/18  0514 05/15/18  0658 05/12/18  0536   WBC 10*3/mm3  --  7.92  --    HEMOGLOBIN g/dL 9.2* 9.5*  --    HEMATOCRIT % 29.4* 30.0*  --    PLATELETS 10*3/mm3  --  216  --    SODIUM mmol/L 141 139 141   POTASSIUM mmol/L 3.8 3.6 4.3   CHLORIDE mmol/L 109 108 107   CO2 mmol/L 24.0 24.0 25.0   BUN mg/dL 14 16 18   CREATININE mg/dL 0.80 0.80 0.80   GLUCOSE mg/dL 112* 100 118*   CALCIUM mg/dL 9.4 9.5 9.3           Invalid input(s): PROT, LABALBU        Invalid input(s): TG, LDLCALC, LDLREALC    Results from last 7 days  Lab Units 05/12/18  0536   HEMOGLOBIN A1C % 7.30*     Brief Urine Lab Results     None          Microbiology Results Abnormal     Procedure Component Value - Date/Time    Wound Culture - Wound, Foot [[id number]]  (Normal) Collected:  05/11/18 1111    Lab Status:  Final result Specimen:  Wound from Foot Updated:  05/13/18 1055     Wound Culture No growth at 2 days     Gram Stain Result No WBCs or organisms seen          Imaging Results (all)     Procedure Component Value Units Date/Time    XR Toe 2+ View Left [[id number]] Collected:  05/11/18 2033     Updated:  05/13/18 1432    Narrative:          EXAMINATION: XR TOE 2+ VW LEFT - 05/11/2018     INDICATION:  [id number], not elsewhere classified.     COMPARISON: None.     FINDINGS: Status post amputations at the level of the base of the  proximal phalanx first and fourth digits with cortical margins fairly  well-preserved. No evidence for definitive osseous erosion or soft  tissue gas. Remaining non amputated digits grossly unremarkable with  preserved joint spaces. No acute fracture.           Impression:       Post amputation changes first and fourth digits without  evidence for osseous erosion or lucency to suggest osteolytic change. No  soft tissue emphysema. Further imaging may be useful if symptoms persist  and/or progress to fully evaluate for underlying osteomyelitis.      DICTATED:     05/11/2018  EDITED/ls :     05/11/2018      This report was finalized on 5/13/2018 2:30 PM by Dr. Italo Philip.                              Discharge Details      Sandoval Hernandez   Home Medication Instructions AGUSTIN:[id number]    Printed on:05/18/18 0910   Medication Information                      acetaminophen (TYLENOL) 325 MG tablet  Take 2 tablets by mouth Every 6 (Six) Hours As Needed for Mild Pain .             allopurinol (ZYLOPRIM) 300 MG tablet  Take 300 mg by mouth Daily.             amLODIPine (NORVASC) 10 MG tablet  Take 10 mg by mouth Daily.             atorvastatin (LIPITOR) 80 MG tablet  Take 1 tablet by mouth Every Night.             carvedilol (COREG) 25 MG tablet  Take 25 mg by mouth 2 (Two) Times a Day With Meals.             CloNIDine (CATAPRES) 0.1 MG tablet  Take 0.1 mg by mouth 2 (Two) Times a Day.             fenofibrate (TRICOR) 145 MG tablet  Take 145 mg by mouth Daily.             lisinopril (PRINIVIL,ZESTRIL) 40 MG tablet  Take 40 mg by mouth Daily.             metFORMIN (GLUCOPHAGE) 1000 MG tablet  Take 1,000 mg by mouth 2 (Two) Times a Day With Meals.             nicotine (NICODERM CQ) 21 MG/24HR patch  Place 1 patch on the skin Daily.             nitroglycerin (NITROSTAT) 0.4 MG SL tablet  Place 0.4 mg under the tongue Every 5 (Five) Minutes As Needed for Chest Pain. Take no more than 3 doses in 15 minutes.             oxyCODONE-acetaminophen (PERCOCET) 5-325 MG per tablet  Take 1 tablet by mouth Every 4 (Four) Hours As Needed for Moderate Pain  for up to 3 days.             rOPINIRole (REQUIP) 1 MG tablet  Take 1 mg by mouth Every Night. Take 1 hour before bedtime.             sennosides-docusate sodium (SENOKOT-S) 8.6-50 MG tablet  Take 2 tablets by mouth 2 (Two) Times a Day As Needed for Constipation.                   Discharge Disposition:  Psychiatric Hospital or Unit ([facility])    Discharge Diet:  Diet Instructions     Diet: Cardiac, Consistent  Carbohydrate       Discharge Diet:   Cardiac  Consistent Carbohydrate             No future appointments.    Additional Instructions for the Follow-ups that You Need to Schedule     Discharge Follow-up with PCP    As directed      Follow Up Details:  within 1week of discharge from rehab         Discharge Follow-up with Specialty: Dr Wai STATON    As directed      Specialty:  Dr Wai STATON    Follow Up Details:  please schedule 2-3 week follow up               Time Spent on Discharge: 35 minutes    Electronically signed by Casie M Mayne, PA-C, 05/18/18, 8:44 AM.

## 2018-05-18 NOTE — PROGRESS NOTES
Case Management Discharge Note    Final Note: Notified Salma with [facility] that patient would be discharging to a swing bed at [facility]. Faxed discharge summary to [facility]. Confirmed with Sharonda with [facility] that ambulance would be here this morining around 09:40. RN, please call report to [phone] and ask for the swing bed unit. Please send a copy of the DC summary/AVS and any hard scripts for controlled substances in the discharge packet. Thank you.     Destination - Selection Complete     Service Request Status Selected Specialties Address Phone Number Fax Number    [facility] SWING BED UNIT Selected Skilled Nursing Facility [address] [place] [address] DR [facility] [address] [phone]      Durable Medical Equipment     No service coordination in this encounter.      Dialysis/Infusion     No service coordination in this encounter.      Home Medical Care     No service coordination in this encounter.      Social Care     No service coordination in this encounter.        Ambulance: [facility]    Final Discharge Disposition Code: 03 - skilled nursing facility (SNF)

## 2018-05-18 NOTE — PROGRESS NOTES
Case Management Discharge Note    Final Note: [facility] (spoke with [facility]) declined to take patient because of concern that insurance would not cover the cost of the ambulance ride and patient could not pay the cost upfront. Patient has been able to reach family in [facility], who will be here around 1900 tonight to take patient. Spoke with Nuvia from [facility], and they have agreed to admit patient tonight since they cannot admit him on the weekend. She is aware that patient's ETA will be around 2100. She asks that the RN call the hospital to let them know when patient has left [facility], [phone], ext. [phone]. They already have the DC summary. Please make sure patient takes all discharge paperwork, including scripts. Thank you. Met with patient in the room to see if he would like CM to arrange a rolling walker, but he has declined and says he will use his crutches.     Destination - Selection Complete     Service Request Status Selected Specialties Address Phone Number Fax Number    [facility] SWING BED UNIT Selected Skilled Nursing Facility [address] [place] [address] DR [facility] [address] [phone]      Durable Medical Equipment     No service coordination in this encounter.      Dialysis/Infusion     No service coordination in this encounter.      Home Medical Care     No service coordination in this encounter.      Social Care     No service coordination in this encounter.          Final Discharge Disposition Code: 03 - skilled nursing facility (SNF)

## 2018-05-18 NOTE — DISCHARGE PLACEMENT REQUEST
"Yasmine Lee  (58 y.o. Male)     From [place],   [phone]      Date of Birth Social Security Number Address Home Phone MRN    1960  [address]   [place] [address] [phone] [id number]    Gnosticism Marital Status          Non-Shinto        Admission Date Admission Type Admitting Provider Attending Provider Department, Room/Bed    18 Emergency Tavares Rodriguez MD West, Christopher R, MD [facility] 3E, S337/1    Discharge Date Discharge Disposition Discharge Destination         Psychiatric Hospital or Unit ([facility])              Attending Provider:  Tavares Rodriguez MD    Allergies:  No Known Allergies    Isolation:  None   Infection:  None   Code Status:  FULL    Ht:  162.6 cm (64.02\")   Wt:  84.4 kg (186 lb)    Admission Cmt:  None   Principal Problem:  Gangrene of left foot [I96]                 Active Insurance as of 2018     Primary Coverage     Payor Plan Insurance Group Employer/Plan Group    HUMANA MEDICARE REPLACEMENT HUMANA MEDICARE REPL [id number]     Payor Plan Address Payor Plan Phone Number Effective From Effective To    [address] [address] [phone] 2018     [facility] [id number]       Subscriber Name Subscriber Birth Date Member ID       YASMINE LEE 1960 [id number]                 Emergency Contacts      (Rel.) Home Phone Work Phone Mobile Phone    Deirdre Lee (Spouse) [phone] -- --    Meri Farias -- -- [phone]                 Discharge Summary      Casie M Mayne, PA-C at 2018  8:42 AM              [facility] Medicine Services  DISCHARGE SUMMARY    Patient Name: Yasmine Lee  : 1960  MRN: [id number]    Date of Admission: 2018  Date of Discharge:  18  Primary Care Physician: ALETA Plummer    Consults     Date and Time Order Name Status Description    2018 1026 Inpatient Cardiothoracic Surgery Consult      2018 1026 " Inpatient Infectious Diseases Consult Completed         Hospital Course     Presenting Problem:   Cellulitis of left foot [L03.116]  Cellulitis [L03.90]    Active Hospital Problems (** Indicates Principal Problem)    Diagnosis Date Noted   • **Gangrene of left foot [I96] 05/11/2018   • Hypertension [I10] 05/12/2018   • Cellulitis of left lower extremity [L03.116] 05/11/2018   • PAD (peripheral artery disease) [I73.9] 05/11/2018   • Diabetes mellitus, type 2 [E11.9] 05/11/2018   • Tobacco abuse [Z72.0] 05/11/2018   • Renal insufficiency [N28.9] 05/11/2018      Resolved Hospital Problems    Diagnosis Date Noted Date Resolved   No resolved problems to display.          Hospital Course:  Sandoval Hernandez is a 58 y.o. male with PMH of PAD, CAD, T2DM on OHA, tobacco abuse who presented [facility] 5/11/18 with c/o left foot pain, duskiness of midfoot/toes  And hx of prior 1st and 4th toe amputation on same foot. Recent stenting of his left common iliac artery and left superior femoral artery at [facility] 5/8/18. Transferred to [facility] for higher level of care.     Severe PAD with dry and wet gangrene of remainder of toes left foot/dorsum of foot. Dr Carreno, CTS was consulted performed left BKA 5/14/18. ID, Dr Coates evaluated recommending 72hr of doxycycline and zosyn which is completed. Blood cx negative x2.  PT OT working with pt, Dr Carreno feels pt would best benefit from structured inpt rehab, has been accepted to [facility] re[facility]. Ok for dc from CTS standpoint, f/u with Dr Carreno 2-3 weeks.Dressing changes instructions per Dr Carreno:  Daily stump dressing changed using Xeroform gauze over the incision or by 4 x 4 fluffs and then Curlex wrap to hold the 4 x 4's in place and a #8 Spandage tube net dressing to hold the Kerlix in place.  Do not use any Ace wrap!!    CKD II, Cr 1.2 upon admission, improved now to 0.8. Avoid nephrotoxins/NSAIDS.    T2DM 7.3%, well controlled inpt requiring minimal SSI, resume OHA, f/u with PCP  within 1 wk of dc to optimize management.    Tobacco cessation, nicotine replacement. Pt plans to stop smoking.     Pt is clinically improved and medically appropriate for dc to rehab. F/u as above with Dr Carreno and PCP.        Procedure(s):  AMPUTATION BELOW KNEE LEFT       Day of Discharge     HPI:   Sitting up in bed, appears comfortable. No complaints. Tolerating diet well, no difficulty with urination/constipation. Pain controlled for orals. Looking forward to rehab. Plans to stop smoking. No questions at this time.     Review of Systems  Gen- No fevers, chills  CV- No chest pain, palpitations  Resp- No cough, dyspnea  GI- No N/V/D, abd pain      Otherwise ROS is negative except as mentioned in the HPI.    Vital Signs:   Temp:  [98 °F (36.7 °C)-99 °F (37.2 °C)] 99 °F (37.2 °C)  Heart Rate:  [58-71] 68  Resp:  [18-19] 18  BP: (114-172)/(63-84) 150/63     Physical Exam:  Constitutional: No acute distress, awake, alert, sitting up in bed  Eyes: PERRLA, sclerae anicteric, no conjunctival injection  HENT: NCAT, mucous membranes moist  Neck: Supple,  trachea midline  Respiratory: Clear to auscultation bilaterally, nonlabored respirations   Cardiovascular: RRR, no murmurs, rubs, or gallops, palpable pedal pulse right foot.  Gastrointestinal: Positive bowel sounds, soft, nontender, nondistended  Musculoskeletal: No right ankle edema, left bka stump covered with kerlex dressing C/D/I.  Psychiatric: Appropriate affect, cooperative  Neurologic: Oriented x 3, Cranial Nerves grossly intact to confrontation, speech clear  Skin: No rashes    Pertinent  and/or Most Recent Results       Results from last 7 days  Lab Units 05/17/18  0514 05/15/18  0658 05/12/18  0536   WBC 10*3/mm3  --  7.92  --    HEMOGLOBIN g/dL 9.2* 9.5*  --    HEMATOCRIT % 29.4* 30.0*  --    PLATELETS 10*3/mm3  --  216  --    SODIUM mmol/L 141 139 141   POTASSIUM mmol/L 3.8 3.6 4.3   CHLORIDE mmol/L 109 108 107   CO2 mmol/L 24.0 24.0 25.0   BUN mg/dL 14 16  18   CREATININE mg/dL 0.80 0.80 0.80   GLUCOSE mg/dL 112* 100 118*   CALCIUM mg/dL 9.4 9.5 9.3           Invalid input(s): PROT, LABALBU        Invalid input(s): TG, LDLCALC, LDLREALC    Results from last 7 days  Lab Units 05/12/18  0536   HEMOGLOBIN A1C % 7.30*     Brief Urine Lab Results     None          Microbiology Results Abnormal     Procedure Component Value - Date/Time    Wound Culture - Wound, Foot [[id number]]  (Normal) Collected:  05/11/18 1111    Lab Status:  Final result Specimen:  Wound from Foot Updated:  05/13/18 1055     Wound Culture No growth at 2 days     Gram Stain Result No WBCs or organisms seen          Imaging Results (all)     Procedure Component Value Units Date/Time    XR Toe 2+ View Left [[id number]] Collected:  05/11/18 2033     Updated:  05/13/18 1432    Narrative:          EXAMINATION: XR TOE 2+ VW LEFT - 05/11/2018     INDICATION:  [id number], not elsewhere classified.     COMPARISON: None.     FINDINGS: Status post amputations at the level of the base of the  proximal phalanx first and fourth digits with cortical margins fairly  well-preserved. No evidence for definitive osseous erosion or soft  tissue gas. Remaining non amputated digits grossly unremarkable with  preserved joint spaces. No acute fracture.           Impression:       Post amputation changes first and fourth digits without  evidence for osseous erosion or lucency to suggest osteolytic change. No  soft tissue emphysema. Further imaging may be useful if symptoms persist  and/or progress to fully evaluate for underlying osteomyelitis.     DICTATED:     05/11/2018  EDITED/ls :     05/11/2018      This report was finalized on 5/13/2018 2:30 PM by Dr. Italo Philip.                              Discharge Details      Sandoval Hernandez   Home Medication Instructions AGUSTIN:[id number]    Printed on:05/18/18 0910   Medication Information                      acetaminophen (TYLENOL) 325 MG tablet  Take 2 tablets by mouth Every 6  (Six) Hours As Needed for Mild Pain .             allopurinol (ZYLOPRIM) 300 MG tablet  Take 300 mg by mouth Daily.             amLODIPine (NORVASC) 10 MG tablet  Take 10 mg by mouth Daily.             atorvastatin (LIPITOR) 80 MG tablet  Take 1 tablet by mouth Every Night.             carvedilol (COREG) 25 MG tablet  Take 25 mg by mouth 2 (Two) Times a Day With Meals.             CloNIDine (CATAPRES) 0.1 MG tablet  Take 0.1 mg by mouth 2 (Two) Times a Day.             fenofibrate (TRICOR) 145 MG tablet  Take 145 mg by mouth Daily.             lisinopril (PRINIVIL,ZESTRIL) 40 MG tablet  Take 40 mg by mouth Daily.             metFORMIN (GLUCOPHAGE) 1000 MG tablet  Take 1,000 mg by mouth 2 (Two) Times a Day With Meals.             nicotine (NICODERM CQ) 21 MG/24HR patch  Place 1 patch on the skin Daily.             nitroglycerin (NITROSTAT) 0.4 MG SL tablet  Place 0.4 mg under the tongue Every 5 (Five) Minutes As Needed for Chest Pain. Take no more than 3 doses in 15 minutes.             oxyCODONE-acetaminophen (PERCOCET) 5-325 MG per tablet  Take 1 tablet by mouth Every 4 (Four) Hours As Needed for Moderate Pain  for up to 3 days.             rOPINIRole (REQUIP) 1 MG tablet  Take 1 mg by mouth Every Night. Take 1 hour before bedtime.             sennosides-docusate sodium (SENOKOT-S) 8.6-50 MG tablet  Take 2 tablets by mouth 2 (Two) Times a Day As Needed for Constipation.                   Discharge Disposition:  Psychiatric Hospital or Unit ([facility])    Discharge Diet:  Diet Instructions     Diet: Cardiac, Consistent Carbohydrate       Discharge Diet:   Cardiac  Consistent Carbohydrate             No future appointments.    Additional Instructions for the Follow-ups that You Need to Schedule     Discharge Follow-up with PCP    As directed      Follow Up Details:  within 1week of discharge from rehab         Discharge Follow-up with Specialty: Dr Wai STATON    As directed      Specialty:  Dr Carreno CTS     Follow Up Details:  please schedule 2-3 week follow up               Time Spent on Discharge: 35 minutes    Electronically signed by Casie M Mayne, PA-C, 05/18/18, 8:44 AM.      Electronically signed by Casie M Mayne, PA-C at 5/18/2018  9:10 AM       Discharge Order     Start     Ordered    05/18/18 0909  Discharge patient  Once     Expected Discharge Date:  05/18/18    Discharge Disposition:  Psychiatric Hospital or Unit ([facility])    Physician of Record:  NILS PANIAGUA [[id number]]    Physician of Record selection review needed?:  No       Question Answer Comment   Physician of Record NILS PANIAGUA    Physician of Record selection review needed? No        05/18/18 0910

## 2018-06-21 ENCOUNTER — OFFICE VISIT (OUTPATIENT)
Dept: CARDIAC SURGERY | Facility: CLINIC | Age: 58
End: 2018-06-21

## 2018-06-21 VITALS
HEIGHT: 64 IN | BODY MASS INDEX: 28.68 KG/M2 | OXYGEN SATURATION: 99 % | WEIGHT: 168 LBS | SYSTOLIC BLOOD PRESSURE: 134 MMHG | DIASTOLIC BLOOD PRESSURE: 80 MMHG | TEMPERATURE: 98.7 F | HEART RATE: 84 BPM

## 2018-06-21 DIAGNOSIS — I96 GANGRENE OF FOOT (HCC): ICD-10-CM

## 2018-06-21 DIAGNOSIS — I73.9 PERIPHERAL VASCULAR DISEASE (HCC): Primary | ICD-10-CM

## 2018-06-21 PROCEDURE — 99024 POSTOP FOLLOW-UP VISIT: CPT | Performed by: THORACIC SURGERY (CARDIOTHORACIC VASCULAR SURGERY)

## 2018-06-21 NOTE — PROGRESS NOTES
06/21/2018  Patient Information  Sandoval Hernandez                                                                                          [address] [place] [address]   1960  'PCP/Referring Physician'  Radha Tan, APRN  [phone]  No ref. provider found    Chief Complaint   Patient presents with   • Post-op Follow-up     5/14/18 BKA   • Fatigue       History of Present Illness:Patient seen today in the office for his first postop visit following a left below-knee amputation done on 05/14/2018 for severe peripheral vascular disease with gangrene of the left foot.  The patient had no difficulty since going to rehabilitation and going home.      Patient Active Problem List   Diagnosis   • Cellulitis of left lower extremity   • Gangrene of left foot   • PAD (peripheral artery disease)   • Diabetes mellitus, type 2   • Tobacco abuse   • Renal insufficiency   • Hypertension     Past Medical History:   Diagnosis Date   • Diabetes mellitus    • Gout    • Hypertension      Past Surgical History:   Procedure Laterality Date   • AMPUTATION FOOT / TOE      amputation of toes to left foot   • BELOW KNEE AMPUTATION Left 5/14/2018    Procedure: AMPUTATION BELOW KNEE LEFT;  Surgeon: Danie Carreno MD;  Location: [facility];  Service: Vascular       Current Outpatient Prescriptions:   •  acetaminophen (TYLENOL) 325 MG tablet, Take 2 tablets by mouth Every 6 (Six) Hours As Needed for Mild Pain ., Disp: , Rfl:   •  allopurinol (ZYLOPRIM) 300 MG tablet, Take 300 mg by mouth Daily., Disp: , Rfl:   •  amLODIPine (NORVASC) 10 MG tablet, Take 10 mg by mouth Daily., Disp: , Rfl:   •  atorvastatin (LIPITOR) 80 MG tablet, Take 1 tablet by mouth Every Night., Disp: , Rfl:   •  carvedilol (COREG) 25 MG tablet, Take 25 mg by mouth 2 (Two) Times a Day With Meals., Disp: , Rfl:   •  CloNIDine (CATAPRES) 0.1 MG tablet, Take 0.1 mg by mouth 2 (Two) Times a Day., Disp: , Rfl:   •  fenofibrate (TRICOR) 145 MG tablet, Take 145 mg by mouth Daily.,  "Disp: , Rfl:   •  lisinopril (PRINIVIL,ZESTRIL) 40 MG tablet, Take 40 mg by mouth Daily., Disp: , Rfl:   •  metFORMIN (GLUCOPHAGE) 1000 MG tablet, Take 1,000 mg by mouth 2 (Two) Times a Day With Meals., Disp: , Rfl:   •  nicotine (NICODERM CQ) 21 MG/24HR patch, Place 1 patch on the skin Daily., Disp: , Rfl:   •  nitroglycerin (NITROSTAT) 0.4 MG SL tablet, Place 0.4 mg under the tongue Every 5 (Five) Minutes As Needed for Chest Pain. Take no more than 3 doses in 15 minutes., Disp: , Rfl:   •  rOPINIRole (REQUIP) 1 MG tablet, Take 1 mg by mouth Every Night. 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     Family History   Problem Relation Age of Onset   • Heart attack Father      ROS  Vitals:    06/21/18 1324   BP: 134/80   BP Location: Right arm   Patient Position: Sitting   Pulse: 84   Temp: 98.7 °F (37.1 °C)   SpO2: 99%   Weight: 76.2 kg (168 lb)   Height: 162.6 cm (64\")      Physical Exam on examination today the left below-knee amputation stump is healing and skin sutures are intact.  I did not remove any of the skin sutures and a new dressing was applied over the stump area    Labs/Imaging: No laboratory or imaging to review    Assessment/Plan: #1.  Postop left below-knee amputation performed on 05/14/2018 for " severe gangrene of the left foot with amputation stump healing.    Plan: I am going to leave this skin sutures in for another 3 weeks and we will plan to remove them at the return visit in 3 weeks.           Patient Active Problem List   Diagnosis   • Cellulitis of left lower extremity   • Gangrene of left foot   • PAD (peripheral artery disease)   • Diabetes mellitus, type 2   • Tobacco abuse   • Renal insufficiency   • Hypertension     Signed by:Danie Carreno M.D.

## 2018-07-12 ENCOUNTER — OFFICE VISIT (OUTPATIENT)
Dept: CARDIAC SURGERY | Facility: CLINIC | Age: 58
End: 2018-07-12

## 2018-07-12 VITALS
OXYGEN SATURATION: 93 % | TEMPERATURE: 99 F | SYSTOLIC BLOOD PRESSURE: 156 MMHG | BODY MASS INDEX: 29.53 KG/M2 | DIASTOLIC BLOOD PRESSURE: 91 MMHG | HEIGHT: 64 IN | WEIGHT: 173 LBS | HEART RATE: 97 BPM

## 2018-07-12 DIAGNOSIS — I73.9 PERIPHERAL VASCULAR DISEASE (HCC): Primary | ICD-10-CM

## 2018-07-12 DIAGNOSIS — I96 GANGRENE OF FOOT (HCC): ICD-10-CM

## 2018-07-12 PROCEDURE — 99024 POSTOP FOLLOW-UP VISIT: CPT | Performed by: THORACIC SURGERY (CARDIOTHORACIC VASCULAR SURGERY)

## 2018-07-13 NOTE — PROGRESS NOTES
"Patient Information  Sandoval Hernandez                                                                                           [address] [place] [address]      1960  [phone]      Chief Complaint   Patient presents with   • Post-op     Follow up s/p left BKA 5/14/2018 for peripheral vascular disease and gangrene.   • Peripheral Vascular Disease       History of Present Illness:Patient seen in the office today for second postop visit following a left below-knee amputation done on 2018 for severe peripheral vascular disease and gangrene of the left foot.  Patient last seen on 06/21/2018    Vitals:    07/12/18 1332   BP: 156/91   BP Location: Right arm   Patient Position: Sitting   Pulse: 97   Temp: 99 °F (37.2 °C)   SpO2: 93%   Weight: 78.5 kg (173 lb)   Height: 162.6 cm (64\")        Physical Exam examination today reveals a well healing left below-knee amputation stump.  All the skin sutures were removed.  New dressing was applied.    Lab/other results:    Assessment: #1.  Status post left below-knee amputation on 05/14/2018 for severe gangrene left foot now with a healed amputation stump   #2 diabetes mellitus with severe neuropathy #3.  Severe peripheral vascular disease  Plan: Plan to see the patient back in 2 months for follow-up visit and he is going to [facility] orth[facility]ics  for a stump  and subsequent below-knee prosthesis .  I did give the patient prescription for Narco 5/325 milligrams total of 20 tablets to take one every 4 hours 12 hours when necessary pain no refills  Danie Carreno M.D.   "

## 2018-10-12 ENCOUNTER — TELEPHONE (OUTPATIENT)
Dept: CARDIAC SURGERY | Facility: CLINIC | Age: 58
End: 2018-10-12

## 2018-11-13 ENCOUNTER — TELEPHONE (OUTPATIENT)
Dept: CARDIAC SURGERY | Facility: CLINIC | Age: 58
End: 2018-11-13

## 2018-11-13 NOTE — TELEPHONE ENCOUNTER
2ND ATTEMPT IN TRYING TO REACH PT FROM THE N/S 10/4/18 WITH [facility], PHONE KEEPS RINGING AND ALSO TRIED TO CALL PTS SPOUSE AND THAT PHONE NUMBER KEPT RINGING WILL MAKE PTS APPT

## 2019-01-02 ENCOUNTER — TELEPHONE (OUTPATIENT)
Dept: CARDIAC SURGERY | Facility: CLINIC | Age: 59
End: 2019-01-02

## 2019-01-02 NOTE — TELEPHONE ENCOUNTER
Tried to reach pt regarding n/s appt on 12/20/18 with [facility], could not leave message, will r/s appt for pt for a 3rd time for pt if pt no shows this appt will start the letter process     No showed on also on 10/4/18

## 2019-01-29 ENCOUNTER — TELEPHONE (OUTPATIENT)
Dept: CARDIAC SURGERY | Facility: CLINIC | Age: 59
End: 2019-01-29

## 2019-01-29 NOTE — TELEPHONE ENCOUNTER
Tried to call patient to reschedule the 1/24/19 NS appt.  Unable to reach patient at either number ([phone] or [phone]).  No answer and no voice message.  Will try again in a few weeks.

## 2019-09-18 ENCOUNTER — OFFICE VISIT (OUTPATIENT)
Dept: CARDIOLOGY | Facility: CLINIC | Age: 59
End: 2019-09-18

## 2019-09-18 VITALS
WEIGHT: 177.6 LBS | SYSTOLIC BLOOD PRESSURE: 132 MMHG | BODY MASS INDEX: 30.32 KG/M2 | DIASTOLIC BLOOD PRESSURE: 79 MMHG | HEIGHT: 64 IN | HEART RATE: 76 BPM | OXYGEN SATURATION: 95 %

## 2019-09-18 DIAGNOSIS — I73.9 PVD (PERIPHERAL VASCULAR DISEASE) WITH CLAUDICATION (HCC): Primary | ICD-10-CM

## 2019-09-18 DIAGNOSIS — N28.9 RENAL INSUFFICIENCY: ICD-10-CM

## 2019-09-18 DIAGNOSIS — I96 GANGRENE OF LEFT FOOT (HCC): ICD-10-CM

## 2019-09-18 DIAGNOSIS — I10 ESSENTIAL HYPERTENSION: ICD-10-CM

## 2019-09-18 DIAGNOSIS — E11.9 TYPE 2 DIABETES MELLITUS WITHOUT COMPLICATION, UNSPECIFIED WHETHER LONG TERM INSULIN USE (HCC): ICD-10-CM

## 2019-09-18 DIAGNOSIS — L03.116 CELLULITIS OF LEFT LOWER EXTREMITY: ICD-10-CM

## 2019-09-18 PROCEDURE — 99204 OFFICE O/P NEW MOD 45 MIN: CPT | Performed by: INTERNAL MEDICINE

## 2019-09-18 PROCEDURE — 99407 BEHAV CHNG SMOKING > 10 MIN: CPT | Performed by: INTERNAL MEDICINE

## 2019-09-18 RX ORDER — ASPIRIN 81 MG/1
81 TABLET, CHEWABLE ORAL DAILY
COMMUNITY

## 2019-09-18 RX ORDER — SODIUM PHOSPHATE,MONO-DIBASIC 19G-7G/118
1000 ENEMA (ML) RECTAL
COMMUNITY

## 2019-09-18 NOTE — PROGRESS NOTES
[facility] CARDIOLOGY  [address]  [place] [address]  Phone: [phone]  Fax: [phone]    09/18/2019    Chief Complaint   Patient presents with   • Peripheral Vascular Disease   • Hypertension        History:   Sandoval Hernandez is a 59 y.o. male seen in consultation, referred by ALETA Lucero  for PAD.  He has a history of gangrene, gout, hypertension and DM type 2. He also has a history of below the left knee amputations. He denies any chest pain, shortness of breath or palpitations.  He has complaints today of RLE pain. Described as burning with numbness from the knee down to toes. Foot has good color with capillary refill.     The chart and medications were reviewed today      Past Medical History:   Diagnosis Date   • Diabetes mellitus (CMS/HCC)    • Gangrene (CMS/HCC)    • Gout    • Hypertension    • Peripheral vascular disease (CMS/HCC)        Past Surgical History:   Procedure Laterality Date   • AMPUTATION FOOT / TOE      amputation of toes to left foot   • BELOW KNEE AMPUTATION Left 5/14/2018    Procedure: AMPUTATION BELOW KNEE LEFT;  Surgeon: Danie Carreno MD;  Location: [facility];  Service: Vascular        Review of Systems:  Please see HPI  Constitution: No chills, no rigors, no unexplained weight loss or weight gain  Eyes:  No diplopia, no blurred vision, no loss of vision, conjunctiva is pink and sclera is anicteric  ENT:  No tinnitus, no otorrhea, no epistaxis, no sore throat   Respiratory: No cough, no hemoptysis  Cardiovascular: see HPI  Gastrointestinal: No nausea, no vomiting, no hematemesis, no diarrhea or constipation, no melena  Genitourinary: No frequency of dysuria no hematuria  Integument: No pruritis and  no skin rash  Hematologic / Lymphatic: No excessive bleeding, easy bruising, fatigue, lymphadenopathy and petechiae  Musculoskeletal: No joint pain, joint stiffness, joint swelling, muscle pain, muscle weakness and neck  pain  Neurological: No dizziness, headaches, light headedness, seizures and vertigo  Endocrine: No frequent urination and nocturia, temperature intolerance, weight gain, unintended and weight loss, unintended        Past Social History:  Social History     Socioeconomic History   • Marital status:      Spouse name: Not on file   • Number of children: 2   • Years of education: Not on file   • Highest education level: Not on file   Occupational History   • Occupation: constuction-saw mill     Comment: disabled   Tobacco Use   • Smoking status: Current Every Day Smoker     Packs/day: 1.00     Years: 40.00     Pack years: 40.00   • Smokeless tobacco: Never Used   Substance and Sexual Activity   • Alcohol use: No   • Drug use: No   Social History Narrative    The patient is living with his mother in [place].       Past Family History:  Family History   Problem Relation Age of Onset   • Heart attack Father        Current Outpatient Medications   Medication Sig Dispense Refill   • aspirin 81 MG chewable tablet Chew 81 mg Daily.     • acetaminophen (TYLENOL) 325 MG tablet Take 2 tablets by mouth Every 6 (Six) Hours As Needed for Mild Pain .     • allopurinol (ZYLOPRIM) 300 MG tablet Take 300 mg by mouth Daily.     • amLODIPine (NORVASC) 10 MG tablet Take 10 mg by mouth Daily.     • atorvastatin (LIPITOR) 80 MG tablet Take 1 tablet by mouth Every Night.     • carvedilol (COREG) 25 MG tablet Take 25 mg by mouth 2 (Two) Times a Day With Meals.     • CloNIDine (CATAPRES) 0.1 MG tablet Take 0.1 mg by mouth 2 (Two) Times a Day.     • fenofibrate (TRICOR) 145 MG tablet Take 145 mg by mouth Daily.     • glucosamine sulfate 500 MG capsule capsule Take 1,000 mg by mouth.     • lisinopril (PRINIVIL,ZESTRIL) 40 MG tablet Take 40 mg by mouth Daily.     • metFORMIN (GLUCOPHAGE) 1000 MG tablet Take 1,000 mg by mouth 2 (Two) Times a Day With Meals.     • nicotine (NICODERM CQ) 21 MG/24HR patch Place 1 patch on the skin Daily.      • nitroglycerin (NITROSTAT) 0.4 MG SL tablet Place 0.4 mg under the tongue Every 5 (Five) Minutes As Needed for Chest Pain. Take no more than 3 doses in 15 minutes.     • rOPINIRole (REQUIP) 1 MG tablet Take 1 mg by mouth Every Night. Take 1 hour before bedtime.     • sennosides-docusate sodium (SENOKOT-S) 8.6-50 MG tablet Take 2 tablets by mouth 2 (Two) Times a Day As Needed for Constipation.       No current facility-administered medications for this visit.         No Known Allergies    Objective:  Vitals:    09/18/19 1453   BP: 132/79   Pulse: 76   SpO2: 95%         Comfortable NAD  PERRL, conjunctiva clear  Neck supple, no JVD or thyromegaly appreciated  S1/S2 RRR, no m/r/g  Lungs CTA B, normal effort  Abdomen S/NT/ND (+) BS, no HSM appreciated  Extremities warm, no clubbing, cyanosis, or edema  Normal gait  No visible or palpable skin lesions  A/Ox4, mood and affect appropriate  Pulse exam:   Feet are warm bilateral  1+ edema bilateral  Capillary refill is normal  No evidence of ulceration or color change of the toes  PULSES  Right DP and PT are 0+ and Left DP and PT are 2+    DATA:              Results for orders placed during the hospital encounter of 05/11/18   US Venous Doppler Lower Extremity Left (duplex)    Narrative US VENOUS DOPPLER LOWER EXTREMITY LEFT (DUPLEX)-     CLINICAL INDICATION: Left foot edema/erythema.          COMPARISON: None available.      TECHNIQUE: Color Doppler imaging was used with compression and  augmentation to evaluate the left lower extremity deep venous system.     FINDINGS:   There is patent spontaneous flow from the common femoral vein through  the posterior tibial veins.  There was no internal clot or area of noncompressibility in the left  lower extremity.  Normal augmentation was elicited where applicable.  The right common femoral vein is patent       Impression No DVT in the left lower extremity on today's exam.       This report was finalized on 5/11/2018 9:53 AM by  Dr. Gregory Pereira MD.          Procedures     Assessment:    RLE intermittent claudication  S/P left amputee below the knee using prosthesis to walk  Hx of failed intervention to the left LE leading to amputation.  Hypertension stable  Dyslipidemia statin therapy.  Tobacco use 1 ppd for 40 years    Plan:    I will proceed with a right peripheral angiogram.  It appears that he has no palpable pulses in the RLE but no tissue loss at this time.    I have explained the risks associated with the procedure to the patient including but not limited to an allergic reaction to the contrast material or medications used during the procedure bleeding, infection, and bruising at the catheter insertion site blood clots, which may trigger heart attack, stroke,   damage to the artery where the catheter was inserted, or damage to the arteries as the catheter travels through your body, irregular heart rhythm arrhythmias, kidney damage caused by the contrast material.     I extensively discussed consequences of smoking namely cardiovascular/metabolic, lung cancer, mouth cancer, pulmonary disorder including COPD and reduced quality of life.  At the end of the conversation, patient voices understanding of the risk involved in smoking.  Patient also understands the benefits of quitting.  I provided the patient smoking cessation material. Patient displayed understanding and stated that he will try to quit smoking.  During this visit I spent 12 minutes counseling the patient regarding the smoking cessation.          Patient's Body mass index is 30.48 kg/m². BMI is within normal parameters. No follow-up required..         ICD-10-CM ICD-9-CM   1. PVD (peripheral vascular disease) with claudication (CMS/[facility]) I73.9 443.9   2. Essential hypertension I10 401.9   3. Type 2 diabetes mellitus without complication, unspecified whether long term insulin use (CMS/[facility]) E11.9 250.00   4. Renal insufficiency N28.9 593.9   5. Cellulitis of left lower  extremity L03.116 682.6   6. Gangrene of left foot (CMS/[facility]) I96 785.4        Thank you for allowing me to participate in the care of Sandoval Hernandez. Feel free to contact me directly with any further questions or concerns.        Director, Cardiac Cath Lab      NAKUL Nuñez, acting as scribe for Natanael Venegas MD.   9/18/19  03:30 PM

## 2019-09-23 ENCOUNTER — RESULTS ENCOUNTER (OUTPATIENT)
Dept: CARDIOLOGY | Facility: CLINIC | Age: 59
End: 2019-09-23

## 2019-09-23 DIAGNOSIS — I73.9 PVD (PERIPHERAL VASCULAR DISEASE) WITH CLAUDICATION (HCC): ICD-10-CM

## 2019-09-23 DIAGNOSIS — I10 ESSENTIAL HYPERTENSION: ICD-10-CM

## 2019-09-23 DIAGNOSIS — E11.9 TYPE 2 DIABETES MELLITUS WITHOUT COMPLICATION, UNSPECIFIED WHETHER LONG TERM INSULIN USE (HCC): ICD-10-CM

## 2019-09-24 ENCOUNTER — RESULTS ENCOUNTER (OUTPATIENT)
Dept: CARDIOLOGY | Facility: CLINIC | Age: 59
End: 2019-09-24

## 2019-09-24 DIAGNOSIS — E11.9 TYPE 2 DIABETES MELLITUS WITHOUT COMPLICATION, UNSPECIFIED WHETHER LONG TERM INSULIN USE (HCC): ICD-10-CM

## 2019-09-24 DIAGNOSIS — I73.9 PVD (PERIPHERAL VASCULAR DISEASE) WITH CLAUDICATION (HCC): ICD-10-CM

## 2019-09-24 DIAGNOSIS — N28.9 RENAL INSUFFICIENCY: ICD-10-CM

## 2019-09-24 DIAGNOSIS — I10 ESSENTIAL HYPERTENSION: ICD-10-CM

## 2019-09-24 DIAGNOSIS — I96 GANGRENE OF LEFT FOOT (HCC): ICD-10-CM

## 2019-09-24 DIAGNOSIS — L03.116 CELLULITIS OF LEFT LOWER EXTREMITY: ICD-10-CM

## 2019-09-25 PROBLEM — I73.9 PVD (PERIPHERAL VASCULAR DISEASE) WITH CLAUDICATION (HCC): Status: ACTIVE | Noted: 2019-09-25

## 2019-09-25 PROBLEM — I10 ESSENTIAL HYPERTENSION: Status: ACTIVE | Noted: 2019-09-25

## 2019-09-25 PROBLEM — E11.9 TYPE 2 DIABETES MELLITUS WITHOUT COMPLICATION (HCC): Status: ACTIVE | Noted: 2019-09-25

## 2019-10-14 ENCOUNTER — HOSPITAL ENCOUNTER (OUTPATIENT)
Facility: HOSPITAL | Age: 59
Discharge: HOME | End: 2019-10-14
Attending: INTERNAL MEDICINE | Admitting: INTERNAL MEDICINE

## 2019-10-14 VITALS
SYSTOLIC BLOOD PRESSURE: 170 MMHG | HEART RATE: 80 BPM | TEMPERATURE: 97.7 F | DIASTOLIC BLOOD PRESSURE: 91 MMHG | HEIGHT: 64 IN | BODY MASS INDEX: 30.05 KG/M2 | RESPIRATION RATE: 16 BRPM | OXYGEN SATURATION: 98 % | WEIGHT: 176 LBS

## 2019-10-14 DIAGNOSIS — I10 ESSENTIAL HYPERTENSION: ICD-10-CM

## 2019-10-14 DIAGNOSIS — I73.9 PVD (PERIPHERAL VASCULAR DISEASE) WITH CLAUDICATION (HCC): ICD-10-CM

## 2019-10-14 DIAGNOSIS — L03.116 CELLULITIS OF LEFT LOWER EXTREMITY: ICD-10-CM

## 2019-10-14 DIAGNOSIS — N28.9 RENAL INSUFFICIENCY: ICD-10-CM

## 2019-10-14 DIAGNOSIS — E11.9 TYPE 2 DIABETES MELLITUS WITHOUT COMPLICATION, UNSPECIFIED WHETHER LONG TERM INSULIN USE (HCC): ICD-10-CM

## 2019-10-14 DIAGNOSIS — I96 GANGRENE OF LEFT FOOT (HCC): ICD-10-CM

## 2019-10-14 LAB
ALBUMIN SERPL-MCNC: 4.45 G/DL (ref 3.5–5.2)
ALBUMIN/GLOB SERPL: 1.5 G/DL
ALP SERPL-CCNC: 76 U/L (ref 39–117)
ALT SERPL W P-5'-P-CCNC: 23 U/L (ref 1–41)
ANION GAP SERPL CALCULATED.3IONS-SCNC: 13.6 MMOL/L (ref 5–15)
APTT PPP: 31.7 SECONDS (ref 23.8–36.1)
AST SERPL-CCNC: 19 U/L (ref 1–40)
BASOPHILS # BLD AUTO: 0.03 10*3/MM3 (ref 0–0.2)
BASOPHILS NFR BLD AUTO: 0.5 % (ref 0–1.5)
BILIRUB SERPL-MCNC: 0.2 MG/DL (ref 0.2–1.2)
BUN BLD-MCNC: 8 MG/DL (ref 6–20)
BUN/CREAT SERPL: 10.4 (ref 7–25)
CALCIUM SPEC-SCNC: 9.8 MG/DL (ref 8.6–10.5)
CHLORIDE SERPL-SCNC: 106 MMOL/L (ref 98–107)
CO2 SERPL-SCNC: 24.4 MMOL/L (ref 22–29)
CREAT BLD-MCNC: 0.77 MG/DL (ref 0.76–1.27)
DEPRECATED RDW RBC AUTO: 50.3 FL (ref 37–54)
EOSINOPHIL # BLD AUTO: 0.25 10*3/MM3 (ref 0–0.4)
EOSINOPHIL NFR BLD AUTO: 4 % (ref 0.3–6.2)
ERYTHROCYTE [DISTWIDTH] IN BLOOD BY AUTOMATED COUNT: 15.1 % (ref 12.3–15.4)
GFR SERPL CREATININE-BSD FRML MDRD: 103 ML/MIN/1.73
GLOBULIN UR ELPH-MCNC: 3 GM/DL
GLUCOSE BLD-MCNC: 126 MG/DL (ref 65–99)
HCT VFR BLD AUTO: 43.9 % (ref 37.5–51)
HGB BLD-MCNC: 14.3 G/DL (ref 13–17.7)
IMM GRANULOCYTES # BLD AUTO: 0.02 10*3/MM3 (ref 0–0.05)
IMM GRANULOCYTES NFR BLD AUTO: 0.3 % (ref 0–0.5)
INR PPP: 0.92 (ref 0.9–1.1)
LYMPHOCYTES # BLD AUTO: 2.13 10*3/MM3 (ref 0.7–3.1)
LYMPHOCYTES NFR BLD AUTO: 33.9 % (ref 19.6–45.3)
MCH RBC QN AUTO: 30.6 PG (ref 26.6–33)
MCHC RBC AUTO-ENTMCNC: 32.6 G/DL (ref 31.5–35.7)
MCV RBC AUTO: 93.8 FL (ref 79–97)
MONOCYTES # BLD AUTO: 0.5 10*3/MM3 (ref 0.1–0.9)
MONOCYTES NFR BLD AUTO: 7.9 % (ref 5–12)
NEUTROPHILS # BLD AUTO: 3.36 10*3/MM3 (ref 1.7–7)
NEUTROPHILS NFR BLD AUTO: 53.4 % (ref 42.7–76)
PLATELET # BLD AUTO: 110 10*3/MM3 (ref 140–450)
PMV BLD AUTO: 12.6 FL (ref 6–12)
POTASSIUM BLD-SCNC: 3.5 MMOL/L (ref 3.5–5.2)
PROT SERPL-MCNC: 7.4 G/DL (ref 6–8.5)
PROTHROMBIN TIME: 12.8 SECONDS (ref 11–15.4)
RBC # BLD AUTO: 4.68 10*6/MM3 (ref 4.14–5.8)
SODIUM BLD-SCNC: 144 MMOL/L (ref 136–145)
WBC NRBC COR # BLD: 6.29 10*3/MM3 (ref 3.4–10.8)

## 2019-10-14 PROCEDURE — 75625 CONTRAST EXAM ABDOMINL AORTA: CPT | Performed by: INTERNAL MEDICINE

## 2019-10-14 PROCEDURE — 25010000002 FENTANYL CITRATE (PF) 100 MCG/2ML SOLUTION: Performed by: INTERNAL MEDICINE

## 2019-10-14 PROCEDURE — 36415 COLL VENOUS BLD VENIPUNCTURE: CPT | Performed by: INTERNAL MEDICINE

## 2019-10-14 PROCEDURE — 85610 PROTHROMBIN TIME: CPT | Performed by: INTERNAL MEDICINE

## 2019-10-14 PROCEDURE — 80053 COMPREHEN METABOLIC PANEL: CPT | Performed by: INTERNAL MEDICINE

## 2019-10-14 PROCEDURE — 36245 INS CATH ABD/L-EXT ART 1ST: CPT | Performed by: INTERNAL MEDICINE

## 2019-10-14 PROCEDURE — 85025 COMPLETE CBC W/AUTO DIFF WBC: CPT | Performed by: INTERNAL MEDICINE

## 2019-10-14 PROCEDURE — 75716 ARTERY X-RAYS ARMS/LEGS: CPT | Performed by: INTERNAL MEDICINE

## 2019-10-14 PROCEDURE — 25010000002 IOPAMIDOL 61 % SOLUTION: Performed by: INTERNAL MEDICINE

## 2019-10-14 PROCEDURE — 25010000002 MIDAZOLAM PER 1 MG: Performed by: INTERNAL MEDICINE

## 2019-10-14 PROCEDURE — 85730 THROMBOPLASTIN TIME PARTIAL: CPT | Performed by: INTERNAL MEDICINE

## 2019-10-14 PROCEDURE — C1769 GUIDE WIRE: HCPCS | Performed by: INTERNAL MEDICINE

## 2019-10-14 PROCEDURE — C1894 INTRO/SHEATH, NON-LASER: HCPCS | Performed by: INTERNAL MEDICINE

## 2019-10-14 PROCEDURE — 99152 MOD SED SAME PHYS/QHP 5/>YRS: CPT | Performed by: INTERNAL MEDICINE

## 2019-10-14 PROCEDURE — C1760 CLOSURE DEV, VASC: HCPCS | Performed by: INTERNAL MEDICINE

## 2019-10-14 PROCEDURE — C1751 CATH, INF, PER/CENT/MIDLINE: HCPCS | Performed by: INTERNAL MEDICINE

## 2019-10-14 RX ORDER — ONDANSETRON 4 MG/1
4 TABLET, FILM COATED ORAL EVERY 6 HOURS PRN
Status: DISCONTINUED | OUTPATIENT
Start: 2019-10-14 | End: 2019-10-14 | Stop reason: HOSPADM

## 2019-10-14 RX ORDER — SODIUM CHLORIDE 9 MG/ML
INJECTION, SOLUTION INTRAVENOUS CONTINUOUS PRN
Status: COMPLETED | OUTPATIENT
Start: 2019-10-14 | End: 2019-10-14

## 2019-10-14 RX ORDER — ONDANSETRON 2 MG/ML
4 INJECTION INTRAMUSCULAR; INTRAVENOUS EVERY 6 HOURS PRN
Status: DISCONTINUED | OUTPATIENT
Start: 2019-10-14 | End: 2019-10-14 | Stop reason: HOSPADM

## 2019-10-14 RX ORDER — MIDAZOLAM HYDROCHLORIDE 1 MG/ML
INJECTION INTRAMUSCULAR; INTRAVENOUS AS NEEDED
Status: DISCONTINUED | OUTPATIENT
Start: 2019-10-14 | End: 2019-10-14 | Stop reason: HOSPADM

## 2019-10-14 RX ORDER — FENTANYL CITRATE 50 UG/ML
INJECTION, SOLUTION INTRAMUSCULAR; INTRAVENOUS AS NEEDED
Status: DISCONTINUED | OUTPATIENT
Start: 2019-10-14 | End: 2019-10-14 | Stop reason: HOSPADM

## 2019-10-14 RX ORDER — SODIUM CHLORIDE 9 MG/ML
100 INJECTION, SOLUTION INTRAVENOUS ONCE
Status: DISCONTINUED | OUTPATIENT
Start: 2019-10-14 | End: 2019-10-14 | Stop reason: HOSPADM

## 2019-10-14 RX ORDER — LIDOCAINE HYDROCHLORIDE 20 MG/ML
INJECTION, SOLUTION INFILTRATION; PERINEURAL AS NEEDED
Status: DISCONTINUED | OUTPATIENT
Start: 2019-10-14 | End: 2019-10-14 | Stop reason: HOSPADM

## 2019-10-14 RX ORDER — GABAPENTIN 800 MG/1
800 TABLET ORAL 3 TIMES DAILY
COMMUNITY

## 2019-11-11 NOTE — PROGRESS NOTES
[facility] CARDIOLOGY  [address]  [place] [address]  Phone: [phone]  Fax: [phone]    11/12/2019    Chief Complaint   Patient presents with   • Peripheral Vascular Disease        History:   Sandoval Hernandez is a 59 y.o. male seen in follow up, peripheral angiogram. He has a history of PVD, DM, and hypertension.  Peripheral angiogram was done on 10-14-19 in which we will continue medical management. He denies any chest pain or shortness of breath.  No palpitations or syncope. He still has complaints of burning pain and going numb.  It does better at time, however he does have gout. He still continues to smoke and denies to quit at this time. States that he is down to 3 cigarettes a day.  Groin looks well.     The chart and medications were reviewed today.     Past Medical History:   Diagnosis Date   • Diabetes mellitus (CMS/HCC)    • Gangrene (CMS/HCC)    • Gout    • Hypertension    • Peripheral vascular disease (CMS/HCC)        Past Surgical History:   Procedure Laterality Date   • AMPUTATION FOOT / TOE      amputation of toes to left foot   • BELOW KNEE AMPUTATION Left 5/14/2018    Procedure: AMPUTATION BELOW KNEE LEFT;  Surgeon: Danie Carreno MD;  Location: [facility] OR;  Service: Vascular   • CARDIAC CATHETERIZATION Right 10/14/2019    Procedure: Peripheral angiography;  Surgeon: Natanael Venegas MD;  Location: [facility] INVASIVE LOCATION;  Service: Cardiovascular        Past Social History:  Social History     Socioeconomic History   • Marital status:      Spouse name: Not on file   • Number of children: 2   • Years of education: Not on file   • Highest education level: Not on file   Occupational History   • Occupation: constuction-saw [employer]     Comment: disabled   Tobacco Use   • Smoking status: Current Every Day Smoker     Packs/day: 1.00     Years: 40.00     Pack years: 40.00   • Smokeless tobacco: Never Used   Substance and Sexual Activity   • Alcohol use: No    • Drug use: No   • Sexual activity: Defer   Social History Narrative    The patient is living with his mother in [place].       Past Family History:  Family History   Problem Relation Age of Onset   • Heart attack Father        Review of Systems:   Please see HPI  Constitution: No chills, no rigors, no unexplained weight loss or weight gain  Eyes:  No diplopia, no blurred vision, no loss of vision, conjunctiva is pink and sclera is anicteric  ENT:  No tinnitus, no otorrhea, no epistaxis, no sore throat   Respiratory: No cough, no hemoptysis  Cardiovascular: see HPI  Gastrointestinal: No nausea, no vomiting, no hematemesis, no diarrhea or constipation, no melena  Genitourinary: No frequency of dysuria no hematuria  Integument: No pruritis and  no skin rash  Hematologic / Lymphatic: No excessive bleeding, easy bruising, fatigue, lymphadenopathy and petechiae  Musculoskeletal: No joint pain, joint stiffness, joint swelling, muscle pain, muscle weakness and neck pain  Neurological: No dizziness, headaches, light headedness, seizures and vertigo  Endocrine: No frequent urination and nocturia, temperature intolerance, weight gain, unintended and weight loss, unintended      Current Outpatient Medications   Medication Sig Dispense Refill   • allopurinol (ZYLOPRIM) 300 MG tablet Take 300 mg by mouth Daily.     • amLODIPine (NORVASC) 10 MG tablet Take 10 mg by mouth Daily.     • apixaban (ELIQUIS) 5 MG tablet tablet Take 5 mg by mouth Daily.     • aspirin 81 MG chewable tablet Chew 81 mg Daily.     • atorvastatin (LIPITOR) 80 MG tablet Take 1 tablet by mouth Every Night.     • carvedilol (COREG) 25 MG tablet Take 25 mg by mouth 2 (Two) Times a Day With Meals.     • CloNIDine (CATAPRES) 0.1 MG tablet Take 0.1 mg by mouth 2 (Two) Times a Day.     • fenofibrate (TRICOR) 145 MG tablet Take 145 mg by mouth Daily.     • gabapentin (NEURONTIN) 800 MG tablet Take 800 mg by mouth 3 (Three) Times a Day.     • glucosamine sulfate  500 MG capsule capsule Take 1,000 mg by mouth.     • lisinopril (PRINIVIL,ZESTRIL) 40 MG tablet Take 40 mg by mouth Daily.     • metFORMIN (GLUCOPHAGE) 1000 MG tablet Take 1,000 mg by mouth 2 (Two) Times a Day With Meals.     • nitroglycerin (NITROSTAT) 0.4 MG SL tablet Place 0.4 mg under the tongue Every 5 (Five) Minutes As Needed for Chest Pain. Take no more than 3 doses in 15 minutes.     • rOPINIRole (REQUIP) 1 MG tablet Take 1 mg by mouth Every Night. Take 1 hour before bedtime.     • sennosides-docusate sodium (SENOKOT-S) 8.6-50 MG tablet Take 2 tablets by mouth 2 (Two) Times a Day As Needed for Constipation.       No current facility-administered medications for this visit.         No Known Allergies    Objective:  Vitals:    11/12/19 1405   BP: 159/76   Pulse: 67   SpO2: 97%     Comfortable NAD  PERRL, conjunctiva clear  Neck supple, no JVD or thyromegaly appreciated  S1/S2 RRR, no m/r/g  Lungs CTA B, normal effort  Abdomen S/NT/ND (+) BS, no HSM appreciated  Extremities warm, no clubbing, cyanosis, or edema  Normal gait  No visible or palpable skin lesions  A/Ox4, mood and affect appropriate  Pulse exam:    Feet are warm bilateral  1+ edema bilateral  Capillary refill is normal  No evidence of ulceration or color change of the toes  PULSES  Right DP and PT are 1+    DATA:            Results for orders placed during the hospital encounter of 10/14/19   Cardiac Catheterization/Vascular Study    Narrative PERIPHERAL ARTERIOGRAM / INTERVENTION REPORT     DATE OF PROCEDURE: 10/14/19     INDICATION FOR PROCEDURE: Severe claudication of the RIGHT leg.  Hx of ischemic gangrene of the left foot with failed intervention and   amputation at [facility] approx one year ago.  Today his Hgb was normal but PLT were noted to be 110,000        PROCEDURE PERFORMED:   LEFT femoral artery access with 5 French sheath  Aortogram with bilateral lower extremity runoff       PROCEDURE COMMENTS:    Sandoval Hernandez was brought to the cath lab  and placed on the cardiac   catherization table in the postabsortive state. The patient was prepped   and draped according to the cath lab protocol under strict aseptic and   sterile condition. Patient's right femoral artery sight was prepped and   draped. Under fluoroscopic guidance theright femoral artery was punctured   using a 21 gauge needle utilizing the modified Seldinger technique. 4   [place] sheath was introduced over a wire. After aspirating for blood it   was flushed with heparinized saline.    LEFT extremity imaging was performed via the sheath. A 4F RIM catheter was   then advanced to the abdominal aorta at the level of L1 and aortogram was   performed. Catheter was then pulled back at the bifurcation at L5 and   engaged in the contralateral SISI. RIGHT extremity imaging was then   performed. After the procedure was completed the sheath was removed in the   cath lab and hemostasis achieved via manual compression. No complications   occurred.     Findings:  Aorta:  Abdominal aorta shows only mild disease.  Bilateral renal arteries are patent    Right Lower Extremity:   Common  Iliac artery was normal  SFA shows no significant disease  Popliteal Artery was patent with no disease  Tibio-peroneal trunk patent with no disease  Anterior tibial artery was patent  Posterior tibial artery was 100% occluded   2 Vessel run off present    Left Lower Extremity:  Common iliac artery shows mild disease and calcific changes   SFA is patent with occlusion  At the proximal vessel  There is faint collateral network supplying the stump of Left BKA    Recommendations:  No intervention is required at this time  Treat with medical management and supportive care.    EBL: 50 ML  No specimens were removed.    Plan  Continue medical therapy  Thrombocytopenia work up will be undertaken.    Natanael Venegas MD  10/14/19        Director, Cardiac Cath Lab       Procedures       Assessment:  PVD s/p amputation left lower extremity below  the knee with recent arteriogram showing stable PAD on the right and adequate flow to the foot.  Essential HTN uncontrolled.  Tobacco abuse    Plan:  Continue medical management at this time.  Encouraged him to start walking more daily.  I will see him back in 4 months or sooner if needed.     I extensively discussed consequences of smoking namely cardiovascular/metabolic, lung cancer, mouth cancer, pulmonary disorder including COPD and reduced quality of life.  At the end of the conversation, patient voices understanding of the risk involved in smoking.  Patient also understands the benefits of quitting.  I provided the patient smoking cessation material. Patient displayed understanding and stated that he will try to quit smoking.  During this visit I spent 5-10 minutes counseling the patient regarding the smoking cessation.        Patient's Body mass index is 30.9 kg/m². BMI is above normal parameters. Recommendations include: nutrition counseling.         ICD-10-CM ICD-9-CM   1. PVD (peripheral vascular disease) with claudication (CMS/[facility]) I73.9 443.9   2. Essential hypertension I10 401.9   3. Type 2 diabetes mellitus without complication, without long-term current use of insulin (CMS/[facility]) E11.9 250.00        Thank you for allowing me to participate in the care of Sandoval Hernandez. Feel free to contact me directly with any further questions or concerns.        Director, Cardiac Cath Lab    NAKUL Nuñez, acting as scribe for Natanael Venegas MD, [facility], [facility].   11/12/19  4:07 PM     I have read and agree the documentation that has been completed regarding this visit.  By signing this record, I attest that the documentation was completed by my physical presence and is an accurate record of the encounter.  Voice recognition / transcription technology used for some documentation in this chart in attempt to mitigate substantial inefficiencies created by this electronic health record technology.  As a result, there may be  some typos and.or nonsensical language introduced into the chart that either overlooked in editing/review and/or that I am unable to correct as patient care needs require me to prioritize my attention to bedside patient care rather than electronic documentation. MA

## 2019-11-12 ENCOUNTER — OFFICE VISIT (OUTPATIENT)
Dept: CARDIOLOGY | Facility: CLINIC | Age: 59
End: 2019-11-12

## 2019-11-12 VITALS
WEIGHT: 180 LBS | OXYGEN SATURATION: 97 % | HEIGHT: 64 IN | DIASTOLIC BLOOD PRESSURE: 76 MMHG | SYSTOLIC BLOOD PRESSURE: 159 MMHG | BODY MASS INDEX: 30.73 KG/M2 | HEART RATE: 67 BPM

## 2019-11-12 DIAGNOSIS — I73.9 PVD (PERIPHERAL VASCULAR DISEASE) WITH CLAUDICATION (HCC): Primary | ICD-10-CM

## 2019-11-12 DIAGNOSIS — E11.9 TYPE 2 DIABETES MELLITUS WITHOUT COMPLICATION, WITHOUT LONG-TERM CURRENT USE OF INSULIN (HCC): ICD-10-CM

## 2019-11-12 DIAGNOSIS — I10 ESSENTIAL HYPERTENSION: ICD-10-CM

## 2019-11-12 PROCEDURE — 99213 OFFICE O/P EST LOW 20 MIN: CPT | Performed by: INTERNAL MEDICINE

## 2019-11-12 PROCEDURE — 99406 BEHAV CHNG SMOKING 3-10 MIN: CPT | Performed by: INTERNAL MEDICINE

## 2020-03-09 ENCOUNTER — OFFICE VISIT (OUTPATIENT)
Dept: CARDIOLOGY | Facility: CLINIC | Age: 60
End: 2020-03-09

## 2020-03-09 VITALS
OXYGEN SATURATION: 97 % | SYSTOLIC BLOOD PRESSURE: 157 MMHG | WEIGHT: 178.6 LBS | HEIGHT: 64 IN | DIASTOLIC BLOOD PRESSURE: 79 MMHG | BODY MASS INDEX: 30.49 KG/M2 | HEART RATE: 76 BPM

## 2020-03-09 DIAGNOSIS — Z89.512 HX OF BKA, LEFT (HCC): ICD-10-CM

## 2020-03-09 DIAGNOSIS — I73.9 PAD (PERIPHERAL ARTERY DISEASE) (HCC): Primary | ICD-10-CM

## 2020-03-09 PROCEDURE — 99213 OFFICE O/P EST LOW 20 MIN: CPT | Performed by: INTERNAL MEDICINE

## (undated) DEVICE — DRAPE,U/ SHT,SPLIT,PLAS,STERIL: Brand: MEDLINE

## (undated) DEVICE — AIRWY 90MM NO9

## (undated) DEVICE — CANNULA,OXY,ADULT,SUPER SOFT,W/14'TUB,UC: Brand: MEDLINE INDUSTRIES, INC.

## (undated) DEVICE — SUT SILK 2/0 TIES 18IN A185H

## (undated) DEVICE — HANDPIECE SET WITH HIGH FLOW TIP AND SUCTION TUBE: Brand: INTERPULSE

## (undated) DEVICE — ANTIBACTERIAL UNDYED BRAIDED (POLYGLACTIN 910), SYNTHETIC ABSORBABLE SUTURE: Brand: COATED VICRYL

## (undated) DEVICE — 3M™ STERI-DRAPE™ ISOLATION BAG, 10 PER CARTON / 4 CARTONS PER CASE, 1003: Brand: 3M™ STERI-DRAPE™

## (undated) DEVICE — SUT GUT CHRM 0 STD TIES 54IN S114H

## (undated) DEVICE — DRSNG SURESITE WNDW 4X4.5

## (undated) DEVICE — DRAPE,REIN 53X77,STERILE: Brand: MEDLINE

## (undated) DEVICE — MEDI-VAC YANKAUER SUCTION HANDLE W/BULBOUS TIP: Brand: CARDINAL HEALTH

## (undated) DEVICE — SUT SILK 2/0 SH CR8 18IN CR8 C012D

## (undated) DEVICE — FLTR HME STR UNIV W/SMPL PORT

## (undated) DEVICE — SUT ETHLN 2/0 PS 18IN 585H

## (undated) DEVICE — GAUZE FLUFF 1 PLY: Brand: DEROYAL

## (undated) DEVICE — MYNXGRIP 5F: Brand: MYNXGRIP

## (undated) DEVICE — PCH DRN BRST RECONSTRUCT BRA LXF

## (undated) DEVICE — LN INJ CONTRST FLXCIL HP F/M LL 1200PSI10

## (undated) DEVICE — ST NERV BLCK CONT CONTIPLEX ECHO CLSD 18G 4IN

## (undated) DEVICE — JACKSON-PRATT 100CC BULB RESERVOIR: Brand: CARDINAL HEALTH

## (undated) DEVICE — SUT VIC 2/0 CT1 CR8 18IN J839D

## (undated) DEVICE — BANDAGE,GAUZE,BULKEE II,4.5"X4.1YD,STRL: Brand: MEDLINE

## (undated) DEVICE — DRAIN JACKSON PRATT ROUND 15FR: Brand: CARDINAL HEALTH

## (undated) DEVICE — SHEATH INTRO SUPERSHEATH JWIRE .035 5F 11CM

## (undated) DEVICE — MEDI-VAC NON-CONDUCTIVE SUCTION TUBING: Brand: CARDINAL HEALTH

## (undated) DEVICE — ENCORE® LATEX MICRO SIZE 7, STERILE LATEX POWDER-FREE SURGICAL GLOVE: Brand: ENCORE

## (undated) DEVICE — GW INQWIRE FC PTFE J/3MM .035 180

## (undated) DEVICE — POOLE SUCTION INSTRUMENT WITH REMOVABLE SHEATH: Brand: POOLE

## (undated) DEVICE — ADULT DISPOSABLE SINGLE-PATIENT USE PULSE OXIMETER SENSOR: Brand: NONIN

## (undated) DEVICE — CANNULA,OXY,ADULT,SUPERSOFT,W/7'TUB,UC: Brand: MEDLINE

## (undated) DEVICE — Device: Brand: MEDEX

## (undated) DEVICE — ST INF PRI SMRTSTE 20DRP 2VLV 24ML 117

## (undated) DEVICE — DEV INFL MONARCH 25W

## (undated) DEVICE — SUT ETHLN 4/0 PC3 18IN 1864G

## (undated) DEVICE — STCKNT IMPERV 12IN STRL

## (undated) DEVICE — PK MINOR SPLT 10

## (undated) DEVICE — SOL LR 1000ML

## (undated) DEVICE — NERVE BLOCK SUPPORT KIT/BLUE: Brand: MEDLINE INDUSTRIES, INC.

## (undated) DEVICE — CATH SFT VU RIM BR 5F65CM 0.35

## (undated) DEVICE — STRYKER PERFORMANCE SERIES SAGITTAL BLADE: Brand: STRYKER PERFORMANCE SERIES

## (undated) DEVICE — GUIDEWIRE BOWL W/LID: Brand: MEDLINE INDUSTRIES, INC.

## (undated) DEVICE — ST EXT IV SMARTSITE 2VLV SP M LL 5ML IV1

## (undated) DEVICE — SHEET,DRAPE,40X58,STERILE: Brand: MEDLINE

## (undated) DEVICE — 3M™ WARMING BLANKET, UPPER BODY, 10 PER CASE, 42268: Brand: BAIR HUGGER™

## (undated) DEVICE — PK CATH CARD 70

## (undated) DEVICE — KT MINI ACC 5F .18X40CM SS 21G 7CM

## (undated) DEVICE — SUT SILK 3/0 TIES 18IN A184H

## (undated) DEVICE — DRSNG WND GZ CURAD OIL EMULSION 3X8IN STRL PK/3